# Patient Record
Sex: FEMALE | Race: WHITE | NOT HISPANIC OR LATINO | Employment: UNEMPLOYED | URBAN - NONMETROPOLITAN AREA
[De-identification: names, ages, dates, MRNs, and addresses within clinical notes are randomized per-mention and may not be internally consistent; named-entity substitution may affect disease eponyms.]

---
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
## 2022-01-05 PROCEDURE — U0004 COV-19 TEST NON-CDC HGH THRU: HCPCS | Performed by: NURSE PRACTITIONER

## 2022-01-09 ENCOUNTER — HOSPITAL ENCOUNTER (EMERGENCY)
Facility: HOSPITAL | Age: 22
Discharge: HOME | End: 2022-01-09
Attending: EMERGENCY MEDICINE | Admitting: EMERGENCY MEDICINE

## 2022-01-09 VITALS
OXYGEN SATURATION: 96 % | RESPIRATION RATE: 18 BRPM | WEIGHT: 190 LBS | HEIGHT: 63 IN | TEMPERATURE: 98.5 F | HEART RATE: 79 BPM | BODY MASS INDEX: 33.66 KG/M2 | SYSTOLIC BLOOD PRESSURE: 127 MMHG | DIASTOLIC BLOOD PRESSURE: 87 MMHG

## 2022-01-09 DIAGNOSIS — J00 ACUTE NASOPHARYNGITIS (COMMON COLD): Primary | ICD-10-CM

## 2022-01-09 LAB
B-HCG UR QL: NEGATIVE
SARS-COV-2 RNA PNL SPEC NAA+PROBE: NOT DETECTED

## 2022-01-09 PROCEDURE — 99283 EMERGENCY DEPT VISIT LOW MDM: CPT

## 2022-01-09 PROCEDURE — C9803 HOPD COVID-19 SPEC COLLECT: HCPCS

## 2022-01-09 PROCEDURE — 87635 SARS-COV-2 COVID-19 AMP PRB: CPT | Performed by: PHYSICIAN ASSISTANT

## 2022-01-09 PROCEDURE — 81025 URINE PREGNANCY TEST: CPT | Performed by: PHYSICIAN ASSISTANT

## 2022-01-09 NOTE — ED NOTES
Pt received discharge instructions and verbalized understanding; Breathing even and non labored with no signs of distress; AOx4; GCS 15; Pt ambulated off unit with steady gait with sister and friend.      Jeana Polo RN  01/09/22 3745

## 2022-01-09 NOTE — ED PROVIDER NOTES
"Subjective   History of Present Illness  Chief Complaint: Body aches, chills, nausea  History of Present Illness: 21-year-old female presents wanting to be tested for COVID-19, says she has had 2 days worth of body aches, chills, nausea.  Denies any vomiting, no abdominal pain, no cough, no shortness of breath, no chest pain.  She was tested on January 5 for COVID-19 that was negative, says she is had no symptoms since then and no exposure since then, she was discharged with steroid pack, and azithromycin which she is currently taking.  Onset: 2 days  Duration: Continuous  Exacerbating / Alleviating factors: None  Associated symptoms: None    Nurses Notes reviewed and agree, including vitals, allergies, social history and prior medical history.    REVIEW OF SYSTEMS: All systems reviewed and not pertinent unless noted.    Positive for: Body aches, chills, nausea    Negative for: All other review systems negative unless specified  Review of Systems    Past Medical History:   Diagnosis Date   • Anxiety    • Anxiety    • Depression    • Major depression    • PTSD (post-traumatic stress disorder)    • Self-injurious behavior     hx of cutting starting at age 15   • Suicide attempt ([facility])     3/25/19-overdose attempt, 2017 overdose attempt       Allergies   Allergen Reactions   • Iodine Hives   • Latex Hives       Past Surgical History:   Procedure Laterality Date   • ESOPHAGEAL DILATATION     • TONSILLECTOMY         Family History   Problem Relation Age of Onset   • Rheum arthritis Mother    • Depression Mother    • Drug abuse Mother    • Heart murmur Father    • Drug abuse Father    • Alcohol abuse Father        Social History     Socioeconomic History   • Marital status: Single   Tobacco Use   • Smoking status: Current Every Day Smoker     Types: Electronic Cigarette   • Smokeless tobacco: Never Used   Vaping Use   • Vaping Use: Every day   Substance and Sexual Activity   • Alcohol use: No     Comment: \"I barely ever " "drink alcohol.\"   • Drug use: Yes     Types: Marijuana   • Sexual activity: Yes     Partners: Male     Birth control/protection: Implant           Objective   Physical Exam  CONSTITUTIONAL: Well developed, well nourished female,  in no acute distress.  VITAL SIGNS: per nursing, reviewed and noted  SKIN: exposed skin with no rashes, ulcerations or petechiae.  EYES: perrla. EOMI.  ENT: Normal voice.  Patient maintained wearing a mask throughout patient encounter due to coronavirus pandemic  RESPIRATORY:  No increased work of breathing. No retractions.  CARDIOVASCULAR:  regular rate and rhythm, no murmurs.  Good Peripheral pulses. Good cap refill to extremities.  GI: Abdomen soft, nontender, normal bowel sounds. No hernia. No ascites.  NEUROLOGIC: Alert, oriented x 3. No gross deficits. GCS 15.  PSYCH: appropriate affect.    Procedures           ED Course  ED Course as of 01/09/22 1343   Sun Jan 09, 2022   1238 BP: 127/87 [CC]   1238 Temp: 98.5 °F (36.9 °C) [CC]   1238 Heart Rate: 79 [CC]   1238 Resp: 18 [CC]   1238 SpO2: 96 % [CC]   1313 HCG, Urine QL: Negative [CC]   1341 COVID19: Not Detected [CC]      ED Course User Index  [CC] Crescencio Osorio PA                                                 [facility]  Number of Diagnoses or Management Options  Diagnosis management comments: Patient arrived in stable condition with vital signs all within normal limits, requesting Covid test, Covid test was negative and pregnancy test also negative.  Inform likely viral upper respiratory tract infection, only symptomatic treatment symptoms may persist for 4 to 5 days then should start seeing improvement.  They were to follow-up with primary care for resolution of symptoms or return if any symptoms worsen or new symptoms arise.       Amount and/or Complexity of Data Reviewed  Clinical lab tests: reviewed    Risk of Complications, Morbidity, and/or Mortality  Presenting problems: low  Diagnostic procedures: minimal  Management options: " low        Final diagnoses:   Acute nasopharyngitis (common cold)       ED Disposition  ED Disposition     ED Disposition Condition Comment    Discharge Stable           Hafsa Woo, APRN  [address]  [facility] [address]  [phone]    Go to   As needed, If symptoms worsen    [facility] Emergency Department  [address] [facility] [address] [phone]  Go to   As needed, If symptoms worsen         Medication List      No changes were made to your prescriptions during this visit.          Crescencio Osorio, PA  01/09/22 1348

## 2022-02-10 ENCOUNTER — OFFICE VISIT (OUTPATIENT)
Dept: INTERNAL MEDICINE | Facility: CLINIC | Age: 22
End: 2022-02-10

## 2022-02-10 VITALS
SYSTOLIC BLOOD PRESSURE: 125 MMHG | TEMPERATURE: 97.2 F | OXYGEN SATURATION: 100 % | DIASTOLIC BLOOD PRESSURE: 79 MMHG | BODY MASS INDEX: 32.96 KG/M2 | WEIGHT: 186 LBS | RESPIRATION RATE: 16 BRPM | HEART RATE: 92 BPM | HEIGHT: 63 IN

## 2022-02-10 DIAGNOSIS — Z30.011 ORAL CONTRACEPTION INITIAL PRESCRIPTION: Primary | ICD-10-CM

## 2022-02-10 DIAGNOSIS — Z11.3 SCREEN FOR STD (SEXUALLY TRANSMITTED DISEASE): ICD-10-CM

## 2022-02-10 LAB
B-HCG UR QL: NEGATIVE
BILIRUB BLD-MCNC: NEGATIVE MG/DL
CLARITY, POC: ABNORMAL
COLOR UR: YELLOW
EXPIRATION DATE: ABNORMAL
EXPIRATION DATE: NORMAL
GLUCOSE UR STRIP-MCNC: NEGATIVE MG/DL
INTERNAL NEGATIVE CONTROL: NEGATIVE
INTERNAL POSITIVE CONTROL: POSITIVE
KETONES UR QL: ABNORMAL
LEUKOCYTE EST, POC: NEGATIVE
Lab: ABNORMAL
Lab: NORMAL
NITRITE UR-MCNC: NEGATIVE MG/ML
PH UR: 6 [PH] (ref 5–8)
PROT UR STRIP-MCNC: ABNORMAL MG/DL
RBC # UR STRIP: NEGATIVE /UL
SP GR UR: 1.03 (ref 1–1.03)
UROBILINOGEN UR QL: NORMAL

## 2022-02-10 PROCEDURE — 81025 URINE PREGNANCY TEST: CPT | Performed by: NURSE PRACTITIONER

## 2022-02-10 PROCEDURE — 99213 OFFICE O/P EST LOW 20 MIN: CPT | Performed by: NURSE PRACTITIONER

## 2022-02-10 NOTE — PROGRESS NOTES
"  Chief Complaint / Reason:      Chief Complaint   Patient presents with   • Exposure to STD     do I have BV? . stomachache       Subjective     HPI  Patient presents today for STD testing and states that she is concerned that she could have an STD or bacterial vaginosis as she does have a stomachache she denies fever chills.  Denies sex being uncomfortable or any bleeding with it also she would like to discuss birth control.  History taken from: patient    PMH/FH/Social History were reviewed and updated appropriately in the electronic medical record.   Past Medical History:   Diagnosis Date   • Anxiety    • Anxiety    • Depression    • Major depression    • PTSD (post-traumatic stress disorder)    • Self-injurious behavior     hx of cutting starting at age 15   • Suicide attempt ([facility])     3/25/19-overdose attempt, 2017 overdose attempt     Past Surgical History:   Procedure Laterality Date   • ESOPHAGEAL DILATATION     • TONSILLECTOMY       Social History     Socioeconomic History   • Marital status: Single   Tobacco Use   • Smoking status: Current Every Day Smoker     Types: Electronic Cigarette   • Smokeless tobacco: Never Used   Vaping Use   • Vaping Use: Every day   Substance and Sexual Activity   • Alcohol use: No     Comment: \"I barely ever drink alcohol.\"   • Drug use: Yes     Types: Marijuana   • Sexual activity: Yes     Partners: Male     Birth control/protection: Implant     Family History   Problem Relation Age of Onset   • Rheum arthritis Mother    • Depression Mother    • Drug abuse Mother    • Heart murmur Father    • Drug abuse Father    • Alcohol abuse Father        Review of Systems:   Review of Systems   Constitutional: Positive for fatigue.   HENT: Negative.    Respiratory: Negative.    Cardiovascular: Negative.    Gastrointestinal: Positive for abdominal pain and nausea.   Genitourinary: Positive for frequency.   Allergic/Immunologic: Positive for environmental allergies.   Neurological: " Negative.    Psychiatric/Behavioral: Negative.          All other systems were reviewed and are negative.  Exceptions are noted in the subjective or above.      Objective     Vital Signs  Vitals:    02/10/22 1138   BP: 125/79   Pulse: 92   Resp: 16   Temp: 97.2 °F (36.2 °C)   SpO2: 100%       Body mass index is 32.95 kg/m².    Physical Exam  Vitals and nursing note reviewed.   Constitutional:       General: She is not in acute distress.     Appearance: She is well-developed.   Cardiovascular:      Rate and Rhythm: Normal rate and regular rhythm.      Pulses: Normal pulses.      Heart sounds: Normal heart sounds.   Pulmonary:      Effort: Pulmonary effort is normal.      Breath sounds: Normal breath sounds. No wheezing.   Chest:      Chest wall: No tenderness.   Skin:     General: Skin is warm and dry.      Capillary Refill: Capillary refill takes less than 2 seconds.      Coloration: Skin is not pale.      Findings: No erythema or rash.   Neurological:      Mental Status: She is alert and oriented to person, place, and time.   Psychiatric:         Behavior: Behavior normal.         Thought Content: Thought content normal.         Judgment: Judgment normal.              Results Review:    I reviewed the patient's new clinical results.   Brief Urine Lab Results  (Last result in the past 365 days)      Color   Clarity   Blood   Leuk Est   Nitrite   Protein   CREAT   Urine HCG        02/10/22 1147               Negative               Medication Review:     Current Outpatient Medications:   •  norgestimate-ethinyl estradiol (Ortho Tri-Cyclen Lo) 0.18/0.215/0.25 MG-25 MCG per tablet, Take 1 tablet by mouth Daily., Disp: 28 tablet, Rfl: 12    Diagnoses and all orders for this visit:    Oral contraception initial prescription  -     POCT pregnancy, urine    Screen for STD (sexually transmitted disease)  -     NuSwab VG+ & HSV  -     POCT urinalysis dipstick, automated  -     Genital Mycoplasmas SON, Swab - Swab, Vagina  -      Nuab VG+ & HSV    Discussed STD and pregnancy prevention      Return if symptoms worsen or fail to improve, for Annual.    Hafsa Woo, APRN  02/10/2022

## 2022-02-15 LAB
A VAGINAE DNA VAG QL NAA+PROBE: ABNORMAL SCORE
BVAB2 DNA VAG QL NAA+PROBE: ABNORMAL SCORE
C ALBICANS DNA VAG QL NAA+PROBE: NEGATIVE
C GLABRATA DNA VAG QL NAA+PROBE: NEGATIVE
C TRACH DNA VAG QL NAA+PROBE: NEGATIVE
HSV1 DNA SPEC QL NAA+PROBE: NEGATIVE
HSV2 DNA SPEC QL NAA+PROBE: NEGATIVE
M GENITALIUM DNA SPEC QL NAA+PROBE: POSITIVE
M HOMINIS DNA SPEC QL NAA+PROBE: NEGATIVE
MEGA1 DNA VAG QL NAA+PROBE: ABNORMAL SCORE
N GONORRHOEA DNA VAG QL NAA+PROBE: NEGATIVE
T VAGINALIS DNA VAG QL NAA+PROBE: NEGATIVE
UREAPLASMA DNA SPEC QL NAA+PROBE: POSITIVE

## 2022-02-16 RX ORDER — CLINDAMYCIN PHOSPHATE 100 MG/5G
1 CREAM VAGINAL DAILY
Qty: 25 G | Refills: 0 | Status: SHIPPED | OUTPATIENT
Start: 2022-02-16 | End: 2022-02-21

## 2022-02-16 RX ORDER — DOXYCYCLINE 100 MG/1
100 TABLET ORAL 2 TIMES DAILY
Qty: 28 TABLET | Refills: 0 | Status: SHIPPED | OUTPATIENT
Start: 2022-02-16 | End: 2022-03-02

## 2022-02-17 ENCOUNTER — TELEPHONE (OUTPATIENT)
Dept: INTERNAL MEDICINE | Facility: CLINIC | Age: 22
End: 2022-02-17

## 2022-02-17 NOTE — TELEPHONE ENCOUNTER
Allyssa talked with pharmacy about this. I also noted Simitys mom (nickolas) had accessed the chart and seen messages and results.

## 2022-02-17 NOTE — PROGRESS NOTES
Please contact patient and let her know that I did send in 2 prescriptions for BV and that it will be tomorrow before they will get the medicine and but she needs to pick it up at the pharmacy.

## 2022-02-17 NOTE — TELEPHONE ENCOUNTER
I attempted to contact them to discuss medication options but they are close to [address] I will try again later if not please contact them for me so I can give them verbal orders over the phone so they can substitute it for a different gel or we can do Flagyl.

## 2022-02-17 NOTE — TELEPHONE ENCOUNTER
Pharmacy Name: [id number] - [place] - [address] AT [facility] [address] - [phone]  - [phone]      Pharmacy representative name: FELICITY    Pharmacy representative phone number: [phone]    What medication are you calling in regards to:Clindamycin Phosphate, 1 Dose, (Clindesse) 2 % vaginal cream, Apply 1 application topically to the appropriate area as directed Daily for 5 days    What question does the pharmacy have: THIS IS A LONG ACTING CREAM AND ITS ONLY GIVEN WITH ONE DOES     Who is the provider that prescribed the medication: GWEN RIGGINS

## 2022-03-02 ENCOUNTER — HOSPITAL ENCOUNTER (EMERGENCY)
Facility: HOSPITAL | Age: 22
Discharge: HOME | End: 2022-03-02
Attending: EMERGENCY MEDICINE | Admitting: FAMILY MEDICINE

## 2022-03-02 VITALS
HEIGHT: 63 IN | RESPIRATION RATE: 16 BRPM | WEIGHT: 188.2 LBS | DIASTOLIC BLOOD PRESSURE: 76 MMHG | BODY MASS INDEX: 33.35 KG/M2 | TEMPERATURE: 98.5 F | SYSTOLIC BLOOD PRESSURE: 113 MMHG | HEART RATE: 80 BPM | OXYGEN SATURATION: 98 %

## 2022-03-02 DIAGNOSIS — K52.9 GASTROENTERITIS: Primary | ICD-10-CM

## 2022-03-02 LAB
B-HCG UR QL: NEGATIVE
BACTERIA UR QL AUTO: ABNORMAL /HPF
BILIRUB UR QL STRIP: NEGATIVE
CLARITY UR: CLEAR
COLOR UR: ABNORMAL
FLUAV RNA RESP QL NAA+PROBE: NOT DETECTED
FLUBV RNA RESP QL NAA+PROBE: NOT DETECTED
GLUCOSE UR STRIP-MCNC: NEGATIVE MG/DL
HGB UR QL STRIP.AUTO: NEGATIVE
HYALINE CASTS UR QL AUTO: ABNORMAL /LPF
KETONES UR QL STRIP: ABNORMAL
LEUKOCYTE ESTERASE UR QL STRIP.AUTO: NEGATIVE
MUCOUS THREADS URNS QL MICRO: ABNORMAL /HPF
NITRITE UR QL STRIP: NEGATIVE
PH UR STRIP.AUTO: 6 [PH] (ref 5–8)
PROT UR QL STRIP: ABNORMAL
RBC # UR STRIP: ABNORMAL /HPF
REF LAB TEST METHOD: ABNORMAL
SARS-COV-2 RNA RESP QL NAA+PROBE: NOT DETECTED
SP GR UR STRIP: 1.03 (ref 1–1.03)
SQUAMOUS #/AREA URNS HPF: ABNORMAL /HPF
UROBILINOGEN UR QL STRIP: ABNORMAL
WBC # UR STRIP: ABNORMAL /HPF

## 2022-03-02 PROCEDURE — 99283 EMERGENCY DEPT VISIT LOW MDM: CPT

## 2022-03-02 PROCEDURE — 25010000002 ONDANSETRON PER 1 MG: Performed by: EMERGENCY MEDICINE

## 2022-03-02 PROCEDURE — 96374 THER/PROPH/DIAG INJ IV PUSH: CPT

## 2022-03-02 PROCEDURE — 87636 SARSCOV2 & INF A&B AMP PRB: CPT | Performed by: EMERGENCY MEDICINE

## 2022-03-02 PROCEDURE — 81001 URINALYSIS AUTO W/SCOPE: CPT | Performed by: EMERGENCY MEDICINE

## 2022-03-02 PROCEDURE — 81025 URINE PREGNANCY TEST: CPT | Performed by: EMERGENCY MEDICINE

## 2022-03-02 RX ORDER — ONDANSETRON 4 MG/1
4 TABLET, ORALLY DISINTEGRATING ORAL EVERY 6 HOURS PRN
Qty: 10 TABLET | Refills: 0 | OUTPATIENT
Start: 2022-03-02 | End: 2023-02-01

## 2022-03-02 RX ORDER — PROMETHAZINE HYDROCHLORIDE 12.5 MG/1
12.5 TABLET ORAL EVERY 6 HOURS PRN
Qty: 10 TABLET | Refills: 0 | OUTPATIENT
Start: 2022-03-02 | End: 2023-02-01

## 2022-03-02 RX ORDER — ONDANSETRON 2 MG/ML
4 INJECTION INTRAMUSCULAR; INTRAVENOUS ONCE
Status: COMPLETED | OUTPATIENT
Start: 2022-03-02 | End: 2022-03-02

## 2022-03-02 RX ORDER — SODIUM CHLORIDE 0.9 % (FLUSH) 0.9 %
10 SYRINGE (ML) INJECTION AS NEEDED
Status: DISCONTINUED | OUTPATIENT
Start: 2022-03-02 | End: 2022-03-02 | Stop reason: HOSPADM

## 2022-03-02 RX ADMIN — ONDANSETRON 4 MG: 2 INJECTION INTRAMUSCULAR; INTRAVENOUS at 19:25

## 2022-03-02 RX ADMIN — SODIUM CHLORIDE 1000 ML: 9 INJECTION, SOLUTION INTRAVENOUS at 19:25

## 2022-03-02 NOTE — ED PROVIDER NOTES
"Subjective   History of Present Illness    Chief Complaint: Nausea vomiting diarrhea body aches backache  History of Present Illness: 21-year-old female presents with above complaint x1 day.  Has a friend with similar symptoms.  Onset: None  Duration: Persistent  Exacerbating / Alleviating factors: Worse with p.o. intake  Associated symptoms: None      Nurses Notes reviewed and agree, including vitals, allergies, social history and prior medical history.     REVIEW OF SYSTEMS: All systems reviewed and not pertinent unless noted.    Positive for: Nausea vomiting diarrhea body aches backache    Negative for: Fever chills shortness of breath cough hemoptysis syncope chest pain palpitations  Review of Systems    Past Medical History:   Diagnosis Date   • Anxiety    • Anxiety    • Depression    • Major depression    • PTSD (post-traumatic stress disorder)    • Self-injurious behavior     hx of cutting starting at age 15   • Suicide attempt ([facility])     3/25/19-overdose attempt, 2017 overdose attempt       Allergies   Allergen Reactions   • Iodine Hives   • Latex Hives       Past Surgical History:   Procedure Laterality Date   • ESOPHAGEAL DILATATION     • TONSILLECTOMY         Family History   Problem Relation Age of Onset   • Rheum arthritis Mother    • Depression Mother    • Drug abuse Mother    • Heart murmur Father    • Drug abuse Father    • Alcohol abuse Father        Social History     Socioeconomic History   • Marital status: Single   Tobacco Use   • Smoking status: Current Every Day Smoker     Types: Electronic Cigarette   • Smokeless tobacco: Never Used   Vaping Use   • Vaping Use: Every day   Substance and Sexual Activity   • Alcohol use: No     Comment: \"I barely ever drink alcohol.\"   • Drug use: Yes     Types: Marijuana   • Sexual activity: Yes     Partners: Male     Birth control/protection: Implant           Objective   Physical Exam    CONSTITUTIONAL: Well developed, healthy-appearing nontoxic 21-year-old "  female,  in no acute distress.  VITAL SIGNS: per nursing, reviewed and noted  SKIN: exposed skin with no rashes, ulcerations or petechiae.  Well-healed scars to the forearms  EYES: perrla. EOMI.  ENT: Normal voice.  Patient maintained wearing a mask throughout patient encounter due to coronavirus pandemic  RESPIRATORY:  No increased work of breathing. No retractions.   CARDIOVASCULAR:  regular rate and rhythm, no murmurs.  Good Peripheral pulses. Good cap refill to extremities.   GI: Abdomen soft, nontender, normal bowel sounds. No hernia. No ascites.  MUSCULOSKELETAL:  No tenderness. Full ROM. Strength and tone grossly normal.  no spasms. no neck or back tenderness or spasm.   NEUROLOGIC: Alert, oriented x 3. No gross deficits. GCS 15.   PSYCH: appropriate affect.  : no bladder tenderness or distention, no CVA tenderness      Procedures     No attending physician procedures were performed on this patient.      ED Course      Patient had dark concentrated urine, therefore added IV fluids and antiemetics.  Added flu and COVID testing.  UA.  And patient will be signed out to oncoming physician for final disposition.                                           MDM  Number of Diagnoses or Management Options  Gastroenteritis: new and requires workup     Amount and/or Complexity of Data Reviewed  Clinical lab tests: ordered and reviewed  Tests in the radiology section of CPT®: reviewed and ordered  Tests in the medicine section of CPT®: reviewed and ordered  Independent visualization of images, tracings, or specimens: yes    Risk of Complications, Morbidity, and/or Mortality  Presenting problems: moderate  Diagnostic procedures: moderate  Management options: moderate        Final diagnoses:   Gastroenteritis       ED Disposition  ED Disposition     ED Disposition Condition Comment    Discharge Stable           Hafsa Woo, GWEN  [address] [address] [phone]    Schedule an  appointment as soon as possible for a visit            Medication List      New Prescriptions    ondansetron ODT 4 MG disintegrating tablet  Commonly known as: ZOFRAN-ODT  Place 1 tablet on the tongue Every 6 (Six) Hours As Needed for Nausea or Vomiting.     promethazine 12.5 MG tablet  Commonly known as: PHENERGAN  Take 1 tablet by mouth Every 6 (Six) Hours As Needed for Nausea or Vomiting.        ASK your doctor about these medications    doxycycline 100 MG tablet  Commonly known as: ADOXA  Take 1 tablet by mouth 2 (Two) Times a Day for 14 days.  Ask about: Should I take this medication?           Where to Get Your Medications      These medications were sent to [facility] #[id number] - [place], [place] - [address] AT [facility] [address] - [phone] PH - [phone] FX  [address] [place] [address] [place] [address]    Phone: [phone]   · ondansetron ODT 4 MG disintegrating tablet  · promethazine 12.5 MG tablet          Karen Cody DO  03/03/22 0311

## 2022-04-22 ENCOUNTER — TELEPHONE (OUTPATIENT)
Dept: INTERNAL MEDICINE | Facility: CLINIC | Age: 22
End: 2022-04-22

## 2022-04-22 DIAGNOSIS — Z30.011 ENCOUNTER FOR INITIAL PRESCRIPTION OF CONTRACEPTIVE PILLS: ICD-10-CM

## 2022-04-22 RX ORDER — NORGESTIMATE AND ETHINYL ESTRADIOL 7DAYSX3 LO
1 KIT ORAL DAILY
Qty: 28 TABLET | Refills: 12 | OUTPATIENT
Start: 2022-04-22 | End: 2023-02-01

## 2022-04-22 NOTE — TELEPHONE ENCOUNTER
Rx Refill Note  Requested Prescriptions     Pending Prescriptions Disp Refills   • norgestimate-ethinyl estradiol (Ortho Tri-Cyclen Lo) 0.18/0.215/0.25 MG-25 MCG per tablet 28 tablet 12     Sig: Take 1 tablet by mouth Daily.      Last office visit with prescribing clinician: 2/10/2022      Next office visit with prescribing clinician: Visit date not found            Liv Cano LPN  04/22/22, 13:38 EDT

## 2022-04-22 NOTE — TELEPHONE ENCOUNTER
Caller: VegaCarol    Relationship: Self    Best call back number: [phone]    Requested Prescriptions:   Requested Prescriptions     Pending Prescriptions Disp Refills   • norgestimate-ethinyl estradiol (Ortho Tri-Cyclen Lo) 0.18/0.215/0.25 MG-25 MCG per tablet 28 tablet 12     Sig: Take 1 tablet by mouth Daily.        Pharmacy where request should be sent: [facility] #[id number] [place] [address] AT [facility] [address] - [phone]  - [phone] FX     Additional details provided by patient: PATIENT IS COMPLETELY OUT OF MEDICATION    Does the patient have less than a 3 day supply:  [x] Yes  [] No    Abel Delgado Rep   04/22/22 13:28 EDT

## 2022-04-22 NOTE — TELEPHONE ENCOUNTER
Birth control called and cancelled rx at pharmacy, patient needs to come for pregnancy test at the office.

## 2022-07-04 ENCOUNTER — LAB REQUISITION (OUTPATIENT)
Dept: LAB | Facility: HOSPITAL | Age: 22
End: 2022-07-04

## 2022-07-04 DIAGNOSIS — Z32.00 ENCOUNTER FOR PREGNANCY TEST, RESULT UNKNOWN: ICD-10-CM

## 2022-07-04 LAB — HCG INTACT+B SERPL-ACNC: <0.1 MIU/ML

## 2022-07-04 PROCEDURE — 84702 CHORIONIC GONADOTROPIN TEST: CPT

## 2022-08-27 ENCOUNTER — LAB REQUISITION (OUTPATIENT)
Dept: LAB | Facility: HOSPITAL | Age: 22
End: 2022-08-27

## 2022-08-27 DIAGNOSIS — R50.9 FEVER, UNSPECIFIED: ICD-10-CM

## 2022-08-27 DIAGNOSIS — R51.9 HEADACHE, UNSPECIFIED: ICD-10-CM

## 2022-08-27 DIAGNOSIS — Z11.59 ENCOUNTER FOR SCREENING FOR OTHER VIRAL DISEASES: ICD-10-CM

## 2022-08-27 LAB
B PARAPERT DNA SPEC QL NAA+PROBE: NOT DETECTED
B PERT DNA SPEC QL NAA+PROBE: NOT DETECTED
C PNEUM DNA NPH QL NAA+NON-PROBE: NOT DETECTED
FLUAV SUBTYP SPEC NAA+PROBE: NOT DETECTED
FLUBV RNA ISLT QL NAA+PROBE: NOT DETECTED
HADV DNA SPEC NAA+PROBE: NOT DETECTED
HCOV 229E RNA SPEC QL NAA+PROBE: NOT DETECTED
HCOV HKU1 RNA SPEC QL NAA+PROBE: NOT DETECTED
HCOV NL63 RNA SPEC QL NAA+PROBE: NOT DETECTED
HCOV OC43 RNA SPEC QL NAA+PROBE: NOT DETECTED
HMPV RNA NPH QL NAA+NON-PROBE: NOT DETECTED
HPIV1 RNA ISLT QL NAA+PROBE: NOT DETECTED
HPIV2 RNA SPEC QL NAA+PROBE: NOT DETECTED
HPIV3 RNA NPH QL NAA+PROBE: NOT DETECTED
HPIV4 P GENE NPH QL NAA+PROBE: NOT DETECTED
M PNEUMO IGG SER IA-ACNC: NOT DETECTED
RHINOVIRUS RNA SPEC NAA+PROBE: NOT DETECTED
RSV RNA NPH QL NAA+NON-PROBE: NOT DETECTED
SARS-COV-2 RNA NPH QL NAA+NON-PROBE: DETECTED

## 2022-08-27 PROCEDURE — 0202U NFCT DS 22 TRGT SARS-COV-2: CPT

## 2022-09-21 ENCOUNTER — OFFICE VISIT (OUTPATIENT)
Dept: INTERNAL MEDICINE | Facility: CLINIC | Age: 22
End: 2022-09-21

## 2022-09-21 VITALS
BODY MASS INDEX: 36.5 KG/M2 | HEART RATE: 85 BPM | OXYGEN SATURATION: 100 % | RESPIRATION RATE: 16 BRPM | WEIGHT: 206 LBS | TEMPERATURE: 98 F | HEIGHT: 63 IN | SYSTOLIC BLOOD PRESSURE: 111 MMHG | DIASTOLIC BLOOD PRESSURE: 66 MMHG

## 2022-09-21 DIAGNOSIS — Z00.00 PHYSICAL EXAM, ANNUAL: Primary | ICD-10-CM

## 2022-09-21 DIAGNOSIS — Z13.21 SCREENING FOR ENDOCRINE, NUTRITIONAL, METABOLIC AND IMMUNITY DISORDER: ICD-10-CM

## 2022-09-21 DIAGNOSIS — F43.10 PTSD (POST-TRAUMATIC STRESS DISORDER): ICD-10-CM

## 2022-09-21 DIAGNOSIS — F91.3 OPPOSITIONAL DEFIANT DISORDER: ICD-10-CM

## 2022-09-21 DIAGNOSIS — E55.9 VITAMIN D INSUFFICIENCY: ICD-10-CM

## 2022-09-21 DIAGNOSIS — Z30.011 ENCOUNTER FOR INITIAL PRESCRIPTION OF CONTRACEPTIVE PILLS: ICD-10-CM

## 2022-09-21 DIAGNOSIS — Z13.228 SCREENING FOR ENDOCRINE, NUTRITIONAL, METABOLIC AND IMMUNITY DISORDER: ICD-10-CM

## 2022-09-21 DIAGNOSIS — Z11.3 SCREEN FOR STD (SEXUALLY TRANSMITTED DISEASE): ICD-10-CM

## 2022-09-21 DIAGNOSIS — Z13.0 SCREENING FOR ENDOCRINE, NUTRITIONAL, METABOLIC AND IMMUNITY DISORDER: ICD-10-CM

## 2022-09-21 DIAGNOSIS — E66.09 CLASS 2 OBESITY DUE TO EXCESS CALORIES WITHOUT SERIOUS COMORBIDITY WITH BODY MASS INDEX (BMI) OF 36.0 TO 36.9 IN ADULT: ICD-10-CM

## 2022-09-21 DIAGNOSIS — Z13.29 SCREENING FOR ENDOCRINE, NUTRITIONAL, METABOLIC AND IMMUNITY DISORDER: ICD-10-CM

## 2022-09-21 DIAGNOSIS — F60.3 BORDERLINE PERSONALITY DISORDER: ICD-10-CM

## 2022-09-21 LAB
B-HCG UR QL: NEGATIVE
BILIRUB BLD-MCNC: NEGATIVE MG/DL
CLARITY, POC: ABNORMAL
COLOR UR: YELLOW
EXPIRATION DATE: ABNORMAL
EXPIRATION DATE: NORMAL
GLUCOSE UR STRIP-MCNC: NEGATIVE MG/DL
INTERNAL NEGATIVE CONTROL: NEGATIVE
INTERNAL POSITIVE CONTROL: POSITIVE
KETONES UR QL: NEGATIVE
LEUKOCYTE EST, POC: NEGATIVE
Lab: ABNORMAL
Lab: NORMAL
NITRITE UR-MCNC: NEGATIVE MG/ML
PH UR: 6 [PH] (ref 5–8)
PROT UR STRIP-MCNC: NEGATIVE MG/DL
RBC # UR STRIP: NEGATIVE /UL
SP GR UR: 1.02 (ref 1–1.03)
UROBILINOGEN UR QL: NORMAL

## 2022-09-21 PROCEDURE — 3008F BODY MASS INDEX DOCD: CPT | Performed by: NURSE PRACTITIONER

## 2022-09-21 PROCEDURE — 81025 URINE PREGNANCY TEST: CPT | Performed by: NURSE PRACTITIONER

## 2022-09-21 PROCEDURE — 81003 URINALYSIS AUTO W/O SCOPE: CPT | Performed by: NURSE PRACTITIONER

## 2022-09-21 PROCEDURE — 99395 PREV VISIT EST AGE 18-39: CPT | Performed by: NURSE PRACTITIONER

## 2022-09-21 PROCEDURE — 2014F MENTAL STATUS ASSESS: CPT | Performed by: NURSE PRACTITIONER

## 2022-09-21 NOTE — PATIENT INSTRUCTIONS
Health Maintenance, Female  Adopting a healthy lifestyle and getting preventive care are important in promoting health and wellness. Ask your health care provider about:  The right schedule for you to have regular tests and exams.  Things you can do on your own to prevent diseases and keep yourself healthy.  What should I know about diet, weight, and exercise?  Eat a healthy diet    Eat a diet that includes plenty of vegetables, fruits, low-fat dairy products, and lean protein.  Do not eat a lot of foods that are high in solid fats, added sugars, or sodium.  Maintain a healthy weight  Body mass index (BMI) is used to identify weight problems. It estimates body fat based on height and weight. Your health care provider can help determine your BMI and help you achieve or maintain a healthy weight.  Get regular exercise  Get regular exercise. This is one of the most important things you can do for your health. Most adults should:  Exercise for at least 150 minutes each week. The exercise should increase your heart rate and make you sweat (moderate-intensity exercise).  Do strengthening exercises at least twice a week. This is in addition to the moderate-intensity exercise.  Spend less time sitting. Even light physical activity can be beneficial.  Watch cholesterol and blood lipids  Have your blood tested for lipids and cholesterol at 20 years of age, then have this test every 5 years.  Have your cholesterol levels checked more often if:  Your lipid or cholesterol levels are high.  You are older than 40 years of age.  You are at high risk for heart disease.  What should I know about cancer screening?  Depending on your health history and family history, you may need to have cancer screening at various ages. This may include screening for:  Breast cancer.  Cervical cancer.  Colorectal cancer.  Skin cancer.  Lung cancer.  What should I know about heart disease, diabetes, and high blood pressure?  Blood pressure and heart  disease  High blood pressure causes heart disease and increases the risk of stroke. This is more likely to develop in people who have high blood pressure readings or are overweight.  Have your blood pressure checked:  Every 3-5 years if you are 18-39 years of age.  Every year if you are 40 years old or older.  Diabetes  Have regular diabetes screenings. This checks your fasting blood sugar level. Have the screening done:  Once every three years after age 40 if you are at a normal weight and have a low risk for diabetes.  More often and at a younger age if you are overweight or have a high risk for diabetes.  What should I know about preventing infection?  Hepatitis B  If you have a higher risk for hepatitis B, you should be screened for this virus. Talk with your health care provider to find out if you are at risk for hepatitis B infection.  Hepatitis C  Testing is recommended for:  Everyone born from 1945 through 1965.  Anyone with known risk factors for hepatitis C.  Sexually transmitted infections (STIs)  Get screened for STIs, including gonorrhea and chlamydia, if:  You are sexually active and are younger than 24 years of age.  You are older than 24 years of age and your health care provider tells you that you are at risk for this type of infection.  Your sexual activity has changed since you were last screened, and you are at increased risk for chlamydia or gonorrhea. Ask your health care provider if you are at risk.  Ask your health care provider about whether you are at high risk for HIV. Your health care provider may recommend a prescription medicine to help prevent HIV infection. If you choose to take medicine to prevent HIV, you should first get tested for HIV. You should then be tested every 3 months for as long as you are taking the medicine.  Pregnancy  If you are about to stop having your period (premenopausal) and you may become pregnant, seek counseling before you get pregnant.  Take 400 to 800  micrograms (mcg) of folic acid every day if you become pregnant.  Ask for birth control (contraception) if you want to prevent pregnancy.  Osteoporosis and menopause  Osteoporosis is a disease in which the bones lose minerals and strength with aging. This can result in bone fractures. If you are 65 years old or older, or if you are at risk for osteoporosis and fractures, ask your health care provider if you should:  Be screened for bone loss.  Take a calcium or vitamin D supplement to lower your risk of fractures.  Be given hormone replacement therapy (HRT) to treat symptoms of menopause.  Follow these instructions at home:  Alcohol use  Do not drink alcohol if:  Your health care provider tells you not to drink.  You are pregnant, may be pregnant, or are planning to become pregnant.  If you drink alcohol:  Limit how much you have to:  0-1 drink a day.  Know how much alcohol is in your drink. In the [place] one drink equals one 12 oz bottle of beer (355 mL), one 5 oz glass of wine (148 mL), or one 1½ oz glass of hard liquor (44 mL).  Lifestyle  Do not use any products that contain nicotine or tobacco. These products include cigarettes, chewing tobacco, and vaping devices, such as e-cigarettes. If you need help quitting, ask your health care provider.  Do not use street drugs.  Do not share needles.  Ask your health care provider for help if you need support or information about quitting drugs.  General instructions  Schedule regular health, dental, and eye exams.  Stay current with your vaccines.  Tell your health care provider if:  You often feel depressed.  You have ever been abused or do not feel safe at home.  Summary  Adopting a healthy lifestyle and getting preventive care are important in promoting health and wellness.  Follow your health care provider's instructions about healthy diet, exercising, and getting tested or screened for diseases.  Follow your health care provider's instructions on monitoring your  cholesterol and blood pressure.  This information is not intended to replace advice given to you by your health care provider. Make sure you discuss any questions you have with your health care provider.  Document Revised: 05/09/2022 Document Reviewed: 05/09/2022  Elsevier Patient Education © 2022 [employer]
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
## 2022-09-27 DIAGNOSIS — F91.3 OPPOSITIONAL DEFIANT DISORDER: ICD-10-CM

## 2022-09-27 DIAGNOSIS — F60.3 BORDERLINE PERSONALITY DISORDER: ICD-10-CM

## 2022-09-27 LAB
A VAGINAE DNA VAG QL NAA+PROBE: NORMAL SCORE
BVAB2 DNA VAG QL NAA+PROBE: NORMAL SCORE
C ALBICANS DNA VAG QL NAA+PROBE: NEGATIVE
C GLABRATA DNA VAG QL NAA+PROBE: NEGATIVE
C TRACH DNA VAG QL NAA+PROBE: NEGATIVE
HSV1 DNA SPEC QL NAA+PROBE: NEGATIVE
HSV2 DNA SPEC QL NAA+PROBE: NEGATIVE
MEGA1 DNA VAG QL NAA+PROBE: NORMAL SCORE
N GONORRHOEA DNA VAG QL NAA+PROBE: NEGATIVE
T VAGINALIS DNA VAG QL NAA+PROBE: NEGATIVE

## 2022-10-19 ENCOUNTER — TRANSCRIBE ORDERS (OUTPATIENT)
Dept: LAB | Facility: HOSPITAL | Age: 22
End: 2022-10-19

## 2022-10-19 ENCOUNTER — HOSPITAL ENCOUNTER (OUTPATIENT)
Dept: GENERAL RADIOLOGY | Facility: HOSPITAL | Age: 22
Discharge: HOME | End: 2022-10-19

## 2022-10-19 DIAGNOSIS — Z02.1 PRE-EMPLOYMENT HEALTH SCREENING EXAMINATION: Primary | ICD-10-CM

## 2022-10-19 DIAGNOSIS — Z02.1 PRE-EMPLOYMENT HEALTH SCREENING EXAMINATION: ICD-10-CM

## 2022-10-19 PROCEDURE — 71046 X-RAY EXAM CHEST 2 VIEWS: CPT | Performed by: RADIOLOGY

## 2022-10-19 PROCEDURE — 71046 X-RAY EXAM CHEST 2 VIEWS: CPT

## 2022-11-09 PROCEDURE — U0004 COV-19 TEST NON-CDC HGH THRU: HCPCS | Performed by: NURSE PRACTITIONER

## 2023-01-19 ENCOUNTER — HOSPITAL ENCOUNTER (EMERGENCY)
Facility: HOSPITAL | Age: 23
Discharge: HOME | End: 2023-01-19
Attending: STUDENT IN AN ORGANIZED HEALTH CARE EDUCATION/TRAINING PROGRAM | Admitting: STUDENT IN AN ORGANIZED HEALTH CARE EDUCATION/TRAINING PROGRAM
Payer: COMMERCIAL

## 2023-01-19 ENCOUNTER — APPOINTMENT (OUTPATIENT)
Dept: CT IMAGING | Facility: HOSPITAL | Age: 23
End: 2023-01-19
Payer: COMMERCIAL

## 2023-01-19 VITALS
BODY MASS INDEX: 38.09 KG/M2 | RESPIRATION RATE: 18 BRPM | WEIGHT: 215 LBS | SYSTOLIC BLOOD PRESSURE: 116 MMHG | DIASTOLIC BLOOD PRESSURE: 74 MMHG | HEIGHT: 63 IN | TEMPERATURE: 97.7 F | HEART RATE: 97 BPM | OXYGEN SATURATION: 96 %

## 2023-01-19 DIAGNOSIS — S00.83XA CONTUSION OF FACE, INITIAL ENCOUNTER: ICD-10-CM

## 2023-01-19 DIAGNOSIS — S09.93XA FACIAL INJURY, INITIAL ENCOUNTER: Primary | ICD-10-CM

## 2023-01-19 DIAGNOSIS — S02.2XXA CLOSED FRACTURE OF NASAL BONE, INITIAL ENCOUNTER: ICD-10-CM

## 2023-01-19 LAB — B-HCG UR QL: NEGATIVE

## 2023-01-19 PROCEDURE — 70486 CT MAXILLOFACIAL W/O DYE: CPT

## 2023-01-19 PROCEDURE — 99283 EMERGENCY DEPT VISIT LOW MDM: CPT

## 2023-01-19 PROCEDURE — 81025 URINE PREGNANCY TEST: CPT | Performed by: PHYSICIAN ASSISTANT

## 2023-01-19 PROCEDURE — 70450 CT HEAD/BRAIN W/O DYE: CPT
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
## 2023-01-19 NOTE — Clinical Note
[facility] EMERGENCY DEPARTMENT  [address] [address]  Phone: [phone]    Carol Vega was seen and treated in our emergency department on 1/19/2023.  She may return to work on 01/21/2023.         Thank you for choosing [facility].    Preston Jensen MD

## 2023-01-19 NOTE — Clinical Note
[facility] EMERGENCY DEPARTMENT  [address] [address]  Phone: [phone]    Carol Vega was seen and treated in our emergency department on 1/19/2023.  She may return to work on 01/21/2023.         Thank you for choosing [facility].    Leigh Ann Schulz PA-C

## 2023-01-20 NOTE — DISCHARGE INSTRUCTIONS
Appears you have nondisplaced nasal bone fractures which is likely contributing to the swelling and bruising of your face.  I do not see any other facial bone fractures or bleeding inside the brain.  You can use ice to help with swelling.  Alternate ibuprofen and Tylenol to help with pain.  You will need to follow sinus precautions which would include sneezing with your mouth open, no straws, no forceful blowing of the nose.  You can.  You may follow-up with ear nose and throat as needed especially if you have difficulty breathing through your nose.  Return to ER for any change in worsening of symptoms, or any additional concerns.

## 2023-01-24 DIAGNOSIS — S02.2XXG CLOSED FRACTURE OF NASAL BONE WITH DELAYED HEALING, SUBSEQUENT ENCOUNTER: Primary | ICD-10-CM
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
## 2023-02-08 ENCOUNTER — INITIAL PRENATAL (OUTPATIENT)
Dept: OBSTETRICS AND GYNECOLOGY | Facility: CLINIC | Age: 23
End: 2023-02-08
Payer: COMMERCIAL

## 2023-02-08 VITALS — SYSTOLIC BLOOD PRESSURE: 112 MMHG | BODY MASS INDEX: 37.85 KG/M2 | DIASTOLIC BLOOD PRESSURE: 62 MMHG | WEIGHT: 212 LBS

## 2023-02-08 DIAGNOSIS — O21.9 NAUSEA/VOMITING IN PREGNANCY: ICD-10-CM

## 2023-02-08 DIAGNOSIS — Z77.011 EXPOSURE TO LEAD: ICD-10-CM

## 2023-02-08 DIAGNOSIS — O99.341 DEPRESSION AFFECTING PREGNANCY IN FIRST TRIMESTER, ANTEPARTUM: ICD-10-CM

## 2023-02-08 DIAGNOSIS — F32.A DEPRESSION AFFECTING PREGNANCY IN FIRST TRIMESTER, ANTEPARTUM: ICD-10-CM

## 2023-02-08 DIAGNOSIS — O09.91 SUPERVISION OF HIGH RISK PREGNANCY IN FIRST TRIMESTER: Primary | ICD-10-CM

## 2023-02-08 PROCEDURE — 99204 OFFICE O/P NEW MOD 45 MIN: CPT | Performed by: STUDENT IN AN ORGANIZED HEALTH CARE EDUCATION/TRAINING PROGRAM

## 2023-02-08 RX ORDER — DIPHENHYDRAMINE HYDROCHLORIDE 25 MG/1
25 CAPSULE ORAL DAILY
Qty: 30 TABLET | Refills: 5 | Status: SHIPPED | OUTPATIENT
Start: 2023-02-08

## 2023-02-08 RX ORDER — PRENATAL VIT NO.126/IRON/FOLIC 28MG-0.8MG
TABLET ORAL DAILY
COMMUNITY

## 2023-02-08 RX ORDER — FLUOXETINE 10 MG/1
10 CAPSULE ORAL DAILY
Qty: 30 CAPSULE | Refills: 6 | Status: SHIPPED | OUTPATIENT
Start: 2023-02-08 | End: 2023-03-06

## 2023-02-08 RX ORDER — CALCIUM GLUCONATE 45(500) MG
500 TABLET ORAL
Qty: 90 TABLET | Refills: 11 | Status: SHIPPED | OUTPATIENT
Start: 2023-02-08 | End: 2024-02-08

## 2023-02-10 LAB
AMPHETAMINES UR QL SCN: NEGATIVE NG/ML
BARBITURATES UR QL SCN: NEGATIVE NG/ML
BENZODIAZ UR QL SCN: NEGATIVE NG/ML
BZE UR QL SCN: NEGATIVE NG/ML
C TRACH RRNA SPEC QL NAA+PROBE: NEGATIVE
CANNABINOIDS UR QL SCN: NEGATIVE NG/ML
CREAT UR-MCNC: 124.6 MG/DL (ref 20–300)
LABORATORY COMMENT REPORT: NORMAL
METHADONE UR QL SCN: NEGATIVE NG/ML
N GONORRHOEA RRNA SPEC QL NAA+PROBE: NEGATIVE
OPIATES UR QL SCN: NEGATIVE NG/ML
OXYCODONE+OXYMORPHONE UR QL SCN: NEGATIVE NG/ML
PCP UR QL: NEGATIVE NG/ML
PH UR: 6 [PH] (ref 4.5–8.9)
PROPOXYPH UR QL SCN: NEGATIVE NG/ML
T VAGINALIS RRNA SPEC QL NAA+PROBE: NEGATIVE

## 2023-02-16 ENCOUNTER — HOSPITAL ENCOUNTER (EMERGENCY)
Facility: HOSPITAL | Age: 23
Discharge: HOME | End: 2023-02-17
Attending: EMERGENCY MEDICINE | Admitting: EMERGENCY MEDICINE
Payer: COMMERCIAL

## 2023-02-16 DIAGNOSIS — K80.20 CALCULUS OF GALLBLADDER WITHOUT CHOLECYSTITIS WITHOUT OBSTRUCTION: Primary | ICD-10-CM

## 2023-02-16 PROCEDURE — 83690 ASSAY OF LIPASE: CPT

## 2023-02-16 PROCEDURE — 85025 COMPLETE CBC W/AUTO DIFF WBC: CPT

## 2023-02-16 PROCEDURE — 80053 COMPREHEN METABOLIC PANEL: CPT

## 2023-02-16 PROCEDURE — 99283 EMERGENCY DEPT VISIT LOW MDM: CPT

## 2023-02-16 PROCEDURE — 81003 URINALYSIS AUTO W/O SCOPE: CPT

## 2023-02-16 RX ORDER — SODIUM CHLORIDE 0.9 % (FLUSH) 0.9 %
10 SYRINGE (ML) INJECTION AS NEEDED
Status: DISCONTINUED | OUTPATIENT
Start: 2023-02-16 | End: 2023-02-17 | Stop reason: HOSPADM

## 2023-02-17 ENCOUNTER — APPOINTMENT (OUTPATIENT)
Dept: ULTRASOUND IMAGING | Facility: HOSPITAL | Age: 23
End: 2023-02-17
Payer: COMMERCIAL

## 2023-02-17 VITALS
HEIGHT: 63 IN | OXYGEN SATURATION: 97 % | SYSTOLIC BLOOD PRESSURE: 124 MMHG | WEIGHT: 215 LBS | RESPIRATION RATE: 17 BRPM | DIASTOLIC BLOOD PRESSURE: 89 MMHG | BODY MASS INDEX: 38.09 KG/M2 | HEART RATE: 70 BPM | TEMPERATURE: 98.2 F

## 2023-02-17 LAB
ALBUMIN SERPL-MCNC: 3.9 G/DL (ref 3.5–5.2)
ALBUMIN/GLOB SERPL: 1.6 G/DL
ALP SERPL-CCNC: 80 U/L (ref 39–117)
ALT SERPL W P-5'-P-CCNC: 10 U/L (ref 1–33)
ANION GAP SERPL CALCULATED.3IONS-SCNC: 10.4 MMOL/L (ref 5–15)
AST SERPL-CCNC: 14 U/L (ref 1–32)
BASOPHILS # BLD AUTO: 0.05 10*3/MM3 (ref 0–0.2)
BASOPHILS NFR BLD AUTO: 0.3 % (ref 0–1.5)
BILIRUB SERPL-MCNC: 0.2 MG/DL (ref 0–1.2)
BILIRUB UR QL STRIP: NEGATIVE
BUN SERPL-MCNC: 12 MG/DL (ref 6–20)
BUN/CREAT SERPL: 20 (ref 7–25)
CALCIUM SPEC-SCNC: 9.2 MG/DL (ref 8.6–10.5)
CHLORIDE SERPL-SCNC: 104 MMOL/L (ref 98–107)
CLARITY UR: CLEAR
CO2 SERPL-SCNC: 19.6 MMOL/L (ref 22–29)
COLOR UR: YELLOW
CREAT SERPL-MCNC: 0.6 MG/DL (ref 0.57–1)
DEPRECATED RDW RBC AUTO: 38 FL (ref 37–54)
EGFRCR SERPLBLD CKD-EPI 2021: 132 ML/MIN/1.73
EOSINOPHIL # BLD AUTO: 0.46 10*3/MM3 (ref 0–0.4)
EOSINOPHIL NFR BLD AUTO: 2.6 % (ref 0.3–6.2)
ERYTHROCYTE [DISTWIDTH] IN BLOOD BY AUTOMATED COUNT: 12.6 % (ref 12.3–15.4)
GLOBULIN UR ELPH-MCNC: 2.4 GM/DL
GLUCOSE SERPL-MCNC: 111 MG/DL (ref 65–99)
GLUCOSE UR STRIP-MCNC: NEGATIVE MG/DL
HCT VFR BLD AUTO: 35.1 % (ref 34–46.6)
HGB BLD-MCNC: 12.5 G/DL (ref 12–15.9)
HGB UR QL STRIP.AUTO: NEGATIVE
HOLD SPECIMEN: NORMAL
HOLD SPECIMEN: NORMAL
IMM GRANULOCYTES # BLD AUTO: 0.06 10*3/MM3 (ref 0–0.05)
IMM GRANULOCYTES NFR BLD AUTO: 0.3 % (ref 0–0.5)
KETONES UR QL STRIP: ABNORMAL
LEUKOCYTE ESTERASE UR QL STRIP.AUTO: NEGATIVE
LIPASE SERPL-CCNC: 36 U/L (ref 13–60)
LYMPHOCYTES # BLD AUTO: 3.89 10*3/MM3 (ref 0.7–3.1)
LYMPHOCYTES NFR BLD AUTO: 21.9 % (ref 19.6–45.3)
MCH RBC QN AUTO: 29.3 PG (ref 26.6–33)
MCHC RBC AUTO-ENTMCNC: 35.6 G/DL (ref 31.5–35.7)
MCV RBC AUTO: 82.4 FL (ref 79–97)
MONOCYTES # BLD AUTO: 1.15 10*3/MM3 (ref 0.1–0.9)
MONOCYTES NFR BLD AUTO: 6.5 % (ref 5–12)
NEUTROPHILS NFR BLD AUTO: 12.19 10*3/MM3 (ref 1.7–7)
NEUTROPHILS NFR BLD AUTO: 68.4 % (ref 42.7–76)
NITRITE UR QL STRIP: NEGATIVE
NRBC BLD AUTO-RTO: 0 /100 WBC (ref 0–0.2)
PH UR STRIP.AUTO: 5.5 [PH] (ref 5–8)
PLATELET # BLD AUTO: 311 10*3/MM3 (ref 140–450)
PMV BLD AUTO: 10.2 FL (ref 6–12)
POTASSIUM SERPL-SCNC: 4.3 MMOL/L (ref 3.5–5.2)
PROT SERPL-MCNC: 6.3 G/DL (ref 6–8.5)
PROT UR QL STRIP: NEGATIVE
RBC # BLD AUTO: 4.26 10*6/MM3 (ref 3.77–5.28)
SODIUM SERPL-SCNC: 134 MMOL/L (ref 136–145)
SP GR UR STRIP: >=1.03 (ref 1–1.03)
UROBILINOGEN UR QL STRIP: ABNORMAL
WBC NRBC COR # BLD: 17.8 10*3/MM3 (ref 3.4–10.8)
WHOLE BLOOD HOLD COAG: NORMAL
WHOLE BLOOD HOLD SPECIMEN: NORMAL

## 2023-02-17 PROCEDURE — 96361 HYDRATE IV INFUSION ADD-ON: CPT

## 2023-02-17 PROCEDURE — 25010000002 ONDANSETRON PER 1 MG: Performed by: EMERGENCY MEDICINE

## 2023-02-17 PROCEDURE — 96374 THER/PROPH/DIAG INJ IV PUSH: CPT

## 2023-02-17 PROCEDURE — 76705 ECHO EXAM OF ABDOMEN: CPT

## 2023-02-17 RX ORDER — ONDANSETRON 2 MG/ML
4 INJECTION INTRAMUSCULAR; INTRAVENOUS ONCE
Status: COMPLETED | OUTPATIENT
Start: 2023-02-17 | End: 2023-02-17

## 2023-02-17 RX ADMIN — SODIUM CHLORIDE 1000 ML: 9 INJECTION, SOLUTION INTRAVENOUS at 00:22

## 2023-02-17 RX ADMIN — ONDANSETRON 4 MG: 2 INJECTION INTRAMUSCULAR; INTRAVENOUS at 00:22

## 2023-02-17 NOTE — ED PROVIDER NOTES
"Subjective   History of Present Illness  22-year-old female presents to ED with chief complaint of abdominal pain.  Right upper quadrant abdominal pain started approximately 6 hours ago.  Dull aching pain in her right upper quadrant worse after eating.  Associated with nausea.  No vomiting.  Patient is approximately 8 weeks pregnant.  Pain radiates to her back.  No fever or chills.  No vaginal bleeding or discharge.  No lower abdominal pain.  No pelvic pain or cramping.  No other complaints at this time.        Review of Systems   Gastrointestinal: Positive for abdominal pain and nausea. Negative for diarrhea and vomiting.   Genitourinary: Negative for dysuria.   All other systems reviewed and are negative.      Past Medical History:   Diagnosis Date   • Anxiety    • Anxiety    • Chlamydia    • Depression    • Endometriosis    • Gonorrhea    • Kidney stone    • Major depression    • Migraine    • PMS (premenstrual syndrome)    • PTSD (post-traumatic stress disorder)    • Seizures ([facility])    • Self-injurious behavior     hx of cutting starting at age 15   • Suicide attempt ([facility])     3/25/19-overdose attempt, 2017 overdose attempt   • Urinary tract infection        Allergies   Allergen Reactions   • Iodine Hives   • Latex Hives       Past Surgical History:   Procedure Laterality Date   • ESOPHAGEAL DILATATION     • TONSILLECTOMY         Family History   Problem Relation Age of Onset   • Rheum arthritis Mother    • Depression Mother    • Drug abuse Mother    • Heart murmur Father    • Drug abuse Father    • Alcohol abuse Father        Social History     Socioeconomic History   • Marital status: Single   Tobacco Use   • Smoking status: Former     Types: Electronic Cigarette   • Smokeless tobacco: Never   • Tobacco comments:     vapes   Vaping Use   • Vaping Use: Every day   Substance and Sexual Activity   • Alcohol use: Not Currently     Comment: \"I barely ever drink alcohol.\"   • Drug use: Not Currently     Types: " Marijuana     Comment: once monthly   • Sexual activity: Yes     Partners: Male     Birth control/protection: None           Objective   Physical Exam  Vitals and nursing note reviewed.   Constitutional:       Appearance: She is well-developed.   HENT:      Head: Normocephalic and atraumatic.   Cardiovascular:      Rate and Rhythm: Normal rate and regular rhythm.   Pulmonary:      Effort: Pulmonary effort is normal.      Breath sounds: Normal breath sounds.   Abdominal:      General: Abdomen is flat. There is no distension. There are no signs of injury.      Tenderness: There is abdominal tenderness in the right upper quadrant.   Neurological:      Mental Status: She is alert.         Procedures           ED Course                                           MDM  Chief complaint: Abdominal pain    Initial impression of presenting illness: Well-appearing nontoxic 22-year-old female with right upper quadrant abdominal pain    Comorbidities requiring consideration and/or management: Current pregnancy, GERD,    Differential diagnosis includes but not limited to: Cholelithiasis, biliary colic, acute cholecystitis, biliary dyskinesia, pancreatitis, other    Patient arrives hemodynamically stable, afebrile, without respiratory distress with initial vitals interpreted by myself.  Pertinent initial vitals include temp 98.2, heart rate 67, /90, respiratory rate of 16, pulse ox of 100% on room air    Pertinent features from physical exam: Right upper quadrant tenderness to palpation, no guarding, no rigidity, no peritoneal signs    Initial diagnostic plan: CBC, CMP, lipase, urinalysis, fetal heart tones, consider ultrasound if labs are abnormal    Results from initial plan were reviewed and interpreted by myself and include: CBC is reassuring but does show elevated white blood cell count of 17.8, hemoglobin of 12.5, CMP is reassuring minimally decreased CO2, lipase normal at 36, normal LFTs ultrasound performed and  interpreted by radiologist to show cholelithiasis without signs of CBD dilation or acute cholecystitis       Diagnostic information from other sources includes: Review of previous visits, prior labs, prior imaging, available notes from prior evaluations or visits with specialists, medication list, allergies, past medical history, past surgical history    Interventions in the ED included: IV Zofran, IV fluid rehydration    Reevaluation:Resting comfortably, pain and nausea have resolved     Results/clinical rationale were discussed with patient and significant other at bedside    Consultations and discussions of results with other physicians:NA - Outpatient General surgery followup     Discussion of results/management/plan: Patient presented to the ED with right upper quadrant abdominal pain in early pregnancy.  Labs are relatively reassuring with minimally elevated white blood cell count.  Normal LFTs.  Ultrasound consistent with cholelithiasis without secondary signs of acute cholecystitis.  Her pain improved here in the ED.  No episodes of vomiting.  She is resting comfortably, tolerating p.o. intake.  She is appropriate for discharge with follow-up with OB/GYN and general surgery    Disposition plan: Discharge home with continued prenatal care, OB/GYN follow-up and referral to general surgery      Final diagnoses:   Calculus of gallbladder without cholecystitis without obstruction       ED Disposition  ED Disposition     ED Disposition   Discharge    Condition   Stable    Comment   --             Hafsa Woo, APRN  [address] [facility]  [address]  [place] [address]  [phone]          Del Rogers MD  [address] [facility]  [address]  [place] [address]  [phone]    Schedule an appointment as soon as possible for a visit            Medication List      No changes were made to your prescriptions during this visit.          Gage Rodgers, DO  02/17/23 0218

## 2023-02-26 LAB
25(OH)D3 SERPL-MCNC: 22.7 NG/ML (ref 30–100)
ABO GROUP BLD: NORMAL
ALBUMIN SERPL-MCNC: 4.2 G/DL (ref 3.5–5.2)
ALBUMIN/GLOB SERPL: 1.5 G/DL
ALP SERPL-CCNC: 76 U/L (ref 39–117)
ALT SERPL W P-5'-P-CCNC: 10 U/L (ref 1–33)
ANION GAP SERPL CALCULATED.3IONS-SCNC: 10.6 MMOL/L (ref 5–15)
AST SERPL-CCNC: 16 U/L (ref 1–32)
BASOPHILS # BLD AUTO: 0.05 10*3/MM3 (ref 0–0.2)
BASOPHILS NFR BLD AUTO: 0.4 % (ref 0–1.5)
BILIRUB SERPL-MCNC: 0.5 MG/DL (ref 0–1.2)
BLD GP AB SCN SERPL QL: NEGATIVE
BUN SERPL-MCNC: 8 MG/DL (ref 6–20)
BUN/CREAT SERPL: 13.3 (ref 7–25)
CALCIUM SPEC-SCNC: 9.4 MG/DL (ref 8.6–10.5)
CHLORIDE SERPL-SCNC: 102 MMOL/L (ref 98–107)
CO2 SERPL-SCNC: 24.4 MMOL/L (ref 22–29)
CREAT SERPL-MCNC: 0.6 MG/DL (ref 0.57–1)
DEPRECATED RDW RBC AUTO: 38.4 FL (ref 37–54)
EGFRCR SERPLBLD CKD-EPI 2021: 132 ML/MIN/1.73
EOSINOPHIL # BLD AUTO: 0.47 10*3/MM3 (ref 0–0.4)
EOSINOPHIL NFR BLD AUTO: 3.6 % (ref 0.3–6.2)
ERYTHROCYTE [DISTWIDTH] IN BLOOD BY AUTOMATED COUNT: 12.7 % (ref 12.3–15.4)
GLOBULIN UR ELPH-MCNC: 2.8 GM/DL
GLUCOSE SERPL-MCNC: 88 MG/DL (ref 65–99)
HAV IGM SERPL QL IA: NORMAL
HBA1C MFR BLD: 4.9 % (ref 4.8–5.6)
HBV CORE IGM SERPL QL IA: NORMAL
HBV SURFACE AG SERPL QL IA: NORMAL
HBV SURFACE AG SERPL QL IA: NORMAL
HCT VFR BLD AUTO: 36.1 % (ref 34–46.6)
HCV AB SER DONR QL: NORMAL
HCV AB SER DONR QL: NORMAL
HGB BLD-MCNC: 12.8 G/DL (ref 12–15.9)
HIV1 P24 AG SER QL: NORMAL
HIV1+2 AB SER QL: NORMAL
IMM GRANULOCYTES # BLD AUTO: 0.07 10*3/MM3 (ref 0–0.05)
IMM GRANULOCYTES NFR BLD AUTO: 0.5 % (ref 0–0.5)
LYMPHOCYTES # BLD AUTO: 2.65 10*3/MM3 (ref 0.7–3.1)
LYMPHOCYTES NFR BLD AUTO: 20.6 % (ref 19.6–45.3)
MCH RBC QN AUTO: 29.5 PG (ref 26.6–33)
MCHC RBC AUTO-ENTMCNC: 35.5 G/DL (ref 31.5–35.7)
MCV RBC AUTO: 83.2 FL (ref 79–97)
MONOCYTES # BLD AUTO: 0.92 10*3/MM3 (ref 0.1–0.9)
MONOCYTES NFR BLD AUTO: 7.1 % (ref 5–12)
NEUTROPHILS NFR BLD AUTO: 67.8 % (ref 42.7–76)
NEUTROPHILS NFR BLD AUTO: 8.72 10*3/MM3 (ref 1.7–7)
NRBC BLD AUTO-RTO: 0 /100 WBC (ref 0–0.2)
PLATELET # BLD AUTO: 253 10*3/MM3 (ref 140–450)
PMV BLD AUTO: 10.2 FL (ref 6–12)
POTASSIUM SERPL-SCNC: 3.5 MMOL/L (ref 3.5–5.2)
PROT SERPL-MCNC: 7 G/DL (ref 6–8.5)
RBC # BLD AUTO: 4.34 10*6/MM3 (ref 3.77–5.28)
RH BLD: POSITIVE
SODIUM SERPL-SCNC: 137 MMOL/L (ref 136–145)
T4 FREE SERPL-MCNC: 1.08 NG/DL (ref 0.93–1.7)
TSH SERPL DL<=0.05 MIU/L-ACNC: 3.33 UIU/ML (ref 0.27–4.2)
VIT B12 BLD-MCNC: 319 PG/ML (ref 211–946)
WBC NRBC COR # BLD: 12.88 10*3/MM3 (ref 3.4–10.8)

## 2023-02-26 PROCEDURE — 81220 CFTR GENE COM VARIANTS: CPT | Performed by: STUDENT IN AN ORGANIZED HEALTH CARE EDUCATION/TRAINING PROGRAM

## 2023-02-26 PROCEDURE — 80050 GENERAL HEALTH PANEL: CPT | Performed by: STUDENT IN AN ORGANIZED HEALTH CARE EDUCATION/TRAINING PROGRAM

## 2023-02-26 PROCEDURE — 82306 VITAMIN D 25 HYDROXY: CPT | Performed by: NURSE PRACTITIONER

## 2023-02-26 PROCEDURE — 81329 SMN1 GENE DOS/DELETION ALYS: CPT | Performed by: STUDENT IN AN ORGANIZED HEALTH CARE EDUCATION/TRAINING PROGRAM

## 2023-02-26 PROCEDURE — 86592 SYPHILIS TEST NON-TREP QUAL: CPT | Performed by: STUDENT IN AN ORGANIZED HEALTH CARE EDUCATION/TRAINING PROGRAM

## 2023-02-26 PROCEDURE — 86762 RUBELLA ANTIBODY: CPT | Performed by: NURSE PRACTITIONER

## 2023-02-26 PROCEDURE — 36415 COLL VENOUS BLD VENIPUNCTURE: CPT | Performed by: NURSE PRACTITIONER

## 2023-02-26 PROCEDURE — 85660 RBC SICKLE CELL TEST: CPT | Performed by: STUDENT IN AN ORGANIZED HEALTH CARE EDUCATION/TRAINING PROGRAM

## 2023-02-26 PROCEDURE — 86695 HERPES SIMPLEX TYPE 1 TEST: CPT | Performed by: NURSE PRACTITIONER

## 2023-02-26 PROCEDURE — 83655 ASSAY OF LEAD: CPT | Performed by: STUDENT IN AN ORGANIZED HEALTH CARE EDUCATION/TRAINING PROGRAM

## 2023-02-26 PROCEDURE — 86900 BLOOD TYPING SEROLOGIC ABO: CPT | Performed by: STUDENT IN AN ORGANIZED HEALTH CARE EDUCATION/TRAINING PROGRAM

## 2023-02-26 PROCEDURE — 86850 RBC ANTIBODY SCREEN: CPT | Performed by: STUDENT IN AN ORGANIZED HEALTH CARE EDUCATION/TRAINING PROGRAM

## 2023-02-26 PROCEDURE — 86696 HERPES SIMPLEX TYPE 2 TEST: CPT | Performed by: NURSE PRACTITIONER

## 2023-02-26 PROCEDURE — 84202 ASSAY RBC PROTOPORPHYRIN: CPT | Performed by: STUDENT IN AN ORGANIZED HEALTH CARE EDUCATION/TRAINING PROGRAM

## 2023-02-26 PROCEDURE — G0475 HIV COMBINATION ASSAY: HCPCS | Performed by: STUDENT IN AN ORGANIZED HEALTH CARE EDUCATION/TRAINING PROGRAM

## 2023-02-26 PROCEDURE — 86901 BLOOD TYPING SEROLOGIC RH(D): CPT | Performed by: STUDENT IN AN ORGANIZED HEALTH CARE EDUCATION/TRAINING PROGRAM

## 2023-02-26 PROCEDURE — 84439 ASSAY OF FREE THYROXINE: CPT | Performed by: NURSE PRACTITIONER

## 2023-02-26 PROCEDURE — 87522 HEPATITIS C REVRS TRNSCRPJ: CPT | Performed by: NURSE PRACTITIONER

## 2023-02-26 PROCEDURE — 80074 ACUTE HEPATITIS PANEL: CPT | Performed by: NURSE PRACTITIONER

## 2023-02-26 PROCEDURE — 83036 HEMOGLOBIN GLYCOSYLATED A1C: CPT | Performed by: NURSE PRACTITIONER

## 2023-02-26 PROCEDURE — 86803 HEPATITIS C AB TEST: CPT | Performed by: NURSE PRACTITIONER

## 2023-02-26 PROCEDURE — 82607 VITAMIN B-12: CPT | Performed by: NURSE PRACTITIONER

## 2023-02-27 LAB — RPR SER QL: NORMAL

## 2023-02-28 LAB
HGB S BLD QL SOLY: NEGATIVE
HSV1 IGG SER IA-ACNC: 6.11 INDEX (ref 0–0.9)
HSV2 IGG SER IA-ACNC: <0.91 INDEX (ref 0–0.9)
RUBV IGG SERPL IA-ACNC: 1.78 INDEX

## 2023-03-01 LAB
HCV RNA SERPL NAA+PROBE-ACNC: NORMAL IU/ML
LEAD BLDV-MCNC: 5.8 UG/DL (ref 0–3.4)
TEST INFORMATION: NORMAL
ZPP RBC-MCNC: 32 UG/DL (ref 0–99)

## 2023-03-06 ENCOUNTER — ROUTINE PRENATAL (OUTPATIENT)
Dept: OBSTETRICS AND GYNECOLOGY | Facility: CLINIC | Age: 23
End: 2023-03-06
Payer: COMMERCIAL

## 2023-03-06 VITALS — SYSTOLIC BLOOD PRESSURE: 106 MMHG | WEIGHT: 217.2 LBS | BODY MASS INDEX: 38.48 KG/M2 | DIASTOLIC BLOOD PRESSURE: 64 MMHG

## 2023-03-06 DIAGNOSIS — R11.2 NAUSEA AND VOMITING, UNSPECIFIED VOMITING TYPE: ICD-10-CM

## 2023-03-06 DIAGNOSIS — Z34.91 PRENATAL CARE IN FIRST TRIMESTER: Primary | ICD-10-CM

## 2023-03-06 DIAGNOSIS — K80.20 CALCULUS OF GALLBLADDER WITHOUT CHOLECYSTITIS WITHOUT OBSTRUCTION: ICD-10-CM

## 2023-03-06 LAB
CFTR MUT ANL BLD/T: NORMAL
LABORATORY COMMENT REPORT: NORMAL

## 2023-03-06 PROCEDURE — 99213 OFFICE O/P EST LOW 20 MIN: CPT | Performed by: OBSTETRICS & GYNECOLOGY

## 2023-03-06 RX ORDER — METOCLOPRAMIDE 10 MG/1
10 TABLET ORAL
Qty: 45 TABLET | Refills: 5 | Status: SHIPPED | OUTPATIENT
Start: 2023-03-06

## 2023-03-06 NOTE — PROGRESS NOTES
Prenatal Care Visit    Subjective   Chief Complaint   Patient presents with   • Routine Prenatal Visit     Patient is experiencing morning illness now. Patient was seen in ED for gallstones.        History:   Carol is a  currently at 9w6d who presents for a prenatal care visit today.    Ongoing nausea.    Social History    Tobacco Use      Smoking status: Former        Types: Electronic Cigarette      Smokeless tobacco: Never      Tobacco comments: vapes       Objective   /64   Wt 98.5 kg (217 lb 3.2 oz)   LMP 2022   BMI 38.48 kg/m²   Physical Exam:  Normal, gestational age-appropriate exam today        Plan   Medical Decision Making:    I have reviewed the prenatal labs and ultrasound(s) today. I have reviewed the most recent prenatal progress note(s).    Diagnosis: Supervision of high risk pregnancy   Nausea and emesis  Cholelithiasis  BMI 38   Tests/Orders/Rx today: Orders Placed This Encounter   Procedures   • [id number] PLUS Core+SCA - Blood,     Order Specific Question:   [facility] Date of last menstrual period or estimated date of delivery (corresponding to calculation method):     Answer:   10/3/2023     Order Specific Question:   [facility] Gestational age calculation method:     Answer:   MACI,EDC     Order Specific Question:   Release to patient     Answer:   Routine Release       Medication Management: Add Unisom to B6, Add Reglan 10 mg with meals     Topics discussed: Prenatal care milestones  Nausea + Meds   If symptoms persist into the second trimester, general surgery can address the gallbladder if needed   Tests next visit: none   Next visit: 2 week(s)     Ludwig Hernandez MD  Obstetrics and Gynecology  [facility]

## 2023-03-10 LAB
CFDNA.FET/CFDNA.TOTAL SFR FETUS: NORMAL %
CITATION REF LAB TEST: NORMAL
CLINICAL GENETICS COUNSELING NOTE: NORMAL
CLINICAL INFO: NORMAL
ETHNIC BACKGROUND STATED: NORMAL
FET 13+18+21+X+Y ANEUP PLAS.CFDNA: NEGATIVE
FET CHR 21 TS PLAS.CFDNA QL: NEGATIVE
FET MS X RISK WBC.DNA+CFDNA QL: NOT DETECTED
FET SEX PLAS.CFDNA DOSAGE CFDNA: NORMAL
FET TS 13 RISK PLAS.CFDNA QL: NEGATIVE
FET TS 18 RISK WBC.DNA+CFDNA QL: NEGATIVE
FET X + Y ANEUP RISK PLAS.CFDNA SEQ-IMP: NOT DETECTED
GA EST FROM CONCEPTION DATE: NORMAL D
GESTATIONAL AGE > 9:: YES
LAB DIRECTOR NAME PROVIDER: NORMAL
LABORATORY COMMENT REPORT: NORMAL
LABORATORY COMMENT REPORT: NORMAL
LIMITATIONS OF THE TEST: NORMAL
NEGATIVE PREDICTIVE VALUE: NORMAL
NOTE: NORMAL
PERFORMANCE CHARACTERISTICS: NORMAL
POSITIVE PREDICTIVE VALUE: NORMAL
REASON FOR REFERRAL (NARRATIVE): NORMAL
REF LAB TEST METHOD: NORMAL
REF LAB TEST METHOD: NORMAL
SMN1 GENE MUT ANL BLD/T: NORMAL
SPECIMEN SOURCE: NORMAL
TEST PERFORMANCE INFO SPEC: NORMAL

## 2023-03-16 ENCOUNTER — HOSPITAL ENCOUNTER (EMERGENCY)
Facility: HOSPITAL | Age: 23
Discharge: HOME | End: 2023-03-16
Attending: EMERGENCY MEDICINE | Admitting: EMERGENCY MEDICINE
Payer: COMMERCIAL

## 2023-03-16 VITALS
OXYGEN SATURATION: 97 % | TEMPERATURE: 98.3 F | DIASTOLIC BLOOD PRESSURE: 72 MMHG | SYSTOLIC BLOOD PRESSURE: 123 MMHG | HEART RATE: 98 BPM | WEIGHT: 217 LBS | RESPIRATION RATE: 18 BRPM | HEIGHT: 63 IN | BODY MASS INDEX: 38.45 KG/M2

## 2023-03-16 DIAGNOSIS — R19.7 NAUSEA VOMITING AND DIARRHEA: Primary | ICD-10-CM

## 2023-03-16 DIAGNOSIS — R11.2 NAUSEA VOMITING AND DIARRHEA: Primary | ICD-10-CM

## 2023-03-16 LAB
ALBUMIN SERPL-MCNC: 3.8 G/DL (ref 3.5–5.2)
ALBUMIN/GLOB SERPL: 1.4 G/DL
ALP SERPL-CCNC: 71 U/L (ref 39–117)
ALT SERPL W P-5'-P-CCNC: 11 U/L (ref 1–33)
ANION GAP SERPL CALCULATED.3IONS-SCNC: 14.6 MMOL/L (ref 5–15)
AST SERPL-CCNC: 16 U/L (ref 1–32)
BASOPHILS # BLD AUTO: 0.04 10*3/MM3 (ref 0–0.2)
BASOPHILS NFR BLD AUTO: 0.3 % (ref 0–1.5)
BILIRUB SERPL-MCNC: 0.8 MG/DL (ref 0–1.2)
BUN SERPL-MCNC: 8 MG/DL (ref 6–20)
BUN/CREAT SERPL: 15.4 (ref 7–25)
CALCIUM SPEC-SCNC: 9.1 MG/DL (ref 8.6–10.5)
CHLORIDE SERPL-SCNC: 101 MMOL/L (ref 98–107)
CO2 SERPL-SCNC: 18.4 MMOL/L (ref 22–29)
CREAT SERPL-MCNC: 0.52 MG/DL (ref 0.57–1)
DEPRECATED RDW RBC AUTO: 37.6 FL (ref 37–54)
EGFRCR SERPLBLD CKD-EPI 2021: 136.6 ML/MIN/1.73
EOSINOPHIL # BLD AUTO: 0.15 10*3/MM3 (ref 0–0.4)
EOSINOPHIL NFR BLD AUTO: 1.2 % (ref 0.3–6.2)
ERYTHROCYTE [DISTWIDTH] IN BLOOD BY AUTOMATED COUNT: 12.5 % (ref 12.3–15.4)
GLOBULIN UR ELPH-MCNC: 2.8 GM/DL
GLUCOSE SERPL-MCNC: 119 MG/DL (ref 65–99)
HCT VFR BLD AUTO: 35.4 % (ref 34–46.6)
HGB BLD-MCNC: 12.6 G/DL (ref 12–15.9)
HOLD SPECIMEN: NORMAL
HOLD SPECIMEN: NORMAL
IMM GRANULOCYTES # BLD AUTO: 0.08 10*3/MM3 (ref 0–0.05)
IMM GRANULOCYTES NFR BLD AUTO: 0.7 % (ref 0–0.5)
LIPASE SERPL-CCNC: 17 U/L (ref 13–60)
LYMPHOCYTES # BLD AUTO: 1.2 10*3/MM3 (ref 0.7–3.1)
LYMPHOCYTES NFR BLD AUTO: 9.9 % (ref 19.6–45.3)
MCH RBC QN AUTO: 29.3 PG (ref 26.6–33)
MCHC RBC AUTO-ENTMCNC: 35.6 G/DL (ref 31.5–35.7)
MCV RBC AUTO: 82.3 FL (ref 79–97)
MONOCYTES # BLD AUTO: 0.57 10*3/MM3 (ref 0.1–0.9)
MONOCYTES NFR BLD AUTO: 4.7 % (ref 5–12)
NEUTROPHILS NFR BLD AUTO: 10.1 10*3/MM3 (ref 1.7–7)
NEUTROPHILS NFR BLD AUTO: 83.2 % (ref 42.7–76)
NRBC BLD AUTO-RTO: 0 /100 WBC (ref 0–0.2)
PLATELET # BLD AUTO: 251 10*3/MM3 (ref 140–450)
PMV BLD AUTO: 10.3 FL (ref 6–12)
POTASSIUM SERPL-SCNC: 3.6 MMOL/L (ref 3.5–5.2)
PROT SERPL-MCNC: 6.6 G/DL (ref 6–8.5)
RBC # BLD AUTO: 4.3 10*6/MM3 (ref 3.77–5.28)
SODIUM SERPL-SCNC: 134 MMOL/L (ref 136–145)
WBC NRBC COR # BLD: 12.14 10*3/MM3 (ref 3.4–10.8)
WHOLE BLOOD HOLD COAG: NORMAL
WHOLE BLOOD HOLD SPECIMEN: NORMAL

## 2023-03-16 PROCEDURE — 99283 EMERGENCY DEPT VISIT LOW MDM: CPT

## 2023-03-16 PROCEDURE — 80053 COMPREHEN METABOLIC PANEL: CPT | Performed by: EMERGENCY MEDICINE

## 2023-03-16 PROCEDURE — 25010000002 ONDANSETRON PER 1 MG: Performed by: EMERGENCY MEDICINE

## 2023-03-16 PROCEDURE — 85025 COMPLETE CBC W/AUTO DIFF WBC: CPT | Performed by: EMERGENCY MEDICINE

## 2023-03-16 PROCEDURE — 96374 THER/PROPH/DIAG INJ IV PUSH: CPT

## 2023-03-16 PROCEDURE — 83690 ASSAY OF LIPASE: CPT | Performed by: EMERGENCY MEDICINE

## 2023-03-16 RX ORDER — SODIUM CHLORIDE 0.9 % (FLUSH) 0.9 %
10 SYRINGE (ML) INJECTION AS NEEDED
Status: DISCONTINUED | OUTPATIENT
Start: 2023-03-16 | End: 2023-03-16 | Stop reason: HOSPADM

## 2023-03-16 RX ORDER — ONDANSETRON 2 MG/ML
4 INJECTION INTRAMUSCULAR; INTRAVENOUS ONCE
Status: COMPLETED | OUTPATIENT
Start: 2023-03-16 | End: 2023-03-16

## 2023-03-16 RX ADMIN — ONDANSETRON 4 MG: 2 INJECTION INTRAMUSCULAR; INTRAVENOUS at 09:40

## 2023-03-16 RX ADMIN — SODIUM CHLORIDE 1000 ML: 9 INJECTION, SOLUTION INTRAVENOUS at 09:37

## 2023-03-16 NOTE — ED PROVIDER NOTES
"Subjective   History of Present Illness  22-year-old female at 11 weeks pregnant presents to the ED with nausea and vomiting.  Patient states that her symptoms have been ongoing for 2 to 3 days.  She has also had some diarrhea and chills.  Patient believes she has a viral illness.  She has been doing well throughout the pregnancy without much nausea or vomiting.  Patient denies abdominal or pelvic pain        Review of Systems   Constitutional: Positive for chills.   Gastrointestinal: Positive for nausea and vomiting. Negative for anal bleeding.   All other systems reviewed and are negative.      Past Medical History:   Diagnosis Date   • Anxiety    • Anxiety    • Chlamydia    • Depression    • Endometriosis    • Gonorrhea    • Kidney stone    • Major depression    • Migraine    • PMS (premenstrual syndrome)    • PTSD (post-traumatic stress disorder)    • Seizures ([facility])    • Self-injurious behavior     hx of cutting starting at age 15   • Suicide attempt ([facility])     3/25/19-overdose attempt, 2017 overdose attempt   • Urinary tract infection        Allergies   Allergen Reactions   • Iodine Hives   • Latex Hives       Past Surgical History:   Procedure Laterality Date   • ESOPHAGEAL DILATATION     • TONSILLECTOMY         Family History   Problem Relation Age of Onset   • Rheum arthritis Mother    • Depression Mother    • Drug abuse Mother    • Heart murmur Father    • Drug abuse Father    • Alcohol abuse Father        Social History     Socioeconomic History   • Marital status: Single   Tobacco Use   • Smoking status: Former     Types: Electronic Cigarette   • Smokeless tobacco: Never   • Tobacco comments:     vapes   Vaping Use   • Vaping Use: Every day   Substance and Sexual Activity   • Alcohol use: Not Currently     Comment: \"I barely ever drink alcohol.\"   • Drug use: Not Currently     Types: Marijuana     Comment: once monthly   • Sexual activity: Yes     Partners: Male     Birth control/protection: None "           Objective   Physical Exam  Vitals and nursing note reviewed.   Constitutional:       Appearance: Normal appearance.   HENT:      Head: Normocephalic and atraumatic.      Nose: Nose normal.   Eyes:      Extraocular Movements: Extraocular movements intact.      Pupils: Pupils are equal, round, and reactive to light.   Cardiovascular:      Rate and Rhythm: Normal rate.   Pulmonary:      Effort: Pulmonary effort is normal.   Abdominal:      General: Abdomen is flat.      Tenderness: There is no abdominal tenderness.   Neurological:      Mental Status: She is alert.         Procedures           ED Course                                           MDM  Chief complaint: Nausea vomiting diarrhea    Initial impression of presenting illness: Well-appearing nontoxic 22-year-old female with nausea vomiting and diarrhea.  She is 11 weeks pregnant with past pelvic pain.  Denies significant nausea and vomiting throughout the pregnancy.  Patient believes she likely has a GI illness.    Comorbidities requiring consideration and/or management:    Differential diagnosis includes but not limited to: Viral gastroenteritis, nausea vomiting pregnancy, other    Patient arrives hemodynamic stable, afebrile, without respiratory distress with initial vitals interpreted by myself.  Pertinent initial vitals include within normal limit    Pertinent features from physical exam: Well-appearing nontoxic heart regular rate and rhythm.  Abdomen soft nontender nondistended    Initial diagnostic plan: CBC, CMP, lipase, urinalysis will consider imaging if needed but patient does not have any pelvic pain.  Has already had an ultrasound confirming IUP    Results from initial plan were reviewed and interpreted by myself and include: CBC is reassuring.  Minimally elevated white blood cell count with a slight left shift.  CMP is reassuring.  Lipase is normal.  Normal LFTs      Diagnostic information from other sources includes: Review of previous  visits, prior labs, prior imaging, available notes from prior evaluations or visits with specialists, medication list, allergies, past medical history, past surgical history    Interventions in the ED included: IV fluid rehydration, IV Zofran.    Reevaluation: Resting comfortably, symptoms improved.    Results/clinical rationale were discussed with patient and significant other    Consultations and discussions of results with other physicians: N/A    Discussion of results/management/plan:    Disposition plan: Appropriate for discharge follow-up outpatient as needed.  Suspect most likely viral gastroenteritis.  Next  Final diagnoses:   Nausea vomiting and diarrhea       ED Disposition  ED Disposition     ED Disposition   Discharge    Condition   Stable    Comment   --             Hafsa Woo, APRN  [address] [address] [phone]               Medication List      No changes were made to your prescriptions during this visit.          Gage Rodgers, DO  03/18/23 1927

## 2023-03-20 ENCOUNTER — ROUTINE PRENATAL (OUTPATIENT)
Dept: OBSTETRICS AND GYNECOLOGY | Facility: CLINIC | Age: 23
End: 2023-03-20
Payer: COMMERCIAL

## 2023-03-20 VITALS — WEIGHT: 219 LBS | BODY MASS INDEX: 38.79 KG/M2 | SYSTOLIC BLOOD PRESSURE: 122 MMHG | DIASTOLIC BLOOD PRESSURE: 70 MMHG

## 2023-03-20 DIAGNOSIS — Z34.01 ENCOUNTER FOR SUPERVISION OF NORMAL FIRST PREGNANCY IN FIRST TRIMESTER: Primary | ICD-10-CM

## 2023-03-20 PROCEDURE — 99213 OFFICE O/P EST LOW 20 MIN: CPT | Performed by: MIDWIFE

## 2023-03-20 NOTE — PROGRESS NOTES
Chief Complaint   Patient presents with   • Routine Prenatal Visit     No Complaints/concerns        HPI: Carol is a  currently at 11w6d here for prenatal visit who reports the following:  Nausea has improved. Denies any problems                EXAM:     Vitals:    23 1346   BP: 122/70      Abdomen:   See prenatal flowsheet as noted and reviewed, soft, nontender   Pelvic:  See prenatal flowsheet as noted and reviewed   Urine:  See prenatal flowsheet as noted and reviewed    Lab Results   Component Value Date    ABO O 2023    RH Positive 2023    ABSCRN Negative 2023       MDM:  Impression: Supervision of low risk pregnancy   Tests done today: none   Topics discussed: fundal growth  Reviewed OB labs   Tests next visit: none                RTO:                        4 weeks    This note was electronically signed.  Heather Joyner, GWEN  3/20/2023

## 2023-04-10 ENCOUNTER — HOSPITAL ENCOUNTER (EMERGENCY)
Facility: HOSPITAL | Age: 23
Discharge: HOME | End: 2023-04-11
Attending: EMERGENCY MEDICINE | Admitting: EMERGENCY MEDICINE
Payer: COMMERCIAL

## 2023-04-10 DIAGNOSIS — R82.71 ASYMPTOMATIC BACTERIURIA DURING PREGNANCY IN SECOND TRIMESTER: ICD-10-CM

## 2023-04-10 DIAGNOSIS — R51.9 NONINTRACTABLE HEADACHE, UNSPECIFIED CHRONICITY PATTERN, UNSPECIFIED HEADACHE TYPE: Primary | ICD-10-CM

## 2023-04-10 DIAGNOSIS — O99.891 ASYMPTOMATIC BACTERIURIA DURING PREGNANCY IN SECOND TRIMESTER: ICD-10-CM

## 2023-04-10 PROCEDURE — 99283 EMERGENCY DEPT VISIT LOW MDM: CPT

## 2023-04-10 PROCEDURE — 81001 URINALYSIS AUTO W/SCOPE: CPT | Performed by: EMERGENCY MEDICINE

## 2023-04-11 VITALS
HEIGHT: 63 IN | HEART RATE: 98 BPM | TEMPERATURE: 100 F | DIASTOLIC BLOOD PRESSURE: 74 MMHG | SYSTOLIC BLOOD PRESSURE: 129 MMHG | BODY MASS INDEX: 38.45 KG/M2 | RESPIRATION RATE: 18 BRPM | WEIGHT: 217 LBS | OXYGEN SATURATION: 95 %

## 2023-04-11 LAB
AMORPH URATE CRY URNS QL MICRO: ABNORMAL /HPF
BACTERIA UR QL AUTO: ABNORMAL /HPF
BILIRUB UR QL STRIP: NEGATIVE
CLARITY UR: ABNORMAL
COLOR UR: YELLOW
FLUAV RNA RESP QL NAA+PROBE: NOT DETECTED
FLUBV RNA RESP QL NAA+PROBE: NOT DETECTED
GLUCOSE UR STRIP-MCNC: NEGATIVE MG/DL
HGB UR QL STRIP.AUTO: NEGATIVE
HYALINE CASTS UR QL AUTO: ABNORMAL /LPF
KETONES UR QL STRIP: ABNORMAL
LEUKOCYTE ESTERASE UR QL STRIP.AUTO: NEGATIVE
NITRITE UR QL STRIP: POSITIVE
PH UR STRIP.AUTO: 6.5 [PH] (ref 5–8)
PROT UR QL STRIP: NEGATIVE
RBC # UR STRIP: ABNORMAL /HPF
REF LAB TEST METHOD: ABNORMAL
SARS-COV-2 RNA RESP QL NAA+PROBE: NOT DETECTED
SP GR UR STRIP: 1.01 (ref 1–1.03)
SQUAMOUS #/AREA URNS HPF: ABNORMAL /HPF
UROBILINOGEN UR QL STRIP: ABNORMAL
WBC # UR STRIP: ABNORMAL /HPF

## 2023-04-11 PROCEDURE — 25010000002 METOCLOPRAMIDE PER 10 MG: Performed by: EMERGENCY MEDICINE

## 2023-04-11 PROCEDURE — 25010000002 DIPHENHYDRAMINE PER 50 MG: Performed by: EMERGENCY MEDICINE

## 2023-04-11 PROCEDURE — 96375 TX/PRO/DX INJ NEW DRUG ADDON: CPT

## 2023-04-11 PROCEDURE — 87636 SARSCOV2 & INF A&B AMP PRB: CPT | Performed by: EMERGENCY MEDICINE

## 2023-04-11 PROCEDURE — 96361 HYDRATE IV INFUSION ADD-ON: CPT

## 2023-04-11 PROCEDURE — 96374 THER/PROPH/DIAG INJ IV PUSH: CPT

## 2023-04-11 RX ORDER — DIPHENHYDRAMINE HYDROCHLORIDE 50 MG/ML
12.5 INJECTION INTRAMUSCULAR; INTRAVENOUS ONCE
Status: COMPLETED | OUTPATIENT
Start: 2023-04-11 | End: 2023-04-11

## 2023-04-11 RX ORDER — SODIUM CHLORIDE 0.9 % (FLUSH) 0.9 %
10 SYRINGE (ML) INJECTION AS NEEDED
Status: DISCONTINUED | OUTPATIENT
Start: 2023-04-11 | End: 2023-04-11 | Stop reason: HOSPADM

## 2023-04-11 RX ORDER — CEPHALEXIN 500 MG/1
500 CAPSULE ORAL 2 TIMES DAILY
Qty: 6 CAPSULE | Refills: 0 | Status: SHIPPED | OUTPATIENT
Start: 2023-04-11

## 2023-04-11 RX ORDER — METOCLOPRAMIDE HYDROCHLORIDE 5 MG/ML
5 INJECTION INTRAMUSCULAR; INTRAVENOUS ONCE
Status: COMPLETED | OUTPATIENT
Start: 2023-04-11 | End: 2023-04-11

## 2023-04-11 RX ORDER — METOCLOPRAMIDE 5 MG/1
5 TABLET ORAL
Qty: 12 TABLET | Refills: 0 | Status: SHIPPED | OUTPATIENT
Start: 2023-04-11

## 2023-04-11 RX ADMIN — Medication 10 ML: at 00:41

## 2023-04-11 RX ADMIN — METOCLOPRAMIDE 5 MG: 5 INJECTION, SOLUTION INTRAMUSCULAR; INTRAVENOUS at 00:34

## 2023-04-11 RX ADMIN — DIPHENHYDRAMINE HYDROCHLORIDE 12.5 MG: 50 INJECTION, SOLUTION INTRAMUSCULAR; INTRAVENOUS at 00:35

## 2023-04-11 RX ADMIN — SODIUM CHLORIDE 1000 ML: 9 INJECTION, SOLUTION INTRAVENOUS at 00:34

## 2023-04-14 NOTE — ED PROVIDER NOTES
"Subjective  History of Present Illness:    Chief Complaint: Migraine, unable to find fetal heart tones at home with home Doppler 15 weeks pregnant  History of Present Illness: 22-year-old presents for evaluation above complaint, has had symptoms over the last week and a half.  No urinary symptoms, also had some left upper quadrant discomfort    Nurses Notes reviewed and agree, including vitals, allergies, social history and prior medical history.     REVIEW OF SYSTEMS: All systems reviewed and not pertinent unless noted.  Review of Systems      Positive for: Left upper quadrant discomfort, headache for the past week and a half    Negative for: Fever vomiting diarrhea GI bleeding flank pain urinary symptoms shortness of breath cough sick contacts travel    Past Medical History:   Diagnosis Date   • Anxiety    • Anxiety    • Chlamydia    • Depression    • Endometriosis    • Gonorrhea    • Kidney stone    • Major depression    • Migraine    • PMS (premenstrual syndrome)    • PTSD (post-traumatic stress disorder)    • Seizures    • Self-injurious behavior     hx of cutting starting at age 15   • Suicide attempt     3/25/19-overdose attempt, 2017 overdose attempt   • Urinary tract infection        Allergies:    Iodine and Latex      Past Surgical History:   Procedure Laterality Date   • ESOPHAGEAL DILATATION     • TONSILLECTOMY           Social History     Socioeconomic History   • Marital status: Single   Tobacco Use   • Smoking status: Former     Types: Electronic Cigarette   • Smokeless tobacco: Never   • Tobacco comments:     vapes   Vaping Use   • Vaping Use: Every day   Substance and Sexual Activity   • Alcohol use: Not Currently     Comment: \"I barely ever drink alcohol.\"   • Drug use: Not Currently     Types: Marijuana     Comment: once monthly   • Sexual activity: Yes     Partners: Male     Birth control/protection: None         Family History   Problem Relation Age of Onset   • Rheum arthritis Mother    • " "Depression Mother    • Drug abuse Mother    • Heart murmur Father    • Drug abuse Father    • Alcohol abuse Father        Objective  Physical Exam:  /74   Pulse 98   Temp 100 °F (37.8 °C) (Oral)   Resp 18   Ht 160 cm (63\")   Wt 98.4 kg (217 lb)   LMP 12/27/2022   SpO2 95%   BMI 38.44 kg/m²      Physical Exam    CONSTITUTIONAL: Well developed, nontoxic healthy-appearing 22-year-old female,  in no acute distress.  VITAL SIGNS: per nursing, reviewed and noted  SKIN: exposed skin with no rashes, ulcerations or petechiae  EYES: Grossly EOMI, no icterus no anisocoria  ENT: Normal voice.  Moist mucous membranes   RESPIRATORY:  No increased work of breathing. No retractions.   CARDIOVASCULAR:   Extremities pink and warm.  Good cap refill to extremities.   GI: Abdomen without distention.  No tenderness  MUSCULOSKELETAL: Age-appropriate bulk and tone, moves all 4 extremities  NEUROLOGIC: Alert, oriented x 3. No gross deficits. GCS 15.  No meningismus.  PSYCH: appropriate affect.  : no bladder tenderness or distention, no CVA tenderness    Procedures  Bedside transabdominal ultrasound  Indications left upper quadrant tenderness, second trimester pregnancy, 15 weeks gestation  Findings single living intrauterine pregnancy heart rate approximately 140s.  Good fetal movement.  ED Course:    Lab Results (last 24 hours)     ** No results found for the last 24 hours. **           No radiology results from the last 24 hrs     MDM         Medical Decision Making:    Initial impression of presenting illness: Patient presents with headache for a week and a half, described as migrainous, also left upper quadrant pain    DDX: includes but is not limited to: UTI, pregnancy complication, migraine, viral syndrome,         Patient arrives normotensive low-grade fever temp of 100 no tachycardia oxygen saturations 95% with vitals interpreted by myself.     Pertinent features from physical exam: Benign abdominal exam, no " meningismus.  Neuro intact.    Initial diagnostic plan: IV fluids Reglan Benadryl, UA bedside ultrasound, flu and COVID    Results from initial plan were reviewed and interpreted by me revealing flu and COVID-negative, reassuring bedside transabdominal ultrasound completed by me living single intrauterine pregnancy, UA with positive nitrites trace ketones trace bacteria    Diagnostic information from other sources: None    Interventions / Re-evaluation: Feeling better after interventions    Results/clinical rationale were discussed with patient and family member at the bedside    Consultations/Discussion of results with other physicians: None    Disposition plan: Will add antibiotics regarding the patient bacteriuria in pregnancy, prescription Reglan.  Supportive care recommendations outpatient follow return precautions discussed  -----      Final diagnoses:   Nonintractable headache, unspecified chronicity pattern, unspecified headache type   Asymptomatic bacteriuria during pregnancy in second trimester        Kishor Marquez,   04/14/23 1624       Kishor Marquez,   04/14/23 1624

## 2023-04-17 ENCOUNTER — ROUTINE PRENATAL (OUTPATIENT)
Dept: OBSTETRICS AND GYNECOLOGY | Facility: CLINIC | Age: 23
End: 2023-04-17
Payer: COMMERCIAL

## 2023-04-17 VITALS — BODY MASS INDEX: 39.68 KG/M2 | WEIGHT: 224 LBS | SYSTOLIC BLOOD PRESSURE: 110 MMHG | DIASTOLIC BLOOD PRESSURE: 74 MMHG

## 2023-04-17 DIAGNOSIS — Z34.92 SECOND TRIMESTER PREGNANCY: Primary | ICD-10-CM

## 2023-04-17 RX ORDER — FLUOXETINE 10 MG/1
1 CAPSULE ORAL DAILY
COMMUNITY
Start: 2023-04-11

## 2023-04-17 NOTE — PROGRESS NOTES
Chief Complaint   Patient presents with   • Routine Prenatal Visit     Prenatal visit with no problems or concerns. She does need to discuss her medications.        HPI:   , 15w6d gestation reports doing well    ROS:  See Prenatal Episode/Flowsheet  /74   Wt 102 kg (224 lb)   LMP 2022   BMI 39.68 kg/m²      EXAM:  EXTREMITIES:  No swelling-See Prenatal Episode/Flowsheet    ABDOMEN:  FHTs/Movement noted-See Prenatal Episode/Flowsheet    URINE GLUCOSE/PROTEIN:  See Prenatal Episode/Flowsheet    PELVIC EXAM:  See Prenatal Episode/Flowsheet  CV:  Lungs:  GYN:    MDM:    Lab Results   Component Value Date    HGB 12.6 2023    RUBELLAABIGG 1.78 2023    HEPBSAG Non-Reactive 2023    HEPBSAG Non-Reactive 2023    ABO O 2023    RH Positive 2023    ABSCRN Negative 2023    [id number] Non-Reactive 2023    HEPCVIRUSABY Non-Reactive 2023    HEPCVIRUSABY Non-Reactive 2023    URINECX Final report (A) 2021       U/S:US Ob Transvaginal (2023 08:50)      1. IUP 15w6d  2. Routine care   3. Normal anatomy  4. Anatomy U/S 3 weeks

## 2023-05-09 ENCOUNTER — ROUTINE PRENATAL (OUTPATIENT)
Dept: OBSTETRICS AND GYNECOLOGY | Facility: CLINIC | Age: 23
End: 2023-05-09
Payer: COMMERCIAL

## 2023-05-09 VITALS — DIASTOLIC BLOOD PRESSURE: 72 MMHG | BODY MASS INDEX: 38.62 KG/M2 | SYSTOLIC BLOOD PRESSURE: 122 MMHG | WEIGHT: 218 LBS

## 2023-05-09 DIAGNOSIS — R51.9 FREQUENT HEADACHES: ICD-10-CM

## 2023-05-09 DIAGNOSIS — F41.8 ANXIETY WITH DEPRESSION: ICD-10-CM

## 2023-05-09 DIAGNOSIS — R20.2 BILATERAL NUMBNESS AND TINGLING OF ARMS AND LEGS: ICD-10-CM

## 2023-05-09 DIAGNOSIS — H53.9 VISUAL CHANGES: ICD-10-CM

## 2023-05-09 DIAGNOSIS — R11.2 NAUSEA AND VOMITING, UNSPECIFIED VOMITING TYPE: ICD-10-CM

## 2023-05-09 DIAGNOSIS — F43.10 PTSD (POST-TRAUMATIC STRESS DISORDER): ICD-10-CM

## 2023-05-09 DIAGNOSIS — Z36.89 ENCOUNTER FOR FETAL ANATOMIC SURVEY: ICD-10-CM

## 2023-05-09 DIAGNOSIS — O09.92 ENCOUNTER FOR SUPERVISION OF HIGH RISK PREGNANCY IN SECOND TRIMESTER, ANTEPARTUM: Primary | ICD-10-CM

## 2023-05-09 DIAGNOSIS — R20.0 BILATERAL NUMBNESS AND TINGLING OF ARMS AND LEGS: ICD-10-CM

## 2023-05-10 NOTE — PROGRESS NOTES
Chief Complaint  Routine Prenatal Visit (Anatomy scan today, patient complains of blurred vision, seeing spots, nausea. Patient advised has been under a lot of stress lately. )    History of Present Illness:  Carol is a  currently at 19w1d who presents today with complaints of blurred vision and scotomata.  She has been having frequent headaches as well.  She also reports having episodes of tingling in her arms and legs.  She does report she has been under significant stress.  She has had 2 grandparents  in the last several months and the baby of the father's family is considering trying to take the baby when it is born.  Patient denies any other symptoms.  She continues to have the nausea and occasional emesis.  She has been taking her prenatal vitamins.  Has continued on her Prozac.    Exam:  Vitals:  See prenatal flowsheet as noted and reviewed  General: Alert, cooperative, and does not appear in any distress  Abdomen:   See prenatal flowsheet as noted and reviewed    Uterus gravid, non-tender; no palpable masses    No guarding or rebound tenderness  Pelvic:  See prenatal flowsheet as noted and reviewed  Ext:  See prenatal flowsheet as noted and reviewed    Moves extremities well, no cyanosis and no redness  Urine:  See prenatal flowsheet as noted and reviewed    Data Review:  The following data was reviewed by: Eve Ricci MD on 2023:  Prenatal Labs:  Lab Results   Component Value Date    HGB 12.6 2023    RUBELLAABIGG 1.78 2023    HEPBSAG Non-Reactive 2023    HEPBSAG Non-Reactive 2023    ABO O 2023    RH Positive 2023    ABSCRN Negative 2023    ZVQ0LIS3 Non-Reactive 2023    HEPCVIRUSABY Non-Reactive 2023    HEPCVIRUSABY Non-Reactive 2023    URINECX Final report (A) 2021       Admission on 04/10/2023, Discharged on 2023   Component Date Value   • Color, UA 04/10/2023 Yellow    • Appearance, UA 04/10/2023 Cloudy (A)    • pH,  UA 04/10/2023 6.5    • Specific Gravity, UA 04/10/2023 1.015    • Glucose, UA 04/10/2023 Negative    • Ketones, UA 04/10/2023 Trace (A)    • Bilirubin, UA 04/10/2023 Negative    • Blood, UA 04/10/2023 Negative    • Protein, UA 04/10/2023 Negative    • Leuk Esterase, UA 04/10/2023 Negative    • Nitrite, UA 04/10/2023 Positive (A)    • Urobilinogen, UA 04/10/2023 2.0 E.U./dL (A)    • RBC, UA 04/10/2023 None Seen    • WBC, UA 04/10/2023 None Seen    • Bacteria, UA 04/10/2023 Trace (A)    • Squamous Epithelial Cell* 04/10/2023 3-6 (A)    • Hyaline Casts, UA 04/10/2023 None Seen    • Amorphous Crystals, UA 04/10/2023 Small/1+    • Methodology 04/10/2023 Manual Light Microscopy    • COVID19 2023 Not Detected    • Influenza A PCR 2023 Not Detected    • Influenza B PCR 2023 Not Detected      Imaging:  US Ob 14 + Weeks Single or First Gestation  Carol Vega  : 2000  MRN: [id number]  Date: 2023    Reason for exam/History:  Anatomic Survey    Ultrasound images are reviewed.  There is noted to be a viable   intrauterine pregnancy.   The pregnancy is measuring 19 weeks 4 days   gestation.  The fetal heart rate was normal.  Normal anatomy was noted.   The placental location was noted to be posterior.  The amniotic fluid was   normal.    The exam limitations noted:  none    See the official report for actual measurements and structures seen.    Eve Ricci MD, [facility]  OB GYN [place]     Medical Records:  None    Assessment and Plan:  Problem List Items Addressed This Visit        Mental Health    PTSD (post-traumatic stress disorder)   Other Visit Diagnoses     Encounter for supervision of high risk pregnancy in second trimester, antepartum    -  Primary  Topics discussed:     ab precautions  genetic screening - Today we discussed genetic testing.  She is aware that the MSAFP-4 is a screening test.  A screening test is not a diagnostic test.  This means that a negative test  does not guarantee an unaffected fetus and a positive test does not mean the fetus has the condition for which the test is being performed.  If the test returns positive, a diagnostic test should be consider to determine if the fetus in fact has the condition.  After considering the options previously presented, she is interested in having genetic testing performed.  GERD management  kick counts and fetal movement  PIH precautions  Labs today as noted    Relevant Orders    Alpha Fetoprotein, Maternal    Encounter for fetal anatomic survey      Anatomic scan today.  Patient informed regarding those findings.    Relevant Orders    US Ob 14 + Weeks Single or First Gestation (Completed)    Nausea and vomiting, unspecified vomiting type      Patient is to continue her Reglan as previously given.  Instructions and precautions have been given.    Anxiety with depression      Patient is to continue her Prozac as noted.  She is to call if worsening and/or changes in her symptoms.    Visual changes      Obtain labs today as noted.  Patient is to call for her results.    Relevant Orders    CBC (No Diff)    Protein / Creatinine Ratio, Urine - Urine, Clean Catch    Comprehensive Metabolic Panel    Frequent headaches      Labs today as noted.  Patient is to call if worsening and/or changes in her symptoms as discussed.  Plan pending results.    Relevant Orders    CBC (No Diff)    Protein / Creatinine Ratio, Urine - Urine, Clean Catch    Comprehensive Metabolic Panel    Bilateral numbness and tingling of arms and legs      Patient with episodes of numbness and tingling as noted.  She does have known anxiety as well.  She is to call if worsening and/or changes in her symptoms.  She is to continue her Prozac at present.        Follow Up/Instructions:  Follow up as scheduled.  Patient was given instructions and counseling regarding her condition or for health maintenance advice. Please see specific information pulled into the AVS if  appropriate.     Note: Speech recognition transcription software may have been used to dictate portions of this document.  An attempt at proofreading has been made though minor errors in transcription may still be present.    This note was electronically signed.  Eve Ricci M.D.

## 2023-05-11 LAB
AFP INTERP SERPL-IMP: NORMAL
AFP INTERP SERPL-IMP: NORMAL
AFP MOM SERPL: 0.77
AFP SERPL-MCNC: 31.5 NG/ML
AGE AT DELIVERY: 21 YR
ALBUMIN SERPL-MCNC: 3.7 G/DL (ref 3.9–5)
ALBUMIN/GLOB SERPL: 1.5 {RATIO} (ref 1.2–2.2)
ALP SERPL-CCNC: 64 IU/L (ref 42–106)
ALT SERPL-CCNC: 8 IU/L (ref 0–32)
AST SERPL-CCNC: 15 IU/L (ref 0–40)
BILIRUB SERPL-MCNC: 0.6 MG/DL (ref 0–1.2)
BUN SERPL-MCNC: 6 MG/DL (ref 6–20)
BUN/CREAT SERPL: 12 (ref 9–23)
CALCIUM SERPL-MCNC: 8.9 MG/DL (ref 8.7–10.2)
CHLORIDE SERPL-SCNC: 107 MMOL/L (ref 96–106)
CO2 SERPL-SCNC: 18 MMOL/L (ref 20–29)
CREAT SERPL-MCNC: 0.49 MG/DL (ref 0.57–1)
EGFRCR SERPLBLD CKD-EPI 2021: 138 ML/MIN/1.73
ERYTHROCYTE [DISTWIDTH] IN BLOOD BY AUTOMATED COUNT: 15.1 % (ref 11.7–15.4)
GA METHOD: NORMAL
GA: 19 WEEKS
GLOBULIN SER CALC-MCNC: 2.5 G/DL (ref 1.5–4.5)
GLUCOSE SERPL-MCNC: 79 MG/DL (ref 70–99)
HCT VFR BLD AUTO: 33.7 % (ref 34–46.6)
HGB BLD-MCNC: 11.5 G/DL (ref 11.1–15.9)
IDDM PATIENT QL: NO
LABORATORY COMMENT REPORT: NORMAL
MCH RBC QN AUTO: 30.3 PG (ref 26.6–33)
MCHC RBC AUTO-ENTMCNC: 34.1 G/DL (ref 31.5–35.7)
MCV RBC AUTO: 89 FL (ref 79–97)
MULTIPLE PREGNANCY: NO
NEURAL TUBE DEFECT RISK FETUS: NORMAL %
PLATELET # BLD AUTO: 256 X10E3/UL (ref 150–450)
POTASSIUM SERPL-SCNC: 4 MMOL/L (ref 3.5–5.2)
PROT SERPL-MCNC: 6.2 G/DL (ref 6–8.5)
RBC # BLD AUTO: 3.79 X10E6/UL (ref 3.77–5.28)
RESULT: NORMAL
SODIUM SERPL-SCNC: 140 MMOL/L (ref 134–144)
WBC # BLD AUTO: 11.1 X10E3/UL (ref 3.4–10.8)

## 2023-06-05 ENCOUNTER — ROUTINE PRENATAL (OUTPATIENT)
Dept: OBSTETRICS AND GYNECOLOGY | Facility: CLINIC | Age: 23
End: 2023-06-05
Payer: COMMERCIAL

## 2023-06-05 VITALS — DIASTOLIC BLOOD PRESSURE: 54 MMHG | SYSTOLIC BLOOD PRESSURE: 102 MMHG | BODY MASS INDEX: 40.92 KG/M2 | WEIGHT: 231 LBS

## 2023-06-05 DIAGNOSIS — Z34.92 PRENATAL CARE IN SECOND TRIMESTER: Primary | ICD-10-CM

## 2023-06-05 PROCEDURE — 99214 OFFICE O/P EST MOD 30 MIN: CPT | Performed by: STUDENT IN AN ORGANIZED HEALTH CARE EDUCATION/TRAINING PROGRAM

## 2023-06-05 NOTE — PROGRESS NOTES
Prenatal Care Visit    Subjective   Chief Complaint   Patient presents with    Routine Prenatal Visit     History:   Carol is a  currently at 22w6d who presents for a prenatal care visit today.    Denies CTX, VB, LOF. Reports (+) FM.     Objective   Wt 105 kg (231 lb)   LMP 2022   BMI 40.92 kg/m²   Physical Exam:  Normal, gestational age-appropriate exam today      Assessment & Plan     IUP @ 22w6d  Routine care: I have reviewed the prenatal labs and ultrasound(s) today. I have reviewed the most recent prenatal progress note(s).   Anxiety/ depression: no longer taking prozac, reports stable mood.   H/o PTDS, nightmares: currently resolved, discussed option for prazosin if they recur     Diagnosis Plan   1. Prenatal care in second trimester           Medication Management: Continue PNV. Discussed option to restart SSRI as needed. Discussed prazosin for nightmares.    Topics discussed: Prenatal care milestones  Kick counts and fetal movement   labor signs and symptoms   Tests next visit: CBC, GTT   Next visit: 4 week(s)     Tenisha Lane MD  Obstetrics and Gynecology  [facility]

## 2023-07-12 ENCOUNTER — TELEPHONE (OUTPATIENT)
Dept: OBSTETRICS AND GYNECOLOGY | Facility: CLINIC | Age: 23
End: 2023-07-12
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
## 2023-07-20 NOTE — PROGRESS NOTES
Benjamin Vega is a 22 y.o. female.   Chief Complaint   Patient presents with    Cholelithiasis        History of Present Illness   Ms. Vega is a 23 yo female patient referred for evaluation of RUQ pain and recently diagnosed gallstones.  Notably, she is a a , currently at 30w1d gestation.  She reports that she has been having intermittent RUQ abdominal pain with associated nausea.  She denies recent vomiting or diarrhea.  She describes the pain as postprandial in nature.  She has modified her diet with some improvement in her symptoms.  She denies jaundice, acholic stools, or dark urine. She underwent a gallbladder ultrasound in February and again in , both demonstrating gallstones without evidence of cholecystitis or biliary obstruction.         The following portions of the patient's history were reviewed and updated as appropriate: allergies, current medications, past family history, past medical history, past social history, past surgical history, and problem list.    Patient Active Problem List   Diagnosis    Severe episode of recurrent major depressive disorder, with psychotic features    Oppositional defiant disorder    MDD (major depressive disorder), severe    Cannabis abuse, continuous    Lower abdominal pain    Morbidly obese    PTSD (post-traumatic stress disorder)    Exposure to lead    Calculus of gallbladder without cholecystitis without obstruction       Past Medical History:   Diagnosis Date    Anxiety     Anxiety     Chlamydia     Depression     Endometriosis     Gonorrhea     Kidney stone     Major depression     Migraine     PMS (premenstrual syndrome)     PTSD (post-traumatic stress disorder)     Seizures     Self-injurious behavior     hx of cutting starting at age 15    Suicide attempt     3/25/19-overdose attempt, 2017 overdose attempt    Urinary tract infection        Past Surgical History:   Procedure Laterality Date    ESOPHAGEAL DILATATION      TONSILLECTOMY    "      Medications:     Current Outpatient Medications:     calcium gluconate 500 MG tablet, Take 1 tablet by mouth 3 (Three) Times a Day Before Meals., Disp: 90 tablet, Rfl: 11    famotidine (PEPCID) 20 MG tablet, Take 1 tablet by mouth 2 (Two) Times a Day., Disp: 60 tablet, Rfl: 5    ferrous sulfate 324 (65 Fe) MG tablet delayed-release EC tablet, Take 1 tablet by mouth 2 (Two) Times a Day With Meals., Disp: 60 tablet, Rfl: 6    ondansetron ODT (ZOFRAN-ODT) 8 MG disintegrating tablet, Place 1 tablet on the tongue Every 8 (Eight) Hours As Needed for Nausea or Vomiting., Disp: 30 tablet, Rfl: 0    prenatal vitamin (prenatal, CLASSIC, vitamin) tablet, Take  by mouth Daily., Disp: , Rfl:     promethazine (PHENERGAN) 25 MG tablet, Take 1 tablet by mouth Every 6 (Six) Hours As Needed for Nausea or Vomiting., Disp: 30 tablet, Rfl: 0    vitamin B-6 (PYRIDOXINE) 25 MG tablet, Take 1 tablet by mouth Daily., Disp: 30 tablet, Rfl: 5    cephalexin (Keflex) 500 MG capsule, Take 1 capsule by mouth 4 (Four) Times a Day for 7 days., Disp: 28 capsule, Rfl: 0    Allergies:   Allergies   Allergen Reactions    Iodine Hives    Latex Hives         Family History   Problem Relation Age of Onset    Rheum arthritis Mother     Depression Mother     Drug abuse Mother     Heart murmur Father     Drug abuse Father     Alcohol abuse Father        Social History     Socioeconomic History    Marital status: Single   Tobacco Use    Smoking status: Former     Types: Electronic Cigarette    Smokeless tobacco: Never    Tobacco comments:     vapes   Vaping Use    Vaping Use: Every day   Substance and Sexual Activity    Alcohol use: Not Currently     Comment: \"I barely ever drink alcohol.\"    Drug use: Not Currently     Types: Marijuana     Comment: once monthly    Sexual activity: Yes     Partners: Male     Birth control/protection: None       Review of Systems   Constitutional:  Negative for diaphoresis, unexpected weight gain and unexpected weight " "loss.   HENT:  Negative for hearing loss, trouble swallowing and voice change.    Eyes:  Negative for visual disturbance.   Respiratory:  Negative for apnea, cough, chest tightness, shortness of breath and wheezing.    Cardiovascular:  Negative for chest pain, palpitations and leg swelling.   Gastrointestinal:  Positive for abdominal pain and nausea. Negative for abdominal distention, anal bleeding, blood in stool, constipation, diarrhea, rectal pain, vomiting, GERD and indigestion.   Endocrine: Negative for cold intolerance and heat intolerance.   Genitourinary:  Negative for difficulty urinating, dysuria and flank pain.   Musculoskeletal:  Negative for back pain and gait problem.   Skin:  Negative for color change, rash, skin lesions and wound.   Neurological:  Negative for dizziness, syncope, speech difficulty, weakness, light-headedness and numbness.   Hematological:  Negative for adenopathy. Does not bruise/bleed easily.   Psychiatric/Behavioral:  The patient is not nervous/anxious.    I have reviewed and confirmed the accuracy of the ROS as documented by the MA/LPN/RN Noemy Tsang MD        Objective   /86   Pulse 87   Ht 160 cm (63\")   Wt 108 kg (237 lb 9.6 oz)   LMP 12/27/2022   SpO2 99%   BMI 42.09 kg/mý     Physical Exam  Constitutional:       Appearance: She is well-developed.   HENT:      Head: Normocephalic and atraumatic.   Eyes:      General: No scleral icterus.  Cardiovascular:      Rate and Rhythm: Regular rhythm.   Pulmonary:      Effort: Pulmonary effort is normal.   Abdominal:      General: There is no distension.      Palpations: Abdomen is soft.      Tenderness: There is no abdominal tenderness.   Skin:     General: Skin is warm and dry.   Neurological:      Mental Status: She is alert and oriented to person, place, and time.   Psychiatric:         Behavior: Behavior normal.       Results Review:  I have personally reviewed the relevant patient imaging.     FINAL REPORT -US " GALLBLADDER      TECHNIQUE:  null     CLINICAL HISTORY:  RUQ pain in pregnancy     COMPARISON:  null     FINDINGS:  Exam: Limited abdominal ultrasound:     Comparison: None     Findings:     Unremarkable liver.     Gallbladder contains calculi in the neck and fundus. No gallbladder wall thickening or pericholecystic fluid.     4.2 mm CBD.     Unremarkable right kidney.     No ascites.     IMPRESSION:  Impression:     Cholelithiasis. No sonographic evidence of acute cholecystitis.     Authenticated and Electronically Signed by Perez Hernandez MD on  02/17/2023 01:28:59 AM      Narrative & Impression   PROCEDURE: US GALLBLADDER-     HISTORY: Abd pain, previous US with gallstones noted, 26 weeks pregnant     PROCEDURE: Ultrasound images of the right upper quadrant were obtained.     FINDINGS:  The liver parenchyma is normal in echogenicity. The liver  measures at the upper limits of normal for size. The gallbladder is well  visualized and the wall appears normal. There are gallstones. There is  no acute cholecystitis.  The common duct is normal measuring 5.30 mm.      IMPRESSION:  Cholelithiasis without acute cholecystitis.           Images were reviewed, interpreted, and dictated by DESTINEE Moser  Transcribed by Jaleesa Trivedi PA-C.     This report was signed and finalized on 6/28/2023 7:41 PM by DESTINEE Louise.       Assessment & Plan   Diagnoses and all orders for this visit:    1. Calculus of gallbladder without cholecystitis without obstruction (Primary)  -     Case Request; Standing  -     sodium chloride 0.9 % flush 10 mL  -     sodium chloride 0.9 % flush 10 mL  -     sodium chloride 0.9 % infusion 40 mL  -     lactated ringers infusion  -     ampicillin-sulbactam (UNASYN) 3 g in sodium chloride 0.9 % 100 mL IVPB  -     heparin (porcine) 5000 UNIT/ML injection 5,000 Units  -     Case Request    Other orders  -     Follow Anesthesia Guidelines / Protocol; Future  -     Follow Anesthesia  Guidelines / Protocol; Standing  -     Verify NPO Status; Standing  -     Clip Operative Site; Standing  -     Verify / Perform Chlorhexidine Skin Prep; Standing  -     Verify / Perform Chlorhexidine Skin Prep if Indicated (If Not Already Completed); Standing  -     Obtain Informed Consent; Future  -     Provide NPO Instructions to Patient; Future  -     Chlorhexidine Skin Prep; Future  -     Insert Peripheral IV; Standing  -     Saline Lock & Maintain IV Access; Standing      I had a detailed discussion with the patient regarding the management of recurrent biliary colic in pregnant patients.  Certainly, Ms. Vega will benefit from laparoscopic cholecystectomy in the long term for management of her gallbladder disease.  However, she is currently early in her 3rd trimester of pregnancy, and at this point, it is ideal to wait for her to complete her pregnancy before surgical intervention is undertaken.  I discussed with the patient that the current position of the uterine fundus makes laparoscopy nearly impossible in terms of working space.  We also discussed the risk of premature labor with surgical interventions during pregnancy.  Although an open cholecystectomy could certainly be done, the recovering would be significantly longer and more difficult that the standard laparoscopic procedure.  She does seem to be managing her symptoms with dietary modifications and is willing to wait until after delivery of her infant before proceeding.      I recommended to the patient that we proceed with laparoscopic cholecystectomy for definitive management of symptoms related to underlying gallbladder disease once she has completed her pregnancy.  We discussed the laparoscopic cholecystectomy procedure in detail along with the risks, benefits, and alternatives.  We specifically discussed the risks of bleeding, infection, conversion to an open procedure, postoperative bile leak, common bile duct injury, the need for ERCP (in the  setting of bile leak, choledocholithiasis, etc.), and the risks related to anesthesia.  The patient understands these, and is willing to proceed.  My office will facilitate scheduling (tentatively 10/16/23), and preadmission testing.  The patient understands the need to be n.p.o. after midnight the evening prior to scheduled surgical procedure.

## 2023-07-24 ENCOUNTER — ROUTINE PRENATAL (OUTPATIENT)
Dept: OBSTETRICS AND GYNECOLOGY | Facility: CLINIC | Age: 23
End: 2023-07-24
Payer: COMMERCIAL

## 2023-07-24 VITALS — DIASTOLIC BLOOD PRESSURE: 72 MMHG | SYSTOLIC BLOOD PRESSURE: 116 MMHG | BODY MASS INDEX: 43.35 KG/M2 | WEIGHT: 237 LBS

## 2023-07-24 DIAGNOSIS — F41.8 ANXIETY WITH DEPRESSION: ICD-10-CM

## 2023-07-24 DIAGNOSIS — K21.9 GASTROESOPHAGEAL REFLUX DISEASE WITHOUT ESOPHAGITIS: ICD-10-CM

## 2023-07-24 DIAGNOSIS — D50.9 IRON DEFICIENCY ANEMIA DURING PREGNANCY: ICD-10-CM

## 2023-07-24 DIAGNOSIS — O99.019 IRON DEFICIENCY ANEMIA DURING PREGNANCY: ICD-10-CM

## 2023-07-24 DIAGNOSIS — O09.93 ENCOUNTER FOR SUPERVISION OF HIGH RISK PREGNANCY IN THIRD TRIMESTER, ANTEPARTUM: Primary | ICD-10-CM

## 2023-07-24 DIAGNOSIS — F43.10 PTSD (POST-TRAUMATIC STRESS DISORDER): ICD-10-CM

## 2023-07-24 DIAGNOSIS — O26.613 CHOLELITHIASIS AFFECTING PREGNANCY IN THIRD TRIMESTER, ANTEPARTUM: ICD-10-CM

## 2023-07-24 DIAGNOSIS — O21.9 NAUSEA/VOMITING IN PREGNANCY: ICD-10-CM

## 2023-07-24 DIAGNOSIS — K80.20 CHOLELITHIASIS AFFECTING PREGNANCY IN THIRD TRIMESTER, ANTEPARTUM: ICD-10-CM

## 2023-07-24 PROCEDURE — 99214 OFFICE O/P EST MOD 30 MIN: CPT | Performed by: OBSTETRICS & GYNECOLOGY

## 2023-07-24 NOTE — PROGRESS NOTES
Chief Complaint  Routine Prenatal Visit    History of Present Illness:  Carol is a  currently at 29w6d who presents today with no significant complaints.  Patient has an appointment tomorrow with Dr. Tsang.  She has continued to have intermittent episodes of upper abdominal and epigastric pain.  Her pain however has not is been as severe in nature.  She has continued on her current medications.  She is taking her prenatal vitamins as well as iron supplements.  She is no longer on her Prozac.    Exam:  Vitals:  See prenatal flowsheet as noted and reviewed  General: Alert, cooperative, and does not appear in any distress  Abdomen:   See prenatal flowsheet as noted and reviewed    Uterus gravid, non-tender; no palpable masses    No guarding or rebound tenderness  Pelvic:  See prenatal flowsheet as noted and reviewed  Ext:  See prenatal flowsheet as noted and reviewed    Moves extremities well, no cyanosis and no redness  Urine:  See prenatal flowsheet as noted and reviewed    Data Review:  The following data was reviewed by: Eve Ricci MD on 2023:  Prenatal Labs:  Lab Results   Component Value Date    HGB 9.5 (L) 2023    RUBELLAABIGG 1.78 2023    HEPBSAG Non-Reactive 2023    HEPBSAG Non-Reactive 2023    ABO O 2023    RH Positive 2023    ABSCRN Negative 2023    [id number] Non-Reactive 2023    HEPCVIRUSABY Non-Reactive 2023    HEPCVIRUSABY Non-Reactive 2023    URINECX Final report (A) 2021       Admission on 07/10/2023, Discharged on 07/10/2023   Component Date Value    THC, Screen, Urine 07/10/2023 Negative     Phencyclidine (PCP), Uri* 07/10/2023 Negative     Cocaine Screen, Urine 07/10/2023 Negative     Methamphetamine, Ur 07/10/2023 Negative     Opiate Screen 07/10/2023 Negative     Amphetamine Screen, Urine 07/10/2023 Negative     Benzodiazepine Screen, U* 07/10/2023 Negative     Tricyclic Antidepressant* 07/10/2023 Negative     Methadone  Screen, Urine 07/10/2023 Negative     Barbiturates Screen, Uri* 07/10/2023 Negative     Oxycodone Screen, Urine 07/10/2023 Negative     Propoxyphene Screen 07/10/2023 Negative     Buprenorphine, Screen, U* 07/10/2023 Negative    Admission on 07/05/2023, Discharged on 07/05/2023   Component Date Value    Color 07/05/2023 Yellow     Clarity, UA 07/05/2023 Clear     Glucose, UA 07/05/2023 Negative     Bilirubin 07/05/2023 Negative     Ketones, UA 07/05/2023 Negative     Specific Gravity  07/05/2023 1.025     Blood, UA 07/05/2023 Negative     pH, Urine 07/05/2023 6.0     Protein, POC 07/05/2023 Negative     Urobilinogen, UA 07/05/2023 Normal     Leukocytes 07/05/2023 Negative     Nitrite, UA 07/05/2023 Negative     Glucose 07/05/2023 106 (H)     BUN 07/05/2023 8     Creatinine 07/05/2023 0.44 (L)     Sodium 07/05/2023 137     Potassium 07/05/2023 3.9     Chloride 07/05/2023 104     CO2 07/05/2023 19.5 (L)     Calcium 07/05/2023 8.9     Total Protein 07/05/2023 5.8 (L)     Albumin 07/05/2023 3.1 (L)     ALT (SGPT) 07/05/2023 6     AST (SGOT) 07/05/2023 7     Alkaline Phosphatase 07/05/2023 77     Total Bilirubin 07/05/2023 0.3     Globulin 07/05/2023 2.7     A/G Ratio 07/05/2023 1.1     BUN/Creatinine Ratio 07/05/2023 18.2     Anion Gap 07/05/2023 13.5     eGFR 07/05/2023 142.2     Lipase 07/05/2023 21     Amylase 07/05/2023 40     WBC 07/05/2023 12.63 (H)     RBC 07/05/2023 3.32 (L)     Hemoglobin 07/05/2023 9.5 (L)     Hematocrit 07/05/2023 28.3 (L)     MCV 07/05/2023 85.2     MCH 07/05/2023 28.6     MCHC 07/05/2023 33.6     RDW 07/05/2023 13.3     RDW-SD 07/05/2023 41.3     MPV 07/05/2023 10.0     Platelets 07/05/2023 264     Neutrophil % 07/05/2023 71.2     Lymphocyte % 07/05/2023 20.0     Monocyte % 07/05/2023 6.5     Eosinophil % 07/05/2023 1.1     Basophil % 07/05/2023 0.3     Immature Grans % 07/05/2023 0.9 (H)     Neutrophils, Absolute 07/05/2023 8.99 (H)     Lymphocytes, Absolute 07/05/2023 2.53     Monocytes,  Absolute 07/05/2023 0.82     Eosinophils, Absolute 07/05/2023 0.14     Basophils, Absolute 07/05/2023 0.04     Immature Grans, Absolute 07/05/2023 0.11 (H)     nRBC 07/05/2023 0.0    Routine Prenatal on 07/03/2023   Component Date Value    Gestational Diabetes Scr* 07/03/2023 119     WBC 07/03/2023 11.04 (H)     RBC 07/03/2023 3.55 (L)     Hemoglobin 07/03/2023 10.1 (L)     Hematocrit 07/03/2023 29.8 (L)     MCV 07/03/2023 83.9     MCH 07/03/2023 28.5     MCHC 07/03/2023 33.9     RDW 07/03/2023 13.5     Platelets 07/03/2023 291     Neutrophil Rel % 07/03/2023 62.7     Lymphocyte Rel % 07/03/2023 26.3     Monocyte Rel % 07/03/2023 7.3     Eosinophil Rel % 07/03/2023 1.8     Basophil Rel % 07/03/2023 0.2     Neutrophils Absolute 07/03/2023 6.92     Lymphocytes Absolute 07/03/2023 2.90     Monocytes Absolute 07/03/2023 0.81     Eosinophils Absolute 07/03/2023 0.20     Basophils Absolute 07/03/2023 0.02     Immature Granulocyte Rel* 07/03/2023 1.7 (H)     Immature Grans Absolute 07/03/2023 0.19 (H)     nRBC 07/03/2023 0.0    Admission on 06/27/2023, Discharged on 06/28/2023   Component Date Value    Glucose 06/27/2023 96     BUN 06/27/2023 6     Creatinine 06/27/2023 0.46 (L)     Sodium 06/27/2023 135 (L)     Potassium 06/27/2023 3.5     Chloride 06/27/2023 101     CO2 06/27/2023 21.7 (L)     Calcium 06/27/2023 9.0     Total Protein 06/27/2023 6.4     Albumin 06/27/2023 3.3 (L)     ALT (SGPT) 06/27/2023 6     AST (SGOT) 06/27/2023 8     Alkaline Phosphatase 06/27/2023 86     Total Bilirubin 06/27/2023 0.4     Globulin 06/27/2023 3.1     A/G Ratio 06/27/2023 1.1     BUN/Creatinine Ratio 06/27/2023 13.0     Anion Gap 06/27/2023 12.3     eGFR 06/27/2023 140.7     WBC 06/27/2023 11.78 (H)     RBC 06/27/2023 3.47 (L)     Hemoglobin 06/27/2023 10.0 (L)     Hematocrit 06/27/2023 29.3 (L)     MCV 06/27/2023 84.4     MCH 06/27/2023 28.8     MCHC 06/27/2023 34.1     RDW 06/27/2023 13.4     RDW-SD 06/27/2023 41.1     MPV  2023 9.5     Platelets 2023 252     Neutrophil % 2023 71.7     Lymphocyte % 2023 18.9 (L)     Monocyte % 2023 6.6     Eosinophil % 2023 1.0     Basophil % 2023 0.3     Immature Grans % 2023 1.5 (H)     Neutrophils, Absolute 2023 8.44 (H)     Lymphocytes, Absolute 2023 2.23     Monocytes, Absolute 2023 0.78     Eosinophils, Absolute 2023 0.12     Basophils, Absolute 2023 0.03     Immature Grans, Absolute 2023 0.18 (H)     nRBC 2023 0.0      Imaging:  US Gallbladder  Narrative: PROCEDURE: US GALLBLADDER-     HISTORY: Abd pain, previous US with gallstones noted, 26 weeks pregnant     PROCEDURE: Ultrasound images of the right upper quadrant were obtained.     FINDINGS:  The liver parenchyma is normal in echogenicity. The liver  measures at the upper limits of normal for size. The gallbladder is well  visualized and the wall appears normal. There are gallstones. There is  no acute cholecystitis.  The common duct is normal measuring 5.30 mm.      Impression: Cholelithiasis without acute cholecystitis.           Images were reviewed, interpreted, and dictated by DESTINEE Moser  Transcribed by Jaleesa Trivedi PA-C.     This report was signed and finalized on 2023 7:41 PM by DESTINEE Louise.     Medical Records:  None    Assessment and Plan:  Problem List Items Addressed This Visit          Other    PTSD (post-traumatic stress disorder)     Other Visit Diagnoses       Encounter for supervision of high risk pregnancy in third trimester, antepartum    -  Primary  Topics discussed:     GERD management  iron supplementation  kick counts and fetal movement  PIH precautions   labor signs and symptoms  Scan today for growth as noted.  Patient is informed regarding those findings.  Repeat scan in 4 weeks.    Nausea/vomiting in pregnancy      Patient is to continue her Phenergan and Zofran as previously given.  She  is to keep her appointment with Dr. Tsang tomorrow as discussed.    Anxiety with depression      Patient desires to stay off of her Prozac at present.  Instructions and precautions are given.  She is to call for worsening and/or changes in her symptoms.    Cholelithiasis affecting pregnancy in third trimester, antepartum          Iron deficiency anemia during pregnancy      Patient is to continue her iron supplements as previously given.    Gastroesophageal reflux disease without esophagitis      Patient is to continue her Pepcid as previously given.          Follow Up/Instructions:  Follow up as scheduled.  Patient was given instructions and counseling regarding her condition or for health maintenance advice. Please see specific information pulled into the AVS if appropriate.     Note: Speech recognition transcription software may have been used to dictate portions of this document.  An attempt at proofreading has been made though minor errors in transcription may still be present.    This note was electronically signed.  Eve Ricci M.D.

## 2023-07-25 ENCOUNTER — OFFICE VISIT (OUTPATIENT)
Dept: SURGERY | Facility: CLINIC | Age: 23
End: 2023-07-25
Payer: COMMERCIAL

## 2023-07-25 VITALS
HEIGHT: 63 IN | SYSTOLIC BLOOD PRESSURE: 134 MMHG | WEIGHT: 237.6 LBS | BODY MASS INDEX: 42.1 KG/M2 | DIASTOLIC BLOOD PRESSURE: 86 MMHG | HEART RATE: 87 BPM | OXYGEN SATURATION: 99 %

## 2023-07-25 DIAGNOSIS — K80.20 CALCULUS OF GALLBLADDER WITHOUT CHOLECYSTITIS WITHOUT OBSTRUCTION: Primary | ICD-10-CM

## 2023-07-25 PROCEDURE — 1159F MED LIST DOCD IN RCRD: CPT | Performed by: SURGERY

## 2023-07-25 PROCEDURE — 1160F RVW MEDS BY RX/DR IN RCRD: CPT | Performed by: SURGERY

## 2023-07-25 PROCEDURE — 99204 OFFICE O/P NEW MOD 45 MIN: CPT | Performed by: SURGERY

## 2023-07-25 RX ORDER — SODIUM CHLORIDE 0.9 % (FLUSH) 0.9 %
10 SYRINGE (ML) INJECTION EVERY 12 HOURS SCHEDULED
OUTPATIENT
Start: 2023-07-25

## 2023-07-25 RX ORDER — SODIUM CHLORIDE, SODIUM LACTATE, POTASSIUM CHLORIDE, CALCIUM CHLORIDE 600; 310; 30; 20 MG/100ML; MG/100ML; MG/100ML; MG/100ML
50 INJECTION, SOLUTION INTRAVENOUS CONTINUOUS
OUTPATIENT
Start: 2023-07-25

## 2023-07-25 RX ORDER — SODIUM CHLORIDE 0.9 % (FLUSH) 0.9 %
10 SYRINGE (ML) INJECTION AS NEEDED
OUTPATIENT
Start: 2023-07-25

## 2023-07-25 RX ORDER — SODIUM CHLORIDE 9 MG/ML
40 INJECTION, SOLUTION INTRAVENOUS AS NEEDED
OUTPATIENT
Start: 2023-07-25

## 2023-07-25 RX ORDER — HEPARIN SODIUM 5000 [USP'U]/ML
5000 INJECTION, SOLUTION INTRAVENOUS; SUBCUTANEOUS ONCE
OUTPATIENT
Start: 2023-07-25 | End: 2023-07-25

## 2023-07-31 ENCOUNTER — ROUTINE PRENATAL (OUTPATIENT)
Dept: OBSTETRICS AND GYNECOLOGY | Facility: CLINIC | Age: 23
End: 2023-07-31
Payer: COMMERCIAL

## 2023-07-31 VITALS — SYSTOLIC BLOOD PRESSURE: 126 MMHG | BODY MASS INDEX: 42.69 KG/M2 | DIASTOLIC BLOOD PRESSURE: 84 MMHG | WEIGHT: 241 LBS

## 2023-07-31 DIAGNOSIS — R30.0 DYSURIA: ICD-10-CM

## 2023-07-31 DIAGNOSIS — Z34.93 PRENATAL CARE IN THIRD TRIMESTER: Primary | ICD-10-CM

## 2023-08-02 DIAGNOSIS — N30.00 ACUTE CYSTITIS WITHOUT HEMATURIA: Primary | ICD-10-CM

## 2023-08-02 LAB
APPEARANCE UR: ABNORMAL
BACTERIA #/AREA URNS HPF: ABNORMAL /HPF
BACTERIA UR CULT: NORMAL
BACTERIA UR CULT: NORMAL
BILIRUB UR QL STRIP: NEGATIVE
CASTS URNS QL MICRO: ABNORMAL /LPF
COLOR UR: YELLOW
CRYSTALS URNS MICRO: ABNORMAL
EPI CELLS #/AREA URNS HPF: >10 /HPF (ref 0–10)
GLUCOSE UR QL STRIP: NEGATIVE
HGB UR QL STRIP: NEGATIVE
KETONES UR QL STRIP: NEGATIVE
LEUKOCYTE ESTERASE UR QL STRIP: NEGATIVE
MICRO URNS: ABNORMAL
NITRITE UR QL STRIP: POSITIVE
PH UR STRIP: 6.5 [PH] (ref 5–7.5)
PROT UR QL STRIP: ABNORMAL
RBC #/AREA URNS HPF: ABNORMAL /HPF (ref 0–2)
SP GR UR STRIP: 1.02 (ref 1–1.03)
UNIDENT CRYS URNS QL MICRO: PRESENT /LPF
URINALYSIS REFLEX: ABNORMAL
UROBILINOGEN UR STRIP-MCNC: 1 MG/DL (ref 0.2–1)
WBC #/AREA URNS HPF: ABNORMAL /HPF (ref 0–5)

## 2023-08-02 RX ORDER — CEPHALEXIN 500 MG/1
500 CAPSULE ORAL 4 TIMES DAILY
Qty: 28 CAPSULE | Refills: 0 | Status: SHIPPED | OUTPATIENT
Start: 2023-08-02 | End: 2023-08-09

## 2023-08-14 ENCOUNTER — ROUTINE PRENATAL (OUTPATIENT)
Dept: OBSTETRICS AND GYNECOLOGY | Facility: CLINIC | Age: 23
End: 2023-08-14
Payer: COMMERCIAL

## 2023-08-14 VITALS — SYSTOLIC BLOOD PRESSURE: 112 MMHG | BODY MASS INDEX: 43.22 KG/M2 | WEIGHT: 244 LBS | DIASTOLIC BLOOD PRESSURE: 68 MMHG

## 2023-08-14 DIAGNOSIS — F41.8 ANXIETY WITH DEPRESSION: ICD-10-CM

## 2023-08-14 DIAGNOSIS — K80.20 CHOLELITHIASIS AFFECTING PREGNANCY IN THIRD TRIMESTER, ANTEPARTUM: ICD-10-CM

## 2023-08-14 DIAGNOSIS — O26.613 CHOLELITHIASIS AFFECTING PREGNANCY IN THIRD TRIMESTER, ANTEPARTUM: ICD-10-CM

## 2023-08-14 DIAGNOSIS — Z34.93 PRENATAL CARE IN THIRD TRIMESTER: Primary | ICD-10-CM

## 2023-08-14 DIAGNOSIS — O99.019 MATERNAL ANEMIA IN PREGNANCY, ANTEPARTUM: ICD-10-CM

## 2023-08-14 PROCEDURE — 99213 OFFICE O/P EST LOW 20 MIN: CPT | Performed by: STUDENT IN AN ORGANIZED HEALTH CARE EDUCATION/TRAINING PROGRAM

## 2023-08-14 NOTE — PROGRESS NOTES
Prenatal Care Visit    Subjective   Chief Complaint   Patient presents with    Routine Prenatal Visit     History:   Carol is a  currently at 32w6d who presents for a prenatal care visit today.    Reports [place]. Denies VB, LOF. Reports (+) FM.     Objective   /68   Wt 111 kg (244 lb)   LMP 2022   BMI 43.22 kg/mý   Physical Exam:  Normal, gestational age-appropriate exam today      Assessment & Plan     IUP @ 32w6d  Routine care: I have reviewed the prenatal labs and ultrasound(s) today. I have reviewed the most recent prenatal progress note(s). Growth US today - EFW 2319g (75%), AC 84%, NICOLAS 17.7 cm, vertex.  Anxiety, depression, PTSD: stable, not on medications  Cholelithiasis: stable, s/p consultation with General Surgery. Plan is for [facility] nicolette postpartum.  Anemia: continue iron supplement BID     Diagnosis Plan   1. Prenatal care in third trimester        2. Anxiety with depression        3. Cholelithiasis affecting pregnancy in third trimester, antepartum        4. Maternal anemia in pregnancy, antepartum          Medication Management: Continue PNV, iron supplement    Topics discussed: Prenatal care milestones  Iron supplementation  Kick counts and fetal movement   labor signs and symptoms   Tests next visit: none   Next visit: 2 week(s)     Tenisha Lane MD  Obstetrics and Gynecology  [facility]

## 2023-08-28 ENCOUNTER — ROUTINE PRENATAL (OUTPATIENT)
Dept: OBSTETRICS AND GYNECOLOGY | Facility: CLINIC | Age: 23
End: 2023-08-28
Payer: COMMERCIAL

## 2023-08-28 VITALS — BODY MASS INDEX: 44.11 KG/M2 | SYSTOLIC BLOOD PRESSURE: 110 MMHG | WEIGHT: 249 LBS | DIASTOLIC BLOOD PRESSURE: 66 MMHG

## 2023-08-28 DIAGNOSIS — O26.613 CHOLELITHIASIS AFFECTING PREGNANCY IN THIRD TRIMESTER, ANTEPARTUM: ICD-10-CM

## 2023-08-28 DIAGNOSIS — Z34.93 PRENATAL CARE IN THIRD TRIMESTER: Primary | ICD-10-CM

## 2023-08-28 DIAGNOSIS — K80.20 CHOLELITHIASIS AFFECTING PREGNANCY IN THIRD TRIMESTER, ANTEPARTUM: ICD-10-CM

## 2023-08-28 DIAGNOSIS — F41.8 ANXIETY WITH DEPRESSION: ICD-10-CM

## 2023-08-28 DIAGNOSIS — O99.019 MATERNAL ANEMIA IN PREGNANCY, ANTEPARTUM: ICD-10-CM

## 2023-08-28 NOTE — PROGRESS NOTES
Prenatal Care Visit    Subjective   Chief Complaint   Patient presents with    Routine Prenatal Visit     History:   Carol is a  currently at 34w6d who presents for a prenatal care visit today.    Denies regular CTX, VB, LOF. Reports (+) FM. Has been experiencing sciatic pain on her left side.      Objective   /66   Wt 113 kg (249 lb)   LMP 2022   BMI 44.11 kg/mý   Physical Exam:  Normal, gestational age-appropriate exam today      Assessment & Plan     IUP @ 34w6d  Routine care: I have reviewed the prenatal labs and ultrasound(s) today. I have reviewed the most recent prenatal progress note(s).   Anxiety, depression, PTSD: stable, not on medications  Cholelithiasis: stable, s/p consultation w/ General Surgery. Plan is for [facility] nicolette postpartum, has f/u scheduled 10/24.  Anemia: continue iron supplement BID     Diagnosis Plan   1. Prenatal care in third trimester        2. Anxiety with depression        3. Cholelithiasis affecting pregnancy in third trimester, antepartum        4. Maternal anemia in pregnancy, antepartum           Medication Management: continue iron, PNV    Topics discussed: Prenatal care milestones  Iron supplementation  Kick counts and fetal movement   labor signs and symptoms   Tests next visit: GBS testing   Next visit: 2 week(s)     Tenisha Lane MD  Obstetrics and Gynecology  [facility]

## 2023-09-06 ENCOUNTER — ROUTINE PRENATAL (OUTPATIENT)
Dept: OBSTETRICS AND GYNECOLOGY | Facility: CLINIC | Age: 23
End: 2023-09-06
Payer: COMMERCIAL

## 2023-09-06 VITALS — BODY MASS INDEX: 44.78 KG/M2 | WEIGHT: 252.8 LBS | DIASTOLIC BLOOD PRESSURE: 78 MMHG | SYSTOLIC BLOOD PRESSURE: 120 MMHG

## 2023-09-06 DIAGNOSIS — Z36.85 ENCOUNTER FOR ANTENATAL SCREENING FOR STREPTOCOCCUS B: Primary | ICD-10-CM

## 2023-09-06 DIAGNOSIS — Z34.93 THIRD TRIMESTER PREGNANCY: ICD-10-CM

## 2023-09-06 NOTE — PROGRESS NOTES
Chief Complaint   Patient presents with    Routine Prenatal Visit     Prenatal visit with GBS done today. No problems or concerns        HPI:   , 36w1d gestation reports doing well    ROS:  See Prenatal Episode/Flowsheet  /78   Wt 115 kg (252 lb 12.8 oz)   LMP 2022   BMI 44.78 kg/m²      EXAM:  EXTREMITIES:  No swelling-See Prenatal Episode/Flowsheet    ABDOMEN:  FHTs/Movement noted-See Prenatal Episode/Flowsheet    URINE GLUCOSE/PROTEIN:  See Prenatal Episode/Flowsheet    PELVIC EXAM:  See Prenatal Episode/Flowsheet  CV:  Lungs:  GYN:    MDM:    Lab Results   Component Value Date    HGB 9.5 (L) 2023    RUBELLAABIGG 1.78 2023    HEPBSAG Non-Reactive 2023    HEPBSAG Non-Reactive 2023    ABO O 2023    RH Positive 2023    ABSCRN Negative 2023    [id number] Non-Reactive 2023    HEPCVIRUSABY Non-Reactive 2023    HEPCVIRUSABY Non-Reactive 2023    URINECX Final report 2023       U/S:    1. IUP 36w1d  2. Routine care   3. Anemia: cbc today  4. Growth one week

## 2023-09-07 LAB
BASOPHILS # BLD AUTO: 0.02 10*3/MM3 (ref 0–0.2)
BASOPHILS NFR BLD AUTO: 0.2 % (ref 0–1.5)
EOSINOPHIL # BLD AUTO: 0.14 10*3/MM3 (ref 0–0.4)
EOSINOPHIL NFR BLD AUTO: 1.2 % (ref 0.3–6.2)
ERYTHROCYTE [DISTWIDTH] IN BLOOD BY AUTOMATED COUNT: 15 % (ref 12.3–15.4)
HCT VFR BLD AUTO: 31 % (ref 34–46.6)
HGB BLD-MCNC: 10.2 G/DL (ref 12–15.9)
IMM GRANULOCYTES # BLD AUTO: 0.08 10*3/MM3 (ref 0–0.05)
IMM GRANULOCYTES NFR BLD AUTO: 0.7 % (ref 0–0.5)
LYMPHOCYTES # BLD AUTO: 2.26 10*3/MM3 (ref 0.7–3.1)
LYMPHOCYTES NFR BLD AUTO: 19.3 % (ref 19.6–45.3)
MCH RBC QN AUTO: 26.1 PG (ref 26.6–33)
MCHC RBC AUTO-ENTMCNC: 32.9 G/DL (ref 31.5–35.7)
MCV RBC AUTO: 79.3 FL (ref 79–97)
MONOCYTES # BLD AUTO: 0.79 10*3/MM3 (ref 0.1–0.9)
MONOCYTES NFR BLD AUTO: 6.8 % (ref 5–12)
NEUTROPHILS # BLD AUTO: 8.41 10*3/MM3 (ref 1.7–7)
NEUTROPHILS NFR BLD AUTO: 71.8 % (ref 42.7–76)
NRBC BLD AUTO-RTO: 0 /100 WBC (ref 0–0.2)
PLATELET # BLD AUTO: 263 10*3/MM3 (ref 140–450)
RBC # BLD AUTO: 3.91 10*6/MM3 (ref 3.77–5.28)
WBC # BLD AUTO: 11.7 10*3/MM3 (ref 3.4–10.8)

## 2023-09-08 LAB — GP B STREP DNA SPEC QL NAA+PROBE: NEGATIVE

## 2023-09-13 ENCOUNTER — HOSPITAL ENCOUNTER (OUTPATIENT)
Facility: HOSPITAL | Age: 23
Discharge: HOME | End: 2023-09-13
Attending: MIDWIFE | Admitting: MIDWIFE
Payer: COMMERCIAL

## 2023-09-13 ENCOUNTER — ROUTINE PRENATAL (OUTPATIENT)
Dept: OBSTETRICS AND GYNECOLOGY | Facility: CLINIC | Age: 23
End: 2023-09-13
Payer: COMMERCIAL

## 2023-09-13 VITALS — BODY MASS INDEX: 45.31 KG/M2 | WEIGHT: 255.8 LBS | DIASTOLIC BLOOD PRESSURE: 68 MMHG | SYSTOLIC BLOOD PRESSURE: 112 MMHG

## 2023-09-13 VITALS
OXYGEN SATURATION: 99 % | HEART RATE: 83 BPM | TEMPERATURE: 97.5 F | RESPIRATION RATE: 16 BRPM | SYSTOLIC BLOOD PRESSURE: 123 MMHG | DIASTOLIC BLOOD PRESSURE: 61 MMHG

## 2023-09-13 DIAGNOSIS — Z34.93 THIRD TRIMESTER PREGNANCY: Primary | ICD-10-CM

## 2023-09-13 LAB
A1 MICROGLOB PLACENTAL VAG QL: NEGATIVE
BILIRUB BLD-MCNC: NEGATIVE MG/DL
CLARITY, POC: CLEAR
COLOR UR: YELLOW
GLUCOSE UR STRIP-MCNC: NEGATIVE MG/DL
KETONES UR QL: NEGATIVE
LEUKOCYTE EST, POC: NEGATIVE
NITRITE UR-MCNC: NEGATIVE MG/ML
PH UR: 5 [PH] (ref 5–8)
PROT UR STRIP-MCNC: NEGATIVE MG/DL
RBC # UR STRIP: ABNORMAL /UL
SP GR UR: 1 (ref 1–1.03)
UROBILINOGEN UR QL: NORMAL

## 2023-09-13 PROCEDURE — 81002 URINALYSIS NONAUTO W/O SCOPE: CPT | Performed by: MIDWIFE

## 2023-09-13 PROCEDURE — 59025 FETAL NON-STRESS TEST: CPT | Performed by: MIDWIFE

## 2023-09-13 PROCEDURE — 84112 EVAL AMNIOTIC FLUID PROTEIN: CPT | Performed by: MIDWIFE

## 2023-09-13 PROCEDURE — 59025 FETAL NON-STRESS TEST: CPT

## 2023-09-13 PROCEDURE — G0463 HOSPITAL OUTPT CLINIC VISIT: HCPCS

## 2023-09-13 RX ORDER — SODIUM CHLORIDE 0.9 % (FLUSH) 0.9 %
10 SYRINGE (ML) INJECTION AS NEEDED
Status: DISCONTINUED | OUTPATIENT
Start: 2023-09-13 | End: 2023-09-13 | Stop reason: HOSPADM

## 2023-09-13 RX ORDER — SODIUM CHLORIDE 9 MG/ML
40 INJECTION, SOLUTION INTRAVENOUS AS NEEDED
Status: DISCONTINUED | OUTPATIENT
Start: 2023-09-13 | End: 2023-09-13 | Stop reason: HOSPADM

## 2023-09-13 RX ORDER — SODIUM CHLORIDE 0.9 % (FLUSH) 0.9 %
10 SYRINGE (ML) INJECTION EVERY 12 HOURS SCHEDULED
Status: DISCONTINUED | OUTPATIENT
Start: 2023-09-13 | End: 2023-09-13 | Stop reason: HOSPADM

## 2023-09-13 RX ORDER — LIDOCAINE HYDROCHLORIDE 10 MG/ML
0.5 INJECTION, SOLUTION INFILTRATION; PERINEURAL ONCE AS NEEDED
Status: DISCONTINUED | OUTPATIENT
Start: 2023-09-13 | End: 2023-09-13 | Stop reason: HOSPADM

## 2023-09-13 NOTE — NURSING NOTE
"Jennyfer HIDALGO notified of pt's arrival with c/o \"something came out\", pt had just had intercourse. No VB, no LOF noted with inspection, Amnisure done and was negative. VS WNL. No ctx's. FHR reactive, Cat 1. Order received for discharge home.   "

## 2023-09-13 NOTE — PROGRESS NOTES
Chief Complaint   Patient presents with    Routine Prenatal Visit     Prenatal visit with repeat Growth scan done today. No problems or concerns.        HPI:   , 37w1d gestation reports doing well    ROS:  See Prenatal Episode/Flowsheet  /68   Wt 116 kg (255 lb 12.8 oz)   LMP 2022   BMI 45.31 kg/m²      EXAM:  EXTREMITIES:  No swelling-See Prenatal Episode/Flowsheet    ABDOMEN:  FHTs/Movement noted-See Prenatal Episode/Flowsheet    URINE GLUCOSE/PROTEIN:  See Prenatal Episode/Flowsheet    PELVIC EXAM:  See Prenatal Episode/Flowsheet  CV:  Lungs:  GYN:    MDM:    Lab Results   Component Value Date    HGB 10.2 (L) 2023    RUBELLAABIGG 1.78 2023    HEPBSAG Non-Reactive 2023    HEPBSAG Non-Reactive 2023    ABO O 2023    RH Positive 2023    ABSCRN Negative 2023    [id number] Non-Reactive 2023    HEPCVIRUSABY Non-Reactive 2023    HEPCVIRUSABY Non-Reactive 2023    STREPGPB Negative 2023    URINECX Final report 2023       U/S:US Ob Follow Up Transabdominal Approach (2023 14:16)   Growth overall growth 81 percentile.  AC 96 percentile.  NICOLAS 13.2.  Vertex.  Posterior placenta  1. IUP 37w1d  2. Routine care   3.  Anemia: Patient taking her vitamins and iron.  CBC showed improvement of blood counts.  4.  Borderline LGA.  AC 96 percentile.

## 2023-09-13 NOTE — NON STRESS TEST
"Triage Note - Nursing Documentation  Labor and Delivery Admission Log    Carol Vega  : 2000  MRN: [id number]  CSN: [id number]    Date in / Time in:  2023  Time in: 105    Date out / Time out:    Time out: 155    Nurse: Nancy Mcneill RN    Patient Info: She is a 23 y.o. year old  at 37w1d with an MACI of 10/3/2023, by Last Menstrual Period who was seen on the [facility].    Chief Complaint:   Chief Complaint   Patient presents with    Leaking Fluid     \"I had intercourse and I felt a drop of clear liquid hit my ankle.  He didn't finish and then I was laying there for another hour and felt two more times some liquid coming out.  I called my mom and she thought I should come in.\"       Provider Instructions / Disposition: Amnisure negative. No LOF or VB. No ctxs. FHR reactive. Keep appt today. DC home.    Patient Active Problem List   Diagnosis    Severe episode of recurrent major depressive disorder, with psychotic features    Oppositional defiant disorder    MDD (major depressive disorder), severe    Cannabis abuse, continuous    Lower abdominal pain    Morbidly obese    PTSD (post-traumatic stress disorder)    Exposure to lead    Calculus of gallbladder without cholecystitis without obstruction       NST Documentation (Only applicable > 32 weeks): Interpretation A  Nonstress Test Interpretation A: Reactive (23 0202 : Nancy Mcneill, KORTNEY)    "

## 2023-09-18 ENCOUNTER — HOSPITAL ENCOUNTER (OUTPATIENT)
Facility: HOSPITAL | Age: 23
Discharge: HOME | End: 2023-09-18
Attending: OBSTETRICS & GYNECOLOGY | Admitting: OBSTETRICS & GYNECOLOGY
Payer: COMMERCIAL

## 2023-09-18 ENCOUNTER — ROUTINE PRENATAL (OUTPATIENT)
Dept: OBSTETRICS AND GYNECOLOGY | Facility: CLINIC | Age: 23
End: 2023-09-18
Payer: COMMERCIAL

## 2023-09-18 VITALS — SYSTOLIC BLOOD PRESSURE: 148 MMHG | DIASTOLIC BLOOD PRESSURE: 98 MMHG | BODY MASS INDEX: 45.7 KG/M2 | WEIGHT: 258 LBS

## 2023-09-18 VITALS
HEIGHT: 63 IN | SYSTOLIC BLOOD PRESSURE: 117 MMHG | RESPIRATION RATE: 16 BRPM | OXYGEN SATURATION: 100 % | BODY MASS INDEX: 45.59 KG/M2 | TEMPERATURE: 98.3 F | DIASTOLIC BLOOD PRESSURE: 74 MMHG | WEIGHT: 257.3 LBS | HEART RATE: 106 BPM

## 2023-09-18 DIAGNOSIS — Z34.93 PRENATAL CARE IN THIRD TRIMESTER: Primary | ICD-10-CM

## 2023-09-18 DIAGNOSIS — O36.8130 DECREASED FETAL MOVEMENTS IN THIRD TRIMESTER, SINGLE OR UNSPECIFIED FETUS: ICD-10-CM

## 2023-09-18 DIAGNOSIS — R03.0 ELEVATED BLOOD PRESSURE READING: ICD-10-CM

## 2023-09-18 LAB
ALBUMIN SERPL-MCNC: 3.2 G/DL (ref 3.5–5.2)
ALBUMIN/GLOB SERPL: 1.1 G/DL
ALP SERPL-CCNC: 128 U/L (ref 39–117)
ALT SERPL W P-5'-P-CCNC: 6 U/L (ref 1–33)
ANION GAP SERPL CALCULATED.3IONS-SCNC: 14 MMOL/L (ref 5–15)
AST SERPL-CCNC: 15 U/L (ref 1–32)
BASOPHILS # BLD AUTO: 0.03 10*3/MM3 (ref 0–0.2)
BASOPHILS NFR BLD AUTO: 0.2 % (ref 0–1.5)
BILIRUB BLD-MCNC: NEGATIVE MG/DL
BILIRUB SERPL-MCNC: 0.4 MG/DL (ref 0–1.2)
BUN SERPL-MCNC: 6 MG/DL (ref 6–20)
BUN/CREAT SERPL: 12 (ref 7–25)
CALCIUM SPEC-SCNC: 9.1 MG/DL (ref 8.6–10.5)
CHLORIDE SERPL-SCNC: 102 MMOL/L (ref 98–107)
CLARITY, POC: CLEAR
CO2 SERPL-SCNC: 19 MMOL/L (ref 22–29)
COLOR UR: YELLOW
CREAT SERPL-MCNC: 0.5 MG/DL (ref 0.57–1)
CREAT UR-MCNC: 127.4 MG/DL
DEPRECATED RDW RBC AUTO: 45.5 FL (ref 37–54)
EGFRCR SERPLBLD CKD-EPI 2021: 137 ML/MIN/1.73
EOSINOPHIL # BLD AUTO: 0.26 10*3/MM3 (ref 0–0.4)
EOSINOPHIL NFR BLD AUTO: 1.8 % (ref 0.3–6.2)
ERYTHROCYTE [DISTWIDTH] IN BLOOD BY AUTOMATED COUNT: 15.6 % (ref 12.3–15.4)
GLOBULIN UR ELPH-MCNC: 2.8 GM/DL
GLUCOSE SERPL-MCNC: 93 MG/DL (ref 65–99)
GLUCOSE UR STRIP-MCNC: NEGATIVE MG/DL
HCT VFR BLD AUTO: 32.1 % (ref 34–46.6)
HGB BLD-MCNC: 10.4 G/DL (ref 12–15.9)
IMM GRANULOCYTES # BLD AUTO: 0.13 10*3/MM3 (ref 0–0.05)
IMM GRANULOCYTES NFR BLD AUTO: 0.9 % (ref 0–0.5)
KETONES UR QL: NEGATIVE
LEUKOCYTE EST, POC: NEGATIVE
LYMPHOCYTES # BLD AUTO: 2.4 10*3/MM3 (ref 0.7–3.1)
LYMPHOCYTES NFR BLD AUTO: 16.4 % (ref 19.6–45.3)
MCH RBC QN AUTO: 26.3 PG (ref 26.6–33)
MCHC RBC AUTO-ENTMCNC: 32.4 G/DL (ref 31.5–35.7)
MCV RBC AUTO: 81.1 FL (ref 79–97)
MONOCYTES # BLD AUTO: 0.99 10*3/MM3 (ref 0.1–0.9)
MONOCYTES NFR BLD AUTO: 6.8 % (ref 5–12)
NEUTROPHILS NFR BLD AUTO: 10.84 10*3/MM3 (ref 1.7–7)
NEUTROPHILS NFR BLD AUTO: 73.9 % (ref 42.7–76)
NITRITE UR-MCNC: NEGATIVE MG/ML
NRBC BLD AUTO-RTO: 0 /100 WBC (ref 0–0.2)
PH UR: 7 [PH] (ref 5–8)
PLATELET # BLD AUTO: 282 10*3/MM3 (ref 140–450)
PMV BLD AUTO: 10.9 FL (ref 6–12)
POTASSIUM SERPL-SCNC: 3.8 MMOL/L (ref 3.5–5.2)
PROT ?TM UR-MCNC: 14 MG/DL
PROT SERPL-MCNC: 6 G/DL (ref 6–8.5)
PROT UR STRIP-MCNC: ABNORMAL MG/DL
PROT/CREAT UR: 109.9 MG/G CREA (ref 0–200)
RBC # BLD AUTO: 3.96 10*6/MM3 (ref 3.77–5.28)
RBC # UR STRIP: NEGATIVE /UL
SODIUM SERPL-SCNC: 135 MMOL/L (ref 136–145)
SP GR UR: 1.01 (ref 1–1.03)
UROBILINOGEN UR QL: NORMAL
WBC NRBC COR # BLD: 14.65 10*3/MM3 (ref 3.4–10.8)

## 2023-09-18 PROCEDURE — 84156 ASSAY OF PROTEIN URINE: CPT | Performed by: OBSTETRICS & GYNECOLOGY

## 2023-09-18 PROCEDURE — 81002 URINALYSIS NONAUTO W/O SCOPE: CPT | Performed by: OBSTETRICS & GYNECOLOGY

## 2023-09-18 PROCEDURE — G0463 HOSPITAL OUTPT CLINIC VISIT: HCPCS

## 2023-09-18 PROCEDURE — 82570 ASSAY OF URINE CREATININE: CPT | Performed by: OBSTETRICS & GYNECOLOGY

## 2023-09-18 PROCEDURE — 59025 FETAL NON-STRESS TEST: CPT

## 2023-09-18 PROCEDURE — 85025 COMPLETE CBC W/AUTO DIFF WBC: CPT | Performed by: OBSTETRICS & GYNECOLOGY

## 2023-09-18 PROCEDURE — 36415 COLL VENOUS BLD VENIPUNCTURE: CPT | Performed by: OBSTETRICS & GYNECOLOGY

## 2023-09-18 PROCEDURE — 80053 COMPREHEN METABOLIC PANEL: CPT | Performed by: OBSTETRICS & GYNECOLOGY

## 2023-09-18 RX ORDER — SODIUM CHLORIDE 0.9 % (FLUSH) 0.9 %
10 SYRINGE (ML) INJECTION AS NEEDED
Status: DISCONTINUED | OUTPATIENT
Start: 2023-09-18 | End: 2023-09-18 | Stop reason: HOSPADM

## 2023-09-18 RX ORDER — LIDOCAINE HYDROCHLORIDE 10 MG/ML
0.5 INJECTION, SOLUTION INFILTRATION; PERINEURAL ONCE AS NEEDED
Status: DISCONTINUED | OUTPATIENT
Start: 2023-09-18 | End: 2023-09-18 | Stop reason: HOSPADM

## 2023-09-18 RX ORDER — SODIUM CHLORIDE 9 MG/ML
40 INJECTION, SOLUTION INTRAVENOUS AS NEEDED
Status: DISCONTINUED | OUTPATIENT
Start: 2023-09-18 | End: 2023-09-18 | Stop reason: HOSPADM

## 2023-09-18 RX ORDER — SODIUM CHLORIDE 0.9 % (FLUSH) 0.9 %
10 SYRINGE (ML) INJECTION EVERY 12 HOURS SCHEDULED
Status: DISCONTINUED | OUTPATIENT
Start: 2023-09-18 | End: 2023-09-18 | Stop reason: HOSPADM

## 2023-09-18 NOTE — NON STRESS TEST
Triage Note - Nursing Documentation  Labor and Delivery Admission Log    Carol Vega  : 2000  MRN: [id number]  CSN: [id number]    Date in / Time in:  2023  Time in:     Date out / Time out:    Time out: 548    Nurse: Reena Pat RN    Patient Info: She is a 23 y.o. year old  at 37w6d with an MACI of 10/3/2023, by Last Menstrual Period who was seen on the [facility].    Chief Complaint:   Chief Complaint   Patient presents with    Non-stress Test     Elevated BP in office       Provider Instructions / Disposition: Labs obtained and MD reviewed, reactive NST, active fetus, follow up in office 2023 @ 1100, discharged home     Patient Active Problem List   Diagnosis    Severe episode of recurrent major depressive disorder, with psychotic features    Oppositional defiant disorder    MDD (major depressive disorder), severe    Cannabis abuse, continuous    Lower abdominal pain    Morbidly obese    PTSD (post-traumatic stress disorder)    Exposure to lead    Calculus of gallbladder without cholecystitis without obstruction       NST Documentation (Only applicable > 32 weeks): Interpretation A  Nonstress Test Interpretation A: Reactive (23 : Reena Pat RN)

## 2023-09-20 NOTE — PROGRESS NOTES
Prenatal Care Visit    Subjective   Chief Complaint   Patient presents with    Routine Prenatal Visit     Decreased fetal movement over the weekend, has had some vision changes.       History:   Carol is a  currently at 37w6d who presents for a prenatal care visit today.    She is experiencing elevated blood pressure, headache and decreased fetal movement.    Social History    Tobacco Use      Smoking status: Former        Types: Electronic Cigarette      Smokeless tobacco: Never      Tobacco comments: vapes       Objective   /98   Wt 117 kg (258 lb)   LMP 2022   BMI 45.70 kg/m²   Physical Exam:  Normal, gestational age-appropriate exam today        Plan   Medical Decision Making:    I have reviewed the prenatal labs and ultrasound(s) today. I have reviewed the most recent prenatal progress note(s).    Diagnosis: Supervision of high risk pregnancy   Elevated blood pressure  Decreased fetal movement  Cholelithiasis  BMI 45   Tests/Orders/Rx today: No orders of the defined types were placed in this encounter.      Medication Management: None     Topics discussed: Prenatal care milestones  kick counts and fetal movement  labor signs and symptoms  PIH precautions   To labor cash for preeclampsia work-up   Tests next visit: TBD   Next visit: TBD     Ludwig Hernandez MD  Obstetrics and Gynecology  [facility]

## 2023-09-21 ENCOUNTER — ROUTINE PRENATAL (OUTPATIENT)
Dept: OBSTETRICS AND GYNECOLOGY | Facility: CLINIC | Age: 23
End: 2023-09-21

## 2023-09-21 ENCOUNTER — PREP FOR SURGERY (OUTPATIENT)
Dept: OTHER | Facility: HOSPITAL | Age: 23
End: 2023-09-21
Payer: COMMERCIAL

## 2023-09-21 VITALS — SYSTOLIC BLOOD PRESSURE: 104 MMHG | WEIGHT: 259 LBS | DIASTOLIC BLOOD PRESSURE: 62 MMHG | BODY MASS INDEX: 45.88 KG/M2

## 2023-09-21 DIAGNOSIS — O09.93 ENCOUNTER FOR SUPERVISION OF HIGH RISK PREGNANCY IN THIRD TRIMESTER, ANTEPARTUM: Primary | ICD-10-CM

## 2023-09-21 DIAGNOSIS — K80.20 CHOLELITHIASIS AFFECTING PREGNANCY IN THIRD TRIMESTER, ANTEPARTUM: ICD-10-CM

## 2023-09-21 DIAGNOSIS — R03.0 ELEVATED BLOOD PRESSURE READING: ICD-10-CM

## 2023-09-21 DIAGNOSIS — O99.019 MATERNAL ANEMIA IN PREGNANCY, ANTEPARTUM: ICD-10-CM

## 2023-09-21 DIAGNOSIS — O26.613 CHOLELITHIASIS AFFECTING PREGNANCY IN THIRD TRIMESTER, ANTEPARTUM: ICD-10-CM

## 2023-09-21 DIAGNOSIS — K21.9 GASTROESOPHAGEAL REFLUX DISEASE WITHOUT ESOPHAGITIS: ICD-10-CM

## 2023-09-21 DIAGNOSIS — F41.8 ANXIETY WITH DEPRESSION: ICD-10-CM

## 2023-09-21 DIAGNOSIS — Z3A.39 39 WEEKS GESTATION OF PREGNANCY: Primary | ICD-10-CM

## 2023-09-21 RX ORDER — ONDANSETRON 4 MG/1
4 TABLET, FILM COATED ORAL ONCE AS NEEDED
OUTPATIENT
Start: 2023-09-21

## 2023-09-21 RX ORDER — LIDOCAINE HYDROCHLORIDE 10 MG/ML
0.5 INJECTION, SOLUTION INFILTRATION; PERINEURAL ONCE AS NEEDED
OUTPATIENT
Start: 2023-09-21

## 2023-09-21 RX ORDER — ONDANSETRON 2 MG/ML
4 INJECTION INTRAMUSCULAR; INTRAVENOUS EVERY 6 HOURS PRN
OUTPATIENT
Start: 2023-09-21

## 2023-09-21 RX ORDER — PROMETHAZINE HYDROCHLORIDE 12.5 MG/1
12.5 TABLET ORAL EVERY 6 HOURS PRN
OUTPATIENT
Start: 2023-09-21

## 2023-09-21 RX ORDER — SODIUM CHLORIDE, SODIUM LACTATE, POTASSIUM CHLORIDE, CALCIUM CHLORIDE 600; 310; 30; 20 MG/100ML; MG/100ML; MG/100ML; MG/100ML
125 INJECTION, SOLUTION INTRAVENOUS CONTINUOUS
OUTPATIENT
Start: 2023-09-21

## 2023-09-21 RX ORDER — ONDANSETRON 2 MG/ML
4 INJECTION INTRAMUSCULAR; INTRAVENOUS ONCE AS NEEDED
OUTPATIENT
Start: 2023-09-21

## 2023-09-21 RX ORDER — PROMETHAZINE HYDROCHLORIDE 12.5 MG/1
12.5 SUPPOSITORY RECTAL EVERY 6 HOURS PRN
OUTPATIENT
Start: 2023-09-21

## 2023-09-21 RX ORDER — SODIUM CHLORIDE 0.9 % (FLUSH) 0.9 %
10 SYRINGE (ML) INJECTION AS NEEDED
OUTPATIENT
Start: 2023-09-21

## 2023-09-21 RX ORDER — MISOPROSTOL 200 UG/1
800 TABLET ORAL AS NEEDED
OUTPATIENT
Start: 2023-09-21

## 2023-09-21 RX ORDER — OXYTOCIN/0.9 % SODIUM CHLORIDE 30/500 ML
250 PLASTIC BAG, INJECTION (ML) INTRAVENOUS CONTINUOUS
OUTPATIENT
Start: 2023-09-21 | End: 2023-09-21

## 2023-09-21 RX ORDER — CARBOPROST TROMETHAMINE 250 UG/ML
250 INJECTION, SOLUTION INTRAMUSCULAR AS NEEDED
OUTPATIENT
Start: 2023-09-21

## 2023-09-21 RX ORDER — HYDROXYZINE HYDROCHLORIDE 25 MG/1
50 TABLET, FILM COATED ORAL NIGHTLY PRN
OUTPATIENT
Start: 2023-09-21

## 2023-09-21 RX ORDER — ACETAMINOPHEN 325 MG/1
650 TABLET ORAL ONCE AS NEEDED
OUTPATIENT
Start: 2023-09-21

## 2023-09-21 RX ORDER — SODIUM CHLORIDE 9 MG/ML
40 INJECTION, SOLUTION INTRAVENOUS AS NEEDED
OUTPATIENT
Start: 2023-09-21

## 2023-09-21 RX ORDER — OXYTOCIN/0.9 % SODIUM CHLORIDE 30/500 ML
1-20 PLASTIC BAG, INJECTION (ML) INTRAVENOUS
OUTPATIENT
Start: 2023-09-21

## 2023-09-21 RX ORDER — ACETAMINOPHEN 325 MG/1
650 TABLET ORAL EVERY 4 HOURS PRN
OUTPATIENT
Start: 2023-09-21

## 2023-09-21 RX ORDER — METHYLERGONOVINE MALEATE 0.2 MG/ML
200 INJECTION INTRAVENOUS ONCE AS NEEDED
OUTPATIENT
Start: 2023-09-21

## 2023-09-21 RX ORDER — ONDANSETRON 4 MG/1
4 TABLET, FILM COATED ORAL EVERY 6 HOURS PRN
OUTPATIENT
Start: 2023-09-21

## 2023-09-21 RX ORDER — MORPHINE SULFATE 2 MG/ML
2 INJECTION, SOLUTION INTRAMUSCULAR; INTRAVENOUS ONCE AS NEEDED
OUTPATIENT
Start: 2023-09-21

## 2023-09-21 RX ORDER — OXYTOCIN/0.9 % SODIUM CHLORIDE 30/500 ML
999 PLASTIC BAG, INJECTION (ML) INTRAVENOUS ONCE
OUTPATIENT
Start: 2023-09-21 | End: 2023-09-21

## 2023-09-21 RX ORDER — SODIUM CHLORIDE 0.9 % (FLUSH) 0.9 %
10 SYRINGE (ML) INJECTION EVERY 12 HOURS SCHEDULED
OUTPATIENT
Start: 2023-09-21

## 2023-09-21 NOTE — PROGRESS NOTES
"Prenatal Care Visit    Subjective   No chief complaint on file.      History:   Carol is a  currently at 38w2d who presents for a prenatal care visit today.    ***       Objective   LMP 2022   Physical Exam:  Normal, gestational age-appropriate exam today ***         Assessment & Plan     IUP @ 38w2d  Routine care: I have reviewed the prenatal labs and ultrasound(s) today. I have reviewed the most recent prenatal progress note(s). ***    No diagnosis found.     Medication Management:    Topics discussed: Prenatal care milestones  {OB Discussion Topics:57477}   Tests next visit: {OB tests next visit:49889::\"none\"}   Next visit: *** week(s)     Tenisha Lane MD  Obstetrics and Gynecology  [facility]  "

## 2023-09-25 NOTE — PROGRESS NOTES
Chief Complaint  Routine Prenatal Visit    History of Present Illness:  Carol is a  currently at 38w6d who presents today with no specific complaints.  Patient does report having elevated blood pressures.  She denies any significant headaches or visual disturbances at this time.  She has been followed for cholestasis as well.  She is desiring an induction of labor.  She denies any significant cramping or contractions.  She does report good fetal movement.  Patient previously had been seen and had decreased fetal movement.  She was seen on labor and delivery.    Exam:  Vitals:  See prenatal flowsheet as noted and reviewed  General: Alert, cooperative, and does not appear in any distress  Abdomen:   See prenatal flowsheet as noted and reviewed    Uterus gravid, non-tender; no palpable masses    No guarding or rebound tenderness  Pelvic:  See prenatal flowsheet as noted and reviewed    Cervix 1 cm/50%/-3  Ext:  See prenatal flowsheet as noted and reviewed    Moves extremities well, no cyanosis and no redness  Urine:  See prenatal flowsheet as noted and reviewed    Data Review:  The following data was reviewed by: Eve Ricci MD on 2023:  Prenatal Labs:  Lab Results   Component Value Date    HGB 10.4 (L) 2023    RUBELLAABIGG 1.78 2023    HEPBSAG Non-Reactive 2023    HEPBSAG Non-Reactive 2023    ABO O 2023    RH Positive 2023    ABSCRN Negative 2023    IGT4IRP8 Non-Reactive 2023    HEPCVIRUSABY Non-Reactive 2023    HEPCVIRUSABY Non-Reactive 2023    STREPGPB Negative 2023    URINECX Final report 2023       Admission on 2023, Discharged on 2023   Component Date Value    Color 2023 Yellow     Clarity, UA 2023 Clear     Glucose, UA 2023 Negative     Bilirubin 2023 Negative     Ketones, UA 2023 Negative     Specific Gravity  2023 1.010     Blood, UA 2023 Negative     pH, Urine 2023  7.0     Protein, POC 09/18/2023 Trace (A)     Urobilinogen, UA 09/18/2023 Normal     Leukocytes 09/18/2023 Negative     Nitrite, UA 09/18/2023 Negative     Glucose 09/18/2023 93     BUN 09/18/2023 6     Creatinine 09/18/2023 0.50 (L)     Sodium 09/18/2023 135 (L)     Potassium 09/18/2023 3.8     Chloride 09/18/2023 102     CO2 09/18/2023 19.0 (L)     Calcium 09/18/2023 9.1     Total Protein 09/18/2023 6.0     Albumin 09/18/2023 3.2 (L)     ALT (SGPT) 09/18/2023 6     AST (SGOT) 09/18/2023 15     Alkaline Phosphatase 09/18/2023 128 (H)     Total Bilirubin 09/18/2023 0.4     Globulin 09/18/2023 2.8     A/G Ratio 09/18/2023 1.1     BUN/Creatinine Ratio 09/18/2023 12.0     Anion Gap 09/18/2023 14.0     eGFR 09/18/2023 137.0     Protein/Creatinine Ratio* 09/18/2023 109.9     Creatinine, Urine 09/18/2023 127.4     Total Protein, Urine 09/18/2023 14.0     WBC 09/18/2023 14.65 (H)     RBC 09/18/2023 3.96     Hemoglobin 09/18/2023 10.4 (L)     Hematocrit 09/18/2023 32.1 (L)     MCV 09/18/2023 81.1     MCH 09/18/2023 26.3 (L)     MCHC 09/18/2023 32.4     RDW 09/18/2023 15.6 (H)     RDW-SD 09/18/2023 45.5     MPV 09/18/2023 10.9     Platelets 09/18/2023 282     Neutrophil % 09/18/2023 73.9     Lymphocyte % 09/18/2023 16.4 (L)     Monocyte % 09/18/2023 6.8     Eosinophil % 09/18/2023 1.8     Basophil % 09/18/2023 0.2     Immature Grans % 09/18/2023 0.9 (H)     Neutrophils, Absolute 09/18/2023 10.84 (H)     Lymphocytes, Absolute 09/18/2023 2.40     Monocytes, Absolute 09/18/2023 0.99 (H)     Eosinophils, Absolute 09/18/2023 0.26     Basophils, Absolute 09/18/2023 0.03     Immature Grans, Absolute 09/18/2023 0.13 (H)     nRBC 09/18/2023 0.0    Admission on 09/13/2023, Discharged on 09/13/2023   Component Date Value    Color 09/13/2023 Yellow     Clarity, UA 09/13/2023 Clear     Glucose, UA 09/13/2023 Negative     Bilirubin 09/13/2023 Negative     Ketones, UA 09/13/2023 Negative     Specific Gravity  09/13/2023 1.005     Blood, UA  09/13/2023 Trace (A)     pH, Urine 09/13/2023 5.0     Protein, POC 09/13/2023 Negative     Urobilinogen, UA 09/13/2023 Normal     Leukocytes 09/13/2023 Negative     Nitrite, UA 09/13/2023 Negative     Rupture of Membranes 09/13/2023 Negative    Routine Prenatal on 09/06/2023   Component Date Value    Strep Gp B SON 09/06/2023 Negative     WBC 09/06/2023 11.70 (H)     RBC 09/06/2023 3.91     Hemoglobin 09/06/2023 10.2 (L)     Hematocrit 09/06/2023 31.0 (L)     MCV 09/06/2023 79.3     MCH 09/06/2023 26.1 (L)     MCHC 09/06/2023 32.9     RDW 09/06/2023 15.0     Platelets 09/06/2023 263     Neutrophil Rel % 09/06/2023 71.8     Lymphocyte Rel % 09/06/2023 19.3 (L)     Monocyte Rel % 09/06/2023 6.8     Eosinophil Rel % 09/06/2023 1.2     Basophil Rel % 09/06/2023 0.2     Neutrophils Absolute 09/06/2023 8.41 (H)     Lymphocytes Absolute 09/06/2023 2.26     Monocytes Absolute 09/06/2023 0.79     Eosinophils Absolute 09/06/2023 0.14     Basophils Absolute 09/06/2023 0.02     Immature Granulocyte Rel* 09/06/2023 0.7 (H)     Immature Grans Absolute 09/06/2023 0.08 (H)     nRBC 09/06/2023 0.0      Imaging:  US Ob Follow Up Transabdominal Approach  Growth overall growth 81 percentile.  AC 96 percentile.  NICOLAS 13.2.    Vertex.  Posterior placenta     Medical Records:  None    Assessment and Plan:  Problem List Items Addressed This Visit    None  Visit Diagnoses       Encounter for supervision of high risk pregnancy in third trimester, antepartum    -  Primary  Topics discussed:     GERD management  induction of labor  iron supplementation  kick counts and fetal movement  labor signs and symptoms  PIH precautions  Orders have been placed for induction of labor at 39 weeks.  Instructions and precautions have been given.    Elevated blood pressure reading      PIH precautions and instructions have been given.    Cholelithiasis affecting pregnancy in third trimester, antepartum      Patient is to follow-up with general surgeon  following her delivery.    Anxiety with depression      Patient is to continue her current medication as previously given.    Maternal anemia in pregnancy, antepartum      Patient is to continue her iron supplements.    Gastroesophageal reflux disease without esophagitis      Patient is to continue her Pepcid as previously given.          Follow Up/Instructions:  Follow up as scheduled.  Patient was given instructions and counseling regarding her condition or for health maintenance advice. Please see specific information pulled into the AVS if appropriate.     Note: Speech recognition transcription software may have been used to dictate portions of this document.  An attempt at proofreading has been made though minor errors in transcription may still be present.    This note was electronically signed.  Eve Ricci M.D.

## 2023-09-26 ENCOUNTER — ANESTHESIA (OUTPATIENT)
Dept: PERIOP | Facility: HOSPITAL | Age: 23
End: 2023-09-26
Payer: COMMERCIAL

## 2023-09-26 ENCOUNTER — HOSPITAL ENCOUNTER (INPATIENT)
Facility: HOSPITAL | Age: 23
LOS: 4 days | Discharge: HOME | End: 2023-09-30
Attending: MIDWIFE | Admitting: OBSTETRICS & GYNECOLOGY
Payer: COMMERCIAL

## 2023-09-26 ENCOUNTER — ANESTHESIA EVENT (OUTPATIENT)
Dept: PERIOP | Facility: HOSPITAL | Age: 23
End: 2023-09-26
Payer: COMMERCIAL

## 2023-09-26 ENCOUNTER — HOSPITAL ENCOUNTER (OUTPATIENT)
Dept: LABOR AND DELIVERY | Facility: HOSPITAL | Age: 23
Discharge: HOME | End: 2023-09-26
Payer: COMMERCIAL

## 2023-09-26 DIAGNOSIS — Z3A.39 39 WEEKS GESTATION OF PREGNANCY: ICD-10-CM

## 2023-09-26 DIAGNOSIS — Z98.891 S/P CESAREAN SECTION: Primary | ICD-10-CM

## 2023-09-26 PROBLEM — Z34.90 PREGNANCY: Status: ACTIVE | Noted: 2023-09-26

## 2023-09-26 LAB
ABO GROUP BLD: NORMAL
BASOPHILS # BLD AUTO: 0.02 10*3/MM3 (ref 0–0.2)
BASOPHILS NFR BLD AUTO: 0.1 % (ref 0–1.5)
BLD GP AB SCN SERPL QL: NEGATIVE
DEPRECATED RDW RBC AUTO: 47.3 FL (ref 37–54)
EOSINOPHIL # BLD AUTO: 0.19 10*3/MM3 (ref 0–0.4)
EOSINOPHIL NFR BLD AUTO: 1.4 % (ref 0.3–6.2)
ERYTHROCYTE [DISTWIDTH] IN BLOOD BY AUTOMATED COUNT: 16.3 % (ref 12.3–15.4)
HCT VFR BLD AUTO: 29.6 % (ref 34–46.6)
HGB BLD-MCNC: 9.8 G/DL (ref 12–15.9)
IMM GRANULOCYTES # BLD AUTO: 0.1 10*3/MM3 (ref 0–0.05)
IMM GRANULOCYTES NFR BLD AUTO: 0.7 % (ref 0–0.5)
LYMPHOCYTES # BLD AUTO: 2.45 10*3/MM3 (ref 0.7–3.1)
LYMPHOCYTES NFR BLD AUTO: 17.8 % (ref 19.6–45.3)
MCH RBC QN AUTO: 26.8 PG (ref 26.6–33)
MCHC RBC AUTO-ENTMCNC: 33.1 G/DL (ref 31.5–35.7)
MCV RBC AUTO: 81.1 FL (ref 79–97)
MONOCYTES # BLD AUTO: 1.09 10*3/MM3 (ref 0.1–0.9)
MONOCYTES NFR BLD AUTO: 7.9 % (ref 5–12)
NEUTROPHILS NFR BLD AUTO: 72.1 % (ref 42.7–76)
NEUTROPHILS NFR BLD AUTO: 9.93 10*3/MM3 (ref 1.7–7)
NRBC BLD AUTO-RTO: 0 /100 WBC (ref 0–0.2)
PLATELET # BLD AUTO: 264 10*3/MM3 (ref 140–450)
PMV BLD AUTO: 11.5 FL (ref 6–12)
RBC # BLD AUTO: 3.65 10*6/MM3 (ref 3.77–5.28)
RH BLD: POSITIVE
T&S EXPIRATION DATE: NORMAL
WBC NRBC COR # BLD: 13.78 10*3/MM3 (ref 3.4–10.8)

## 2023-09-26 PROCEDURE — 86920 COMPATIBILITY TEST SPIN: CPT

## 2023-09-26 PROCEDURE — 80307 DRUG TEST PRSMV CHEM ANLYZR: CPT | Performed by: MIDWIFE

## 2023-09-26 PROCEDURE — 59025 FETAL NON-STRESS TEST: CPT | Performed by: MIDWIFE

## 2023-09-26 PROCEDURE — 86901 BLOOD TYPING SEROLOGIC RH(D): CPT | Performed by: OBSTETRICS & GYNECOLOGY

## 2023-09-26 PROCEDURE — 59025 FETAL NON-STRESS TEST: CPT

## 2023-09-26 PROCEDURE — 85025 COMPLETE CBC W/AUTO DIFF WBC: CPT | Performed by: OBSTETRICS & GYNECOLOGY

## 2023-09-26 PROCEDURE — G0463 HOSPITAL OUTPT CLINIC VISIT: HCPCS

## 2023-09-26 PROCEDURE — 99222 1ST HOSP IP/OBS MODERATE 55: CPT | Performed by: MIDWIFE

## 2023-09-26 PROCEDURE — 86900 BLOOD TYPING SEROLOGIC ABO: CPT | Performed by: OBSTETRICS & GYNECOLOGY

## 2023-09-26 PROCEDURE — 86850 RBC ANTIBODY SCREEN: CPT | Performed by: OBSTETRICS & GYNECOLOGY

## 2023-09-26 RX ORDER — SODIUM CHLORIDE, SODIUM LACTATE, POTASSIUM CHLORIDE, CALCIUM CHLORIDE 600; 310; 30; 20 MG/100ML; MG/100ML; MG/100ML; MG/100ML
125 INJECTION, SOLUTION INTRAVENOUS CONTINUOUS
Status: DISCONTINUED | OUTPATIENT
Start: 2023-09-26 | End: 2023-09-28

## 2023-09-26 RX ORDER — CITRIC ACID/SODIUM CITRATE 334-500MG
30 SOLUTION, ORAL ORAL ONCE
Status: COMPLETED | OUTPATIENT
Start: 2023-09-26 | End: 2023-09-28

## 2023-09-26 RX ORDER — MORPHINE SULFATE 4 MG/ML
4 INJECTION, SOLUTION INTRAMUSCULAR; INTRAVENOUS EVERY 4 HOURS PRN
Status: DISCONTINUED | OUTPATIENT
Start: 2023-09-26 | End: 2023-09-28

## 2023-09-26 RX ORDER — SODIUM CHLORIDE 0.9 % (FLUSH) 0.9 %
10 SYRINGE (ML) INJECTION EVERY 12 HOURS SCHEDULED
Status: DISCONTINUED | OUTPATIENT
Start: 2023-09-26 | End: 2023-09-28

## 2023-09-26 RX ORDER — ACETAMINOPHEN 325 MG/1
650 TABLET ORAL EVERY 4 HOURS PRN
Status: DISCONTINUED | OUTPATIENT
Start: 2023-09-26 | End: 2023-09-28

## 2023-09-26 RX ORDER — MISOPROSTOL 100 MCG
25 TABLET ORAL EVERY 4 HOURS
Status: COMPLETED | OUTPATIENT
Start: 2023-09-26 | End: 2023-09-27

## 2023-09-26 RX ORDER — EPHEDRINE SULFATE 5 MG/ML
5 INJECTION INTRAVENOUS
Status: DISCONTINUED | OUTPATIENT
Start: 2023-09-26 | End: 2023-09-28

## 2023-09-26 RX ORDER — ONDANSETRON 2 MG/ML
4 INJECTION INTRAMUSCULAR; INTRAVENOUS EVERY 6 HOURS PRN
Status: DISCONTINUED | OUTPATIENT
Start: 2023-09-26 | End: 2023-09-28

## 2023-09-26 RX ORDER — MORPHINE SULFATE 2 MG/ML
6 INJECTION, SOLUTION INTRAMUSCULAR; INTRAVENOUS EVERY 4 HOURS PRN
Status: DISCONTINUED | OUTPATIENT
Start: 2023-09-26 | End: 2023-09-28

## 2023-09-26 RX ORDER — MAGNESIUM CARB/ALUMINUM HYDROX 105-160MG
30 TABLET,CHEWABLE ORAL ONCE
Status: DISCONTINUED | OUTPATIENT
Start: 2023-09-26 | End: 2023-09-28

## 2023-09-26 RX ORDER — SODIUM CHLORIDE 9 MG/ML
40 INJECTION, SOLUTION INTRAVENOUS AS NEEDED
Status: DISCONTINUED | OUTPATIENT
Start: 2023-09-26 | End: 2023-09-28

## 2023-09-26 RX ORDER — PROMETHAZINE HYDROCHLORIDE 12.5 MG/1
12.5 SUPPOSITORY RECTAL EVERY 6 HOURS PRN
Status: DISCONTINUED | OUTPATIENT
Start: 2023-09-26 | End: 2023-09-28

## 2023-09-26 RX ORDER — ONDANSETRON 2 MG/ML
4 INJECTION INTRAMUSCULAR; INTRAVENOUS ONCE AS NEEDED
Status: COMPLETED | OUTPATIENT
Start: 2023-09-26 | End: 2023-09-28

## 2023-09-26 RX ORDER — OXYTOCIN/0.9 % SODIUM CHLORIDE 30/500 ML
1-20 PLASTIC BAG, INJECTION (ML) INTRAVENOUS
Status: DISCONTINUED | OUTPATIENT
Start: 2023-09-26 | End: 2023-09-26

## 2023-09-26 RX ORDER — SODIUM CHLORIDE 0.9 % (FLUSH) 0.9 %
10 SYRINGE (ML) INJECTION AS NEEDED
Status: DISCONTINUED | OUTPATIENT
Start: 2023-09-26 | End: 2023-09-28

## 2023-09-26 RX ORDER — PROMETHAZINE HYDROCHLORIDE 12.5 MG/1
12.5 TABLET ORAL EVERY 6 HOURS PRN
Status: DISCONTINUED | OUTPATIENT
Start: 2023-09-26 | End: 2023-09-28

## 2023-09-26 RX ORDER — HYDROXYZINE HYDROCHLORIDE 25 MG/1
50 TABLET, FILM COATED ORAL NIGHTLY PRN
Status: DISCONTINUED | OUTPATIENT
Start: 2023-09-26 | End: 2023-09-28

## 2023-09-26 RX ORDER — LIDOCAINE HYDROCHLORIDE 10 MG/ML
0.5 INJECTION, SOLUTION INFILTRATION; PERINEURAL ONCE AS NEEDED
Status: DISCONTINUED | OUTPATIENT
Start: 2023-09-26 | End: 2023-09-28

## 2023-09-26 RX ORDER — OXYTOCIN/0.9 % SODIUM CHLORIDE 30/500 ML
1-20 PLASTIC BAG, INJECTION (ML) INTRAVENOUS
Status: DISCONTINUED | OUTPATIENT
Start: 2023-09-26 | End: 2023-09-28

## 2023-09-26 RX ORDER — ONDANSETRON 4 MG/1
4 TABLET, FILM COATED ORAL EVERY 6 HOURS PRN
Status: DISCONTINUED | OUTPATIENT
Start: 2023-09-26 | End: 2023-09-28

## 2023-09-26 RX ADMIN — HYDROXYZINE HYDROCHLORIDE 50 MG: 25 TABLET, FILM COATED ORAL at 23:16

## 2023-09-26 RX ADMIN — SODIUM CHLORIDE, POTASSIUM CHLORIDE, SODIUM LACTATE AND CALCIUM CHLORIDE 125 ML/HR: 600; 310; 30; 20 INJECTION, SOLUTION INTRAVENOUS at 23:17

## 2023-09-26 RX ADMIN — SODIUM CHLORIDE, POTASSIUM CHLORIDE, SODIUM LACTATE AND CALCIUM CHLORIDE 125 ML/HR: 600; 310; 30; 20 INJECTION, SOLUTION INTRAVENOUS at 16:45

## 2023-09-26 RX ADMIN — Medication 25 MCG: at 17:15

## 2023-09-26 RX ADMIN — Medication 25 MCG: at 21:12

## 2023-09-26 NOTE — ANESTHESIA PREPROCEDURE EVALUATION
Anesthesia Evaluation     Patient summary reviewed and Nursing notes reviewed   no history of anesthetic complications:   NPO Solid Status: > 8 hours  NPO Liquid Status: > 8 hours           Airway   Mallampati: II  TM distance: >3 FB  Neck ROM: full  Possible difficult intubation  Dental - normal exam     Pulmonary - normal exam   Cardiovascular - normal exam        Neuro/Psych  (+) seizures well controlled, headaches, psychiatric history Anxiety, Depression and PTSD  GI/Hepatic/Renal/Endo    (+) obesity, morbid obesity, renal disease stones    Musculoskeletal     Abdominal  - normal exam   Substance History      OB/GYN    (+) Pregnant        Other                      Anesthesia Plan    ASA 2 - emergent     epidural     (Epidural to C/S)    Anesthetic plan, risks, benefits, and alternatives have been provided, discussed and informed consent has been obtained with: patient.  Pre-procedure education provided    CODE STATUS:

## 2023-09-26 NOTE — H&P
[facility]  Carol Vega  : 2000  MRN: [id number]  [facility]: [id number]    History and Physical    Chief Complaint   Patient presents with    Scheduled Induction      Subjective   Carol Vega is a 23 y.o. year old  with an Estimated Date of Delivery: 10/3/23 currently 39w0d presenting for induction of labor for elective at term per patient request.     Risks of labor induction including prolongation of labor, increased risks for both  section and operative vaginal birth have been discussed at length.     Prenatal care has been with [facility].  It has been complicated by nausea, GERD and Cholelithiasis. First PNV on 23 @ 6 weeks with 17 visits. Weight gain = 47 lbs.     OB History    Para Term  AB Living   1 0 0 0 0 0   SAB IAB Ectopic Molar Multiple Live Births   0 0 0 0 0 0      # Outcome Date GA Lbr Dangelo/2nd Weight Sex Delivery Anes PTL Lv   1 Current              Past Medical History:   Diagnosis Date    Anxiety     Anxiety     Chlamydia     Depression     Endometriosis     Gonorrhea     Kidney stone     Major depression     Migraine     PMS (premenstrual syndrome)     PTSD (post-traumatic stress disorder)     Seizures     Self-injurious behavior     hx of cutting starting at age 15    Suicide attempt     3/25/19-overdose attempt, 2017 overdose attempt    Urinary tract infection      Past Surgical History:   Procedure Laterality Date    ESOPHAGEAL DILATATION      TONSILLECTOMY         Current Facility-Administered Medications:     acetaminophen (TYLENOL) tablet 650 mg, 650 mg, Oral, Q4H PRN, Judie Joynerorah A, CNM    ePHEDrine Sulfate (Pressors) 5 MG/ML injection 5 mg, 5 mg, Intravenous, Q10 Min PRN, Heather Joyner A, CNM    fentaNYL 2mcg/mL and ropivacaine 0.2% in NS epidural 100 mL, 10 mL/hr, Epidural, Continuous, Judie Joynerorah A, CNM    hydrOXYzine (ATARAX) tablet 50 mg, 50 mg, Oral, Nightly PRN, Judie Joynerorah A, CNM    lactated ringers bolus 1,000 mL, 1,000 mL,  Intravenous, Once, Jenny Joynerh A, CNM    lactated ringers bolus 1,000 mL, 1,000 mL, Intravenous, Once, Ar, Heather A, CNM    lactated ringers infusion, 125 mL/hr, Intravenous, Continuous, Heather Joyner A, CNM, Last Rate: 125 mL/hr at 09/26/23 1645, 125 mL/hr at 09/26/23 1645    lidocaine (XYLOCAINE) 1 % injection 0.5 mL, 0.5 mL, Intradermal, Once PRN, Heather Joyner A, SABRINAM    mineral oil liquid 30 mL, 30 mL, Topical, Once, Jenny Joynerh A, CNM    miSOPROStol (CYTOTEC) split tablet 25 mcg, 25 mcg, Vaginal, Q4H, Heather Joyner A, CNM, 25 mcg at 09/26/23 1715    Morphine sulfate (PF) injection 4 mg, 4 mg, Intravenous, Q4H PRN, Heather Joyner A, CNM    Morphine sulfate (PF) injection 6 mg, 6 mg, Intravenous, Q4H PRN, Jenny Joynerh A, CNM    ondansetron (ZOFRAN) injection 4 mg, 4 mg, Intravenous, Once PRN, Jenny Joynerh A, CNM    ondansetron (ZOFRAN) tablet 4 mg, 4 mg, Oral, Q6H PRN **OR** ondansetron (ZOFRAN) injection 4 mg, 4 mg, Intravenous, Q6H PRN, Jenny Joynerh A, CNM    oxytocin (PITOCIN) 30 units in 0.9% sodium chloride 500 mL (premix), 1-20 cheng-units/min, Intravenous, Titrated, Jenny Joynerh A, CNM    promethazine (PHENERGAN) suppository 12.5 mg, 12.5 mg, Rectal, Q6H PRN **OR** promethazine (PHENERGAN) tablet 12.5 mg, 12.5 mg, Oral, Q6H PRN, Jenny Joynerh A, CNM    Sod Citrate-Citric Acid (BICITRA) oral solution 30 mL, 30 mL, Oral, Once, Heather Joyner A, SABRINAM    sodium chloride 0.9 % flush 10 mL, 10 mL, Intravenous, Q12H, Jenny Joynerh A, CNM    sodium chloride 0.9 % flush 10 mL, 10 mL, Intravenous, PRN, Ar, Heather A, CNM    sodium chloride 0.9 % infusion 40 mL, 40 mL, Intravenous, PRN, Ar, Heather A, CNM    Allergies   Allergen Reactions    Iodine Hives    Latex Hives     Social History    Tobacco Use      Smoking status: Every Day        Types: Electronic Cigarette      Smokeless tobacco: Never      Tobacco comments: vapes    Review of Systems   Constitutional: Negative.   "  Respiratory: Negative.     Cardiovascular: Negative.    Gastrointestinal: Negative.    Genitourinary: Negative.    Musculoskeletal: Negative.    Neurological: Negative.    Psychiatric/Behavioral: Negative.     All other systems reviewed and are negative.      Objective   /74 (BP Location: Right arm, Patient Position: Sitting)   Pulse 96   Temp 98.9 °F (37.2 °C) (Oral)   Resp 17   Ht 160 cm (63\")   Wt 117 kg (258 lb 12.8 oz)   LMP 12/27/2022   SpO2 99%   Breastfeeding Yes   BMI 45.84 kg/m²                                           General:  well developed; well nourished  no acute distress   Neck:    Heart:   supple and no masses  normal apical impulse  regular rate and rhythm   Lungs: breathing is unlabored   Abdomen: Gravid and nontender  EFW: 7.5-8#, growth scan @ 37 weeks =81%, AC 96%, posterior placenta   FHT's: reassuring, reactive, and category 1   Cervix: was checked (by me): 1 cm / 50 % / -3 posterior   Presentation: cephalic   Contractions: none   Extremities:   normal appearance with no cyanosis or edema and no calf tenderness   Skin:  Skin color, texture, turgor normal. No rashes or lesions or Skin warm and dry   Psych:  Normal. and Alert and oriented, appropriate affect.       Prenatal Labs  Lab Results   Component Value Date    HGB 9.8 (L) 09/26/2023    HEPBSAG Non-Reactive 02/26/2023    HEPBSAG Non-Reactive 02/26/2023    ABSCRN Negative 02/26/2023    [id number] Non-Reactive 02/26/2023    HEPCVIRUSABY Non-Reactive 02/26/2023    HEPCVIRUSABY Non-Reactive 02/26/2023    STREPGPB Negative 09/06/2023    URINECX Final report 07/31/2023       Current Labs Reviewed   Lab Results (last 24 hours)       Procedure Component Value Units Date/Time    CBC & Differential [[id number]]  (Abnormal) Collected: 09/26/23 1637    Specimen: Blood Updated: 09/26/23 1717    Narrative:      The following orders were created for panel order CBC & Differential.  Procedure                               Abnormality      "    Status                     ---------                               -----------         ------                     CBC Auto Differential[[id number]]        Abnormal            Final result                 Please view results for these tests on the individual orders.    CBC Auto Differential [[id number]]  (Abnormal) Collected: 23 163    Specimen: Blood Updated: 23     WBC 13.78 10*3/mm3      RBC 3.65 10*6/mm3      Hemoglobin 9.8 g/dL      Hematocrit 29.6 %      MCV 81.1 fL      MCH 26.8 pg      MCHC 33.1 g/dL      RDW 16.3 %      RDW-SD 47.3 fl      MPV 11.5 fL      Platelets 264 10*3/mm3      Neutrophil % 72.1 %      Lymphocyte % 17.8 %      Monocyte % 7.9 %      Eosinophil % 1.4 %      Basophil % 0.1 %      Immature Grans % 0.7 %      Neutrophils, Absolute 9.93 10*3/mm3      Lymphocytes, Absolute 2.45 10*3/mm3      Monocytes, Absolute 1.09 10*3/mm3      Eosinophils, Absolute 0.19 10*3/mm3      Basophils, Absolute 0.02 10*3/mm3      Immature Grans, Absolute 0.10 10*3/mm3      nRBC 0.0 /100 WBC                ASSESSMENT  IUP at 39w0d  Induction of labor because of elective at term per patient request  Group B strep status: negative  Reactive NST    PLAN  Admit to   Penny bulb attempted with speculum and unable to advance catheter through cervix due to angle. Attempted to insert catheter without speculum but unsuccessful. Tolerated by mom and baby.  Cytotec 25 mcg vaginally q 4hrs x 3 doses  Pitocin per protocol 4 hours after last Cytotec dose  All procedures explained to patient and partner. Questions answered and verbalized understanding.  Anticipate     Heather Joyner, APRN  2023  17:20 EDT

## 2023-09-27 PROCEDURE — 51703 INSERT BLADDER CATH COMPLEX: CPT

## 2023-09-27 PROCEDURE — 3E033VJ INTRODUCTION OF OTHER HORMONE INTO PERIPHERAL VEIN, PERCUTANEOUS APPROACH: ICD-10-PCS | Performed by: OBSTETRICS & GYNECOLOGY

## 2023-09-27 PROCEDURE — 10907ZC DRAINAGE OF AMNIOTIC FLUID, THERAPEUTIC FROM PRODUCTS OF CONCEPTION, VIA NATURAL OR ARTIFICIAL OPENING: ICD-10-PCS | Performed by: OBSTETRICS & GYNECOLOGY

## 2023-09-27 PROCEDURE — C1755 CATHETER, INTRASPINAL: HCPCS | Performed by: NURSE ANESTHETIST, CERTIFIED REGISTERED

## 2023-09-27 PROCEDURE — 25010000002 MORPHINE PER 10 MG: Performed by: MIDWIFE

## 2023-09-27 RX ADMIN — SODIUM CHLORIDE 500 MG: 900 INJECTION, SOLUTION INTRAVENOUS at 15:10

## 2023-09-27 RX ADMIN — SODIUM CHLORIDE, POTASSIUM CHLORIDE, SODIUM LACTATE AND CALCIUM CHLORIDE 125 ML/HR: 600; 310; 30; 20 INJECTION, SOLUTION INTRAVENOUS at 07:03

## 2023-09-27 RX ADMIN — Medication 1 MILLI-UNITS/MIN: at 05:17

## 2023-09-27 RX ADMIN — Medication 10 ML/HR: at 23:08

## 2023-09-27 RX ADMIN — Medication 25 MCG: at 01:15

## 2023-09-27 RX ADMIN — MORPHINE SULFATE 4 MG: 4 INJECTION, SOLUTION INTRAMUSCULAR; INTRAVENOUS at 03:55

## 2023-09-27 RX ADMIN — Medication 10 ML/HR: at 15:12

## 2023-09-27 RX ADMIN — SODIUM CHLORIDE, POTASSIUM CHLORIDE, SODIUM LACTATE AND CALCIUM CHLORIDE 125 ML/HR: 600; 310; 30; 20 INJECTION, SOLUTION INTRAVENOUS at 19:10

## 2023-09-27 NOTE — PLAN OF CARE
Goal Outcome Evaluation:  Plan of Care Reviewed With: patient, significant other, mother        Progress: improving          Pt arrived for IOL yesterday and was given 3 doses of cytotec, last dose at 0112. Pitocin started at 0515. Pt given dose of morphine 4 mg for contraction pain overnight. Pt plans on getting an epidural once pain is intolerable. Last VE at 0110 was 2/60-70%/-3. FOB at bedside and mother plans on coming back today to be with pt during delivery.

## 2023-09-27 NOTE — ANESTHESIA PROCEDURE NOTES
Labor Epidural      Patient reassessed immediately prior to procedure    Patient location during procedure: OB  Start Time: 9/27/2023 3:02 PM  Performed By  CRNA/CAA: Perez oHugh CRNA  Preanesthetic Checklist  Completed: patient identified, IV checked, site marked, risks and benefits discussed, surgical consent, monitors and equipment checked, pre-op evaluation and timeout performed  Additional Notes  Risks discussed , including but not limited to:  Headache, itching, n&v, infection, failure, decreased blood pressure, permanent chronic/back pain, nerve damage, paralysis, etc. All questions answered and informed consent obtained. Skin localized with lidocaine skin wheel. Test dose 2+3 ml of 1.5% lidocaine with 1:200,000 epi.  Prep:  Pt Position:sitting  Sterile Tech:cap, gloves, mask and sterile barrier  Prep:chlorhexidine gluconate and isopropyl alcohol  Monitoring:blood pressure monitoring and continuous pulse oximetry  Epidural Block Procedure:  Approach:midline  Guidance:landmark technique and palpation technique  Location:L3-L4  Needle Type:Tuohy  Needle Gauge:18 G  Loss of Resistance Medium: air  Loss of Resistance: 8cm  Cath Depth at skin:15 cm  Paresthesia: none  Aspiration:negative  Test Dose:negative  Number of Attempts: 1  Post Assessment:  Dressing:occlusive dressing applied and secured with tape  Pt Tolerance:patient tolerated the procedure well with no apparent complications  Complications:no

## 2023-09-27 NOTE — PROGRESS NOTES
SVE 2.5/50/-3  AROM at 1230 with a small amount of clear fluid returned, mother and baby tolerated the procedure well, no complications  Progressive pitocin  Epidural prn    Ludwig Hernandez MD  Obstetrics and Gynecology  [facility]

## 2023-09-28 PROBLEM — E66.01 MORBIDLY OBESE: Status: RESOLVED | Noted: 2019-11-14 | Resolved: 2023-09-28

## 2023-09-28 PROBLEM — O42.90 PROLONGED RUPTURE OF MEMBRANES: Status: ACTIVE | Noted: 2023-09-28

## 2023-09-28 PROBLEM — R10.30 LOWER ABDOMINAL PAIN: Status: RESOLVED | Noted: 2019-10-26 | Resolved: 2023-09-28

## 2023-09-28 PROBLEM — Z98.891 S/P CESAREAN SECTION: Status: ACTIVE | Noted: 2023-09-28

## 2023-09-28 PROBLEM — O61.8 OTHER FAILED INDUCTION OF LABOR: Status: ACTIVE | Noted: 2023-09-28

## 2023-09-28 PROBLEM — Z34.90 PREGNANCY: Status: RESOLVED | Noted: 2023-09-26 | Resolved: 2023-09-28

## 2023-09-28 LAB
AMPHET+METHAMPHET UR QL: NEGATIVE
AMPHETAMINES UR QL: NEGATIVE
BARBITURATES UR QL SCN: NEGATIVE
BENZODIAZ UR QL SCN: NEGATIVE
BUPRENORPHINE SERPL-MCNC: NEGATIVE NG/ML
CANNABINOIDS SERPL QL: NEGATIVE
COCAINE UR QL: NEGATIVE
FENTANYL UR-MCNC: NEGATIVE NG/ML
METHADONE UR QL SCN: NEGATIVE
OPIATES UR QL: NEGATIVE
OXYCODONE UR QL SCN: NEGATIVE
PCP UR QL SCN: NEGATIVE
PROPOXYPH UR QL: NEGATIVE
TRICYCLICS UR QL SCN: NEGATIVE

## 2023-09-28 PROCEDURE — 25010000002 KETOROLAC TROMETHAMINE PER 15 MG: Performed by: OBSTETRICS & GYNECOLOGY

## 2023-09-28 PROCEDURE — 25010000002 FENTANYL CITRATE (PF) 100 MCG/2ML SOLUTION: Performed by: NURSE ANESTHETIST, CERTIFIED REGISTERED

## 2023-09-28 PROCEDURE — 25010000002 AZITHROMYCIN PER 500 MG: Performed by: OBSTETRICS & GYNECOLOGY

## 2023-09-28 PROCEDURE — S0260 H&P FOR SURGERY: HCPCS | Performed by: OBSTETRICS & GYNECOLOGY

## 2023-09-28 PROCEDURE — 25010000002 ONDANSETRON PER 1 MG: Performed by: MIDWIFE

## 2023-09-28 PROCEDURE — 59515 CESAREAN DELIVERY: CPT | Performed by: OBSTETRICS & GYNECOLOGY

## 2023-09-28 PROCEDURE — 25010000002 BUPIVACAINE (PF) 0.25 % SOLUTION: Performed by: NURSE ANESTHETIST, CERTIFIED REGISTERED

## 2023-09-28 PROCEDURE — 0 CEFAZOLIN SODIUM-DEXTROSE 2-3 GM-%(50ML) RECONSTITUTED SOLUTION: Performed by: NURSE ANESTHETIST, CERTIFIED REGISTERED

## 2023-09-28 PROCEDURE — 25810000003 SODIUM CHLORIDE 0.9 % SOLUTION: Performed by: OBSTETRICS & GYNECOLOGY

## 2023-09-28 PROCEDURE — 51703 INSERT BLADDER CATH COMPLEX: CPT

## 2023-09-28 PROCEDURE — 25010000002 KETOROLAC TROMETHAMINE PER 15 MG: Performed by: NURSE ANESTHETIST, CERTIFIED REGISTERED

## 2023-09-28 PROCEDURE — 25010000002 OXYTOCIN PER 10 UNITS: Performed by: NURSE ANESTHETIST, CERTIFIED REGISTERED

## 2023-09-28 DEVICE — ABSORBABLE HEMOSTAT (OXIDIZED REGENERATED CELLULOSE, U.S.P.)
Type: IMPLANTABLE DEVICE | Site: ABDOMEN | Status: FUNCTIONAL
Brand: SURGICEL

## 2023-09-28 RX ORDER — OXYTOCIN/0.9 % SODIUM CHLORIDE 30/500 ML
250 PLASTIC BAG, INJECTION (ML) INTRAVENOUS CONTINUOUS
Status: ACTIVE | OUTPATIENT
Start: 2023-09-28 | End: 2023-09-28

## 2023-09-28 RX ORDER — SIMETHICONE 80 MG
80 TABLET,CHEWABLE ORAL 4 TIMES DAILY PRN
Status: DISCONTINUED | OUTPATIENT
Start: 2023-09-28 | End: 2023-09-30 | Stop reason: HOSPADM

## 2023-09-28 RX ORDER — OXYTOCIN/0.9 % SODIUM CHLORIDE 30/500 ML
999 PLASTIC BAG, INJECTION (ML) INTRAVENOUS ONCE
Status: DISCONTINUED | OUTPATIENT
Start: 2023-09-28 | End: 2023-09-30 | Stop reason: HOSPADM

## 2023-09-28 RX ORDER — ACETAMINOPHEN 500 MG
1000 TABLET ORAL EVERY 8 HOURS
Status: DISCONTINUED | OUTPATIENT
Start: 2023-09-28 | End: 2023-09-30 | Stop reason: HOSPADM

## 2023-09-28 RX ORDER — CARBOPROST TROMETHAMINE 250 UG/ML
250 INJECTION, SOLUTION INTRAMUSCULAR
Status: DISCONTINUED | OUTPATIENT
Start: 2023-09-28 | End: 2023-09-30 | Stop reason: HOSPADM

## 2023-09-28 RX ORDER — CEFAZOLIN SODIUM 2 G/50ML
SOLUTION INTRAVENOUS AS NEEDED
Status: DISCONTINUED | OUTPATIENT
Start: 2023-09-28 | End: 2023-09-28 | Stop reason: SURG

## 2023-09-28 RX ORDER — FENTANYL CITRATE 50 UG/ML
INJECTION, SOLUTION INTRAMUSCULAR; INTRAVENOUS AS NEEDED
Status: DISCONTINUED | OUTPATIENT
Start: 2023-09-28 | End: 2023-09-28 | Stop reason: SURG

## 2023-09-28 RX ORDER — HYDROCORTISONE 25 MG/G
CREAM TOPICAL 3 TIMES DAILY PRN
Status: DISCONTINUED | OUTPATIENT
Start: 2023-09-28 | End: 2023-09-30 | Stop reason: HOSPADM

## 2023-09-28 RX ORDER — NALOXONE HCL 0.4 MG/ML
0.4 VIAL (ML) INJECTION
Status: ACTIVE | OUTPATIENT
Start: 2023-09-28 | End: 2023-09-29

## 2023-09-28 RX ORDER — BUPIVACAINE HYDROCHLORIDE 2.5 MG/ML
INJECTION, SOLUTION EPIDURAL; INFILTRATION; INTRACAUDAL AS NEEDED
Status: DISCONTINUED | OUTPATIENT
Start: 2023-09-28 | End: 2023-09-28 | Stop reason: SURG

## 2023-09-28 RX ORDER — KETOROLAC TROMETHAMINE 30 MG/ML
INJECTION, SOLUTION INTRAMUSCULAR; INTRAVENOUS AS NEEDED
Status: DISCONTINUED | OUTPATIENT
Start: 2023-09-28 | End: 2023-09-28 | Stop reason: SURG

## 2023-09-28 RX ORDER — MISOPROSTOL 200 UG/1
800 TABLET ORAL AS NEEDED
Status: DISCONTINUED | OUTPATIENT
Start: 2023-09-28 | End: 2023-09-30 | Stop reason: HOSPADM

## 2023-09-28 RX ORDER — NICOTINE 21 MG/24HR
1 PATCH, TRANSDERMAL 24 HOURS TRANSDERMAL EVERY 24 HOURS
Status: DISCONTINUED | OUTPATIENT
Start: 2023-09-28 | End: 2023-09-28 | Stop reason: SDUPTHER

## 2023-09-28 RX ORDER — ONDANSETRON 4 MG/1
4 TABLET, FILM COATED ORAL EVERY 8 HOURS PRN
Status: DISCONTINUED | OUTPATIENT
Start: 2023-09-28 | End: 2023-09-30 | Stop reason: HOSPADM

## 2023-09-28 RX ORDER — NICOTINE 21 MG/24HR
1 PATCH, TRANSDERMAL 24 HOURS TRANSDERMAL EVERY 24 HOURS
Status: DISCONTINUED | OUTPATIENT
Start: 2023-09-28 | End: 2023-09-30 | Stop reason: HOSPADM

## 2023-09-28 RX ORDER — ENOXAPARIN SODIUM 100 MG/ML
40 INJECTION SUBCUTANEOUS EVERY 12 HOURS
Status: DISCONTINUED | OUTPATIENT
Start: 2023-09-29 | End: 2023-09-30 | Stop reason: HOSPADM

## 2023-09-28 RX ORDER — CITRIC ACID/SODIUM CITRATE 334-500MG
30 SOLUTION, ORAL ORAL ONCE
Status: DISCONTINUED | OUTPATIENT
Start: 2023-09-28 | End: 2023-09-28

## 2023-09-28 RX ORDER — FAMOTIDINE 10 MG/ML
20 INJECTION, SOLUTION INTRAVENOUS ONCE AS NEEDED
Status: COMPLETED | OUTPATIENT
Start: 2023-09-28 | End: 2023-09-28

## 2023-09-28 RX ORDER — ACETAMINOPHEN 500 MG
1000 TABLET ORAL ONCE
Status: COMPLETED | OUTPATIENT
Start: 2023-09-28 | End: 2023-09-28

## 2023-09-28 RX ORDER — MISOPROSTOL 200 UG/1
800 TABLET ORAL ONCE AS NEEDED
Status: DISCONTINUED | OUTPATIENT
Start: 2023-09-28 | End: 2023-09-30 | Stop reason: HOSPADM

## 2023-09-28 RX ORDER — OXYCODONE HYDROCHLORIDE 5 MG/1
10 TABLET ORAL EVERY 4 HOURS PRN
Status: DISCONTINUED | OUTPATIENT
Start: 2023-09-28 | End: 2023-09-30 | Stop reason: HOSPADM

## 2023-09-28 RX ORDER — PROMETHAZINE HYDROCHLORIDE 12.5 MG/1
12.5 TABLET ORAL EVERY 6 HOURS PRN
Status: DISCONTINUED | OUTPATIENT
Start: 2023-09-28 | End: 2023-09-28 | Stop reason: SDUPTHER

## 2023-09-28 RX ORDER — ONDANSETRON 2 MG/ML
4 INJECTION INTRAMUSCULAR; INTRAVENOUS ONCE AS NEEDED
Status: DISCONTINUED | OUTPATIENT
Start: 2023-09-28 | End: 2023-09-30 | Stop reason: HOSPADM

## 2023-09-28 RX ORDER — ONDANSETRON 2 MG/ML
4 INJECTION INTRAMUSCULAR; INTRAVENOUS EVERY 6 HOURS PRN
Status: DISCONTINUED | OUTPATIENT
Start: 2023-09-28 | End: 2023-09-30 | Stop reason: HOSPADM

## 2023-09-28 RX ORDER — OXYCODONE HYDROCHLORIDE 5 MG/1
5 TABLET ORAL EVERY 4 HOURS PRN
Status: DISCONTINUED | OUTPATIENT
Start: 2023-09-28 | End: 2023-09-30 | Stop reason: HOSPADM

## 2023-09-28 RX ORDER — HYDROXYZINE HYDROCHLORIDE 25 MG/1
50 TABLET, FILM COATED ORAL EVERY 6 HOURS PRN
Status: DISCONTINUED | OUTPATIENT
Start: 2023-09-28 | End: 2023-09-30 | Stop reason: HOSPADM

## 2023-09-28 RX ORDER — ONDANSETRON 2 MG/ML
4 INJECTION INTRAMUSCULAR; INTRAVENOUS ONCE AS NEEDED
Status: ACTIVE | OUTPATIENT
Start: 2023-09-28 | End: 2023-09-29

## 2023-09-28 RX ORDER — NALBUPHINE HYDROCHLORIDE 10 MG/ML
2.5 INJECTION, SOLUTION INTRAMUSCULAR; INTRAVENOUS; SUBCUTANEOUS EVERY 4 HOURS PRN
Status: ACTIVE | OUTPATIENT
Start: 2023-09-28 | End: 2023-09-29

## 2023-09-28 RX ORDER — IBUPROFEN 800 MG/1
800 TABLET ORAL EVERY 8 HOURS
Status: DISCONTINUED | OUTPATIENT
Start: 2023-09-28 | End: 2023-09-30 | Stop reason: HOSPADM

## 2023-09-28 RX ORDER — SODIUM CHLORIDE, SODIUM LACTATE, POTASSIUM CHLORIDE, CALCIUM CHLORIDE 600; 310; 30; 20 MG/100ML; MG/100ML; MG/100ML; MG/100ML
125 INJECTION, SOLUTION INTRAVENOUS CONTINUOUS
Status: DISCONTINUED | OUTPATIENT
Start: 2023-09-28 | End: 2023-09-28

## 2023-09-28 RX ORDER — MORPHINE SULFATE 4 MG/ML
4 INJECTION, SOLUTION INTRAMUSCULAR; INTRAVENOUS ONCE AS NEEDED
Status: DISCONTINUED | OUTPATIENT
Start: 2023-09-28 | End: 2023-09-30 | Stop reason: HOSPADM

## 2023-09-28 RX ORDER — ACETAMINOPHEN 325 MG/1
650 TABLET ORAL ONCE AS NEEDED
Status: DISCONTINUED | OUTPATIENT
Start: 2023-09-28 | End: 2023-09-30 | Stop reason: HOSPADM

## 2023-09-28 RX ORDER — OXYTOCIN/0.9 % SODIUM CHLORIDE 30/500 ML
1-30 PLASTIC BAG, INJECTION (ML) INTRAVENOUS
Status: DISCONTINUED | OUTPATIENT
Start: 2023-09-27 | End: 2023-09-28

## 2023-09-28 RX ORDER — LIDOCAINE HYDROCHLORIDE 10 MG/ML
0.5 INJECTION, SOLUTION INFILTRATION; PERINEURAL ONCE AS NEEDED
Status: DISCONTINUED | OUTPATIENT
Start: 2023-09-28 | End: 2023-09-28 | Stop reason: HOSPADM

## 2023-09-28 RX ORDER — CARBOPROST TROMETHAMINE 250 UG/ML
250 INJECTION, SOLUTION INTRAMUSCULAR AS NEEDED
Status: DISCONTINUED | OUTPATIENT
Start: 2023-09-28 | End: 2023-09-30 | Stop reason: HOSPADM

## 2023-09-28 RX ORDER — CEFAZOLIN SODIUM IN 0.9 % NACL 3 G/100 ML
3 INTRAVENOUS SOLUTION, PIGGYBACK (ML) INTRAVENOUS ONCE
Status: DISCONTINUED | OUTPATIENT
Start: 2023-09-28 | End: 2023-09-28 | Stop reason: HOSPADM

## 2023-09-28 RX ORDER — SODIUM CHLORIDE 0.9 % (FLUSH) 0.9 %
10 SYRINGE (ML) INJECTION AS NEEDED
Status: DISCONTINUED | OUTPATIENT
Start: 2023-09-28 | End: 2023-09-28 | Stop reason: HOSPADM

## 2023-09-28 RX ORDER — PROMETHAZINE HYDROCHLORIDE 25 MG/1
25 TABLET ORAL EVERY 6 HOURS PRN
Status: DISCONTINUED | OUTPATIENT
Start: 2023-09-28 | End: 2023-09-30 | Stop reason: HOSPADM

## 2023-09-28 RX ORDER — MORPHINE SULFATE 2 MG/ML
2 INJECTION, SOLUTION INTRAMUSCULAR; INTRAVENOUS ONCE AS NEEDED
Status: DISCONTINUED | OUTPATIENT
Start: 2023-09-28 | End: 2023-09-30 | Stop reason: HOSPADM

## 2023-09-28 RX ORDER — MAGNESIUM HYDROXIDE 1200 MG/15ML
LIQUID ORAL AS NEEDED
Status: DISCONTINUED | OUTPATIENT
Start: 2023-09-28 | End: 2023-09-28 | Stop reason: HOSPADM

## 2023-09-28 RX ORDER — LIDOCAINE HYDROCHLORIDE AND EPINEPHRINE BITARTRATE 20; .01 MG/ML; MG/ML
INJECTION, SOLUTION SUBCUTANEOUS AS NEEDED
Status: DISCONTINUED | OUTPATIENT
Start: 2023-09-28 | End: 2023-09-28 | Stop reason: SURG

## 2023-09-28 RX ORDER — PRENATAL VIT/IRON FUM/FOLIC AC 27MG-0.8MG
1 TABLET ORAL DAILY
Status: DISCONTINUED | OUTPATIENT
Start: 2023-09-28 | End: 2023-09-30 | Stop reason: HOSPADM

## 2023-09-28 RX ORDER — ONDANSETRON 4 MG/1
4 TABLET, FILM COATED ORAL ONCE AS NEEDED
Status: DISCONTINUED | OUTPATIENT
Start: 2023-09-28 | End: 2023-09-30 | Stop reason: HOSPADM

## 2023-09-28 RX ORDER — METHYLERGONOVINE MALEATE 0.2 MG/ML
200 INJECTION INTRAVENOUS ONCE AS NEEDED
Status: DISCONTINUED | OUTPATIENT
Start: 2023-09-28 | End: 2023-09-30 | Stop reason: HOSPADM

## 2023-09-28 RX ORDER — KETOROLAC TROMETHAMINE 30 MG/ML
30 INJECTION, SOLUTION INTRAMUSCULAR; INTRAVENOUS ONCE
Status: COMPLETED | OUTPATIENT
Start: 2023-09-28 | End: 2023-09-28

## 2023-09-28 RX ORDER — OXYTOCIN/0.9 % SODIUM CHLORIDE 30/500 ML
125 PLASTIC BAG, INJECTION (ML) INTRAVENOUS CONTINUOUS PRN
Status: DISCONTINUED | OUTPATIENT
Start: 2023-09-28 | End: 2023-09-30 | Stop reason: HOSPADM

## 2023-09-28 RX ORDER — SODIUM CHLORIDE 0.9 % (FLUSH) 0.9 %
10 SYRINGE (ML) INJECTION EVERY 12 HOURS SCHEDULED
Status: DISCONTINUED | OUTPATIENT
Start: 2023-09-28 | End: 2023-09-28 | Stop reason: HOSPADM

## 2023-09-28 RX ORDER — SODIUM CHLORIDE 9 MG/ML
40 INJECTION, SOLUTION INTRAVENOUS AS NEEDED
Status: DISCONTINUED | OUTPATIENT
Start: 2023-09-28 | End: 2023-09-28 | Stop reason: HOSPADM

## 2023-09-28 RX ORDER — OXYTOCIN 10 [USP'U]/ML
INJECTION, SOLUTION INTRAMUSCULAR; INTRAVENOUS AS NEEDED
Status: DISCONTINUED | OUTPATIENT
Start: 2023-09-28 | End: 2023-09-28 | Stop reason: SURG

## 2023-09-28 RX ORDER — PROMETHAZINE HYDROCHLORIDE 12.5 MG/1
12.5 SUPPOSITORY RECTAL EVERY 6 HOURS PRN
Status: DISCONTINUED | OUTPATIENT
Start: 2023-09-28 | End: 2023-09-30 | Stop reason: HOSPADM

## 2023-09-28 RX ADMIN — OXYTOCIN 20 UNITS: 10 INJECTION INTRAVENOUS at 06:45

## 2023-09-28 RX ADMIN — SODIUM CHLORIDE, POTASSIUM CHLORIDE, SODIUM LACTATE AND CALCIUM CHLORIDE 125 ML/HR: 600; 310; 30; 20 INJECTION, SOLUTION INTRAVENOUS at 03:12

## 2023-09-28 RX ADMIN — SODIUM CHLORIDE, POTASSIUM CHLORIDE, SODIUM LACTATE AND CALCIUM CHLORIDE: 600; 310; 30; 20 INJECTION, SOLUTION INTRAVENOUS at 06:26

## 2023-09-28 RX ADMIN — FAMOTIDINE 20 MG: 10 INJECTION INTRAVENOUS at 05:44

## 2023-09-28 RX ADMIN — BUPIVACAINE HYDROCHLORIDE 10 ML: 2.5 INJECTION, SOLUTION EPIDURAL; INFILTRATION; INTRACAUDAL; PERINEURAL at 06:29

## 2023-09-28 RX ADMIN — SODIUM CITRATE AND CITRIC ACID MONOHYDRATE 30 ML: 500; 334 SOLUTION ORAL at 05:44

## 2023-09-28 RX ADMIN — KETOROLAC TROMETHAMINE 30 MG: 30 INJECTION, SOLUTION INTRAMUSCULAR; INTRAVENOUS at 14:01

## 2023-09-28 RX ADMIN — SODIUM CHLORIDE 500 MG: 900 INJECTION, SOLUTION INTRAVENOUS at 05:43

## 2023-09-28 RX ADMIN — CEFAZOLIN SODIUM 2 G: 2 SOLUTION INTRAVENOUS at 06:29

## 2023-09-28 RX ADMIN — KETOROLAC TROMETHAMINE 15 MG: 30 INJECTION, SOLUTION INTRAMUSCULAR at 07:27

## 2023-09-28 RX ADMIN — ONDANSETRON 4 MG: 2 INJECTION INTRAMUSCULAR; INTRAVENOUS at 05:50

## 2023-09-28 RX ADMIN — FENTANYL CITRATE 100 MCG: 50 INJECTION INTRAMUSCULAR; INTRAVENOUS at 06:29

## 2023-09-28 RX ADMIN — OXYTOCIN 20 UNITS: 10 INJECTION INTRAVENOUS at 07:18

## 2023-09-28 RX ADMIN — LIDOCAINE HYDROCHLORIDE AND EPINEPHRINE 3 ML: 20; 10 INJECTION, SOLUTION INFILTRATION; PERINEURAL at 06:29

## 2023-09-28 RX ADMIN — SODIUM BICARBONATE 1 ML: 84 INJECTION, SOLUTION INTRAVENOUS at 06:29

## 2023-09-28 RX ADMIN — ACETAMINOPHEN 1000 MG: 500 TABLET, FILM COATED ORAL at 22:47

## 2023-09-28 RX ADMIN — SODIUM CHLORIDE, POTASSIUM CHLORIDE, SODIUM LACTATE AND CALCIUM CHLORIDE: 600; 310; 30; 20 INJECTION, SOLUTION INTRAVENOUS at 06:45

## 2023-09-28 RX ADMIN — ACETAMINOPHEN 1000 MG: 500 TABLET ORAL at 05:44

## 2023-09-28 RX ADMIN — ACETAMINOPHEN 1000 MG: 500 TABLET, FILM COATED ORAL at 16:31

## 2023-09-28 NOTE — PAYOR COMM NOTE
"TO:[employer]  FROM:MEENAKSHI NAVARRO, RN PHONE [phone]  INPT CLINICALS    Carol Vega (23 y.o. Female)       Date of Birth   2000    Social Security Number       Address   Jesika MARTIN [address] [place], [place] [address]    Home Phone   [phone]    MRN   [id number]       Denominational   None    Marital Status   Single                            Admission Date   23    Admission Type   Elective    Admitting Provider   Eve Ricci MD    Attending Provider   Heather Joyner CNM    Department, Room/Bed   [facility] OB GYN, W2       Discharge Date       Discharge Disposition       Discharge Destination                                 Attending Provider: Heather Joyner CNM    Allergies: Iodine, Latex    Isolation: None   Infection: None   Code Status: CPR    Ht: 160 cm (63\")   Wt: 117 kg (258 lb 12.8 oz)    Admission Cmt: None   Principal Problem: None                  Active Insurance as of 2023       Primary Coverage       Payor Plan Insurance Group Employer/Plan Group    [employer] [place] [employer] [place]        Payor Plan Address Payor Plan Phone Number Payor Plan Fax Number Effective Dates    [address] [address] [phone]  2018 - None Entered    [facility] [id number]         Subscriber Name Subscriber Birth Date Member ID       CAROL VEGA 2000 [id number]                     Emergency Contacts        (Rel.) Home Phone Work Phone Mobile Phone    RandolphJoi (Mother) [phone] -- --    James Dickson (Father) [phone] -- --                 History & Physical        Ludwig Hernandez MD at 23 0551          GYNECOLOGY HISTORY AND PHYSICAL    CHIEF COMPLAINT: Scheduled surgery    DIAGNOSIS:  IUP at 39+2 weeks  Failed induction of labor  Prolonged rupture of membranes  BMI 46    ASSESSMENT/PLAN     23 y.o.  female who has failed an induction of labor.    - Proceed to the OR for  delivery  - Risks for the procedure reviewed  - " "SCDs for DVT ppx  - Antibiotics: Ancef 3g + Azithromycin 500 mg    HISTORY OF PRESENT ILLNESS     23 y.o.  female who has failed an induction of labor.    She has no complaints today and there are no interval changes to the history.    REVIEW OF SYSTEMS: A complete review of systems was performed and was specifically negative for headache, changes in vision, RUQ pain, shortness of breath, chest pain, lower extremity edema and dysuria.     HISTORY:  Obstetrical History:  OB History    Para Term  AB Living   1             SAB IAB Ectopic Molar Multiple Live Births                    # Outcome Date GA Lbr Dangelo/2nd Weight Sex Delivery Anes PTL Lv   1 Current                Past Medical History:  Past Medical History:   Diagnosis Date    Anxiety     Anxiety     Chlamydia     Depression     Endometriosis     Gonorrhea     Kidney stone     Major depression     Migraine     PMS (premenstrual syndrome)     PTSD (post-traumatic stress disorder)     Seizures     Self-injurious behavior     hx of cutting starting at age 15    Suicide attempt     3/25/19-overdose attempt, 2017 overdose attempt    Urinary tract infection        Past Surgical History:  Past Surgical History:   Procedure Laterality Date    ESOPHAGEAL DILATATION      TONSILLECTOMY         Social History:  Social History     Tobacco Use    Smoking status: Every Day     Types: Electronic Cigarette    Smokeless tobacco: Never    Tobacco comments:     vapes   Vaping Use    Vaping Use: Every day   Substance Use Topics    Alcohol use: Not Currently     Comment: \"I barely ever drink alcohol.\"    Drug use: Not Currently     Types: Marijuana     Comment: once monthly       Family History  Family History   Problem Relation Age of Onset    Rheum arthritis Mother     Depression Mother     Drug abuse Mother     Heart murmur Father     Drug abuse Father     Alcohol abuse Father         Allergies:   Allergies   Allergen Reactions    Iodine Hives    Latex Hives "       OBJECTIVE   VITALS:  Temp:  [96.8 °F (36 °C)-98.4 °F (36.9 °C)] 98.2 °F (36.8 °C)  Heart Rate:  [] 105  Resp:  [15-18] 16  BP: ()/(26-98) 122/72    PHYSICAL EXAM:  GENERAL: NAD, alert  CHEST: No increased work of breathing, CTAB  CV: RRR, WWP  ABDOMEN: Soft, NTTP  EXTREMITIES:  Warm and well-perfused, nontender, nonedematous  NEURO: AAO x 3, CN II-XII grossly intact    No current facility-administered medications on file prior to encounter.     Current Outpatient Medications on File Prior to Encounter   Medication Sig Dispense Refill    calcium gluconate 500 MG tablet Take 1 tablet by mouth 3 (Three) Times a Day Before Meals. 90 tablet 11    famotidine (PEPCID) 20 MG tablet Take 1 tablet by mouth 2 (Two) Times a Day. 60 tablet 5    ferrous sulfate 324 (65 Fe) MG tablet delayed-release EC tablet Take 1 tablet by mouth 2 (Two) Times a Day With Meals. 60 tablet 6    ondansetron ODT (ZOFRAN-ODT) 8 MG disintegrating tablet Place 1 tablet on the tongue Every 8 (Eight) Hours As Needed for Nausea or Vomiting. 30 tablet 0    prenatal vitamin (prenatal, CLASSIC, vitamin) tablet Take  by mouth Daily.      promethazine (PHENERGAN) 25 MG tablet Take 1 tablet by mouth Every 6 (Six) Hours As Needed for Nausea or Vomiting. 30 tablet 0    vitamin B-6 (PYRIDOXINE) 25 MG tablet Take 1 tablet by mouth Daily. 30 tablet 5       DIAGNOSTIC STUDIES:  Results from last 7 days   Lab Units 09/26/23  1637   WBC 10*3/mm3 13.78*   HEMOGLOBIN g/dL 9.8*   HEMATOCRIT % 29.6*   PLATELETS 10*3/mm3 264       No results found for this or any previous visit (from the past 24 hour(s)).    Ludwig Hernandez MD  Obstetrics and Gynecology  [facility]       Electronically signed by Ludwig Hernandez MD at 09/28/23 1395       Heather Joyner CNM at 09/26/23 6316       Attestation signed by Eve Ricci MD at 09/27/23 0893    I have reviewed this documentation and agree.                  AMRIT Medina  Gary  : 2000  MRN: [id number]  [facility]: [id number]    History and Physical    Chief Complaint   Patient presents with    Scheduled Induction      Subjective  Carol Vega is a 23 y.o. year old  with an Estimated Date of Delivery: 10/3/23 currently 39w0d presenting for induction of labor for elective at term per patient request.     Risks of labor induction including prolongation of labor, increased risks for both  section and operative vaginal birth have been discussed at length.     Prenatal care has been with [facility].  It has been complicated by nausea, GERD and Cholelithiasis. First PNV on 23 @ 6 weeks with 17 visits. Weight gain = 47 lbs.     OB History    Para Term  AB Living   1 0 0 0 0 0   SAB IAB Ectopic Molar Multiple Live Births   0 0 0 0 0 0      # Outcome Date GA Lbr Dangelo/2nd Weight Sex Delivery Anes PTL Lv   1 Current              Past Medical History:   Diagnosis Date    Anxiety     Anxiety     Chlamydia     Depression     Endometriosis     Gonorrhea     Kidney stone     Major depression     Migraine     PMS (premenstrual syndrome)     PTSD (post-traumatic stress disorder)     Seizures     Self-injurious behavior     hx of cutting starting at age 15    Suicide attempt     3/25/19-overdose attempt, 2017 overdose attempt    Urinary tract infection      Past Surgical History:   Procedure Laterality Date    ESOPHAGEAL DILATATION      TONSILLECTOMY         Current Facility-Administered Medications:     acetaminophen (TYLENOL) tablet 650 mg, 650 mg, Oral, Q4H PRN, Jenny Joynerh A, CNM    ePHEDrine Sulfate (Pressors) 5 MG/ML injection 5 mg, 5 mg, Intravenous, Q10 Min PRN, Heather Joyner, JOSÉ MIGUEL    fentaNYL 2mcg/mL and ropivacaine 0.2% in NS epidural 100 mL, 10 mL/hr, Epidural, Continuous, Heather Joyner A, JOSÉ MIGUEL    hydrOXYzine (ATARAX) tablet 50 mg, 50 mg, Oral, Nightly PRN, Jenny Joynerh A, CNM    lactated ringers bolus 1,000 mL, 1,000 mL, Intravenous, Once,  Heather Joyner A, CNM    lactated ringers bolus 1,000 mL, 1,000 mL, Intravenous, Once, Heather Joyner A, SABRINAM    lactated ringers infusion, 125 mL/hr, Intravenous, Continuous, Heather Joyner A, JOSÉ MIGUEL, Last Rate: 125 mL/hr at 09/26/23 1645, 125 mL/hr at 09/26/23 1645    lidocaine (XYLOCAINE) 1 % injection 0.5 mL, 0.5 mL, Intradermal, Once PRN, Heather Joyner, JOSÉ MIGUEL    mineral oil liquid 30 mL, 30 mL, Topical, Once, Heather Joyner A, JOSÉ MIGUEL    miSOPROStol (CYTOTEC) split tablet 25 mcg, 25 mcg, Vaginal, Q4H, Heather Joyner, SABRINAM, 25 mcg at 09/26/23 1715    Morphine sulfate (PF) injection 4 mg, 4 mg, Intravenous, Q4H PRN, Heather Joyner, SABRINAM    Morphine sulfate (PF) injection 6 mg, 6 mg, Intravenous, Q4H PRN, Heather Joyner, SABRINAM    ondansetron (ZOFRAN) injection 4 mg, 4 mg, Intravenous, Once PRN, Heather Joyner A, SABRINAM    ondansetron (ZOFRAN) tablet 4 mg, 4 mg, Oral, Q6H PRN **OR** ondansetron (ZOFRAN) injection 4 mg, 4 mg, Intravenous, Q6H PRN, Heather Joyner, ASBRINAM    oxytocin (PITOCIN) 30 units in 0.9% sodium chloride 500 mL (premix), 1-20 cheng-units/min, Intravenous, Titrated, Heather Joyner A, SABRINAM    promethazine (PHENERGAN) suppository 12.5 mg, 12.5 mg, Rectal, Q6H PRN **OR** promethazine (PHENERGAN) tablet 12.5 mg, 12.5 mg, Oral, Q6H PRN, Heather Joyner A, SABRINAM    Sod Citrate-Citric Acid (BICITRA) oral solution 30 mL, 30 mL, Oral, Once, Heather Joyner, JOSÉ MIGUEL    sodium chloride 0.9 % flush 10 mL, 10 mL, Intravenous, Q12H, Heather Joyner CNM    sodium chloride 0.9 % flush 10 mL, 10 mL, Intravenous, PRN, Heather Joyner CNM    sodium chloride 0.9 % infusion 40 mL, 40 mL, Intravenous, PRN, Heather Joyner CNM    Allergies   Allergen Reactions    Iodine Hives    Latex Hives     Social History    Tobacco Use      Smoking status: Every Day        Types: Electronic Cigarette      Smokeless tobacco: Never      Tobacco comments: vapes    Review of Systems   Constitutional: Negative.    Respiratory:  "Negative.     Cardiovascular: Negative.    Gastrointestinal: Negative.    Genitourinary: Negative.    Musculoskeletal: Negative.    Neurological: Negative.    Psychiatric/Behavioral: Negative.     All other systems reviewed and are negative.      Objective  /74 (BP Location: Right arm, Patient Position: Sitting)   Pulse 96   Temp 98.9 °F (37.2 °C) (Oral)   Resp 17   Ht 160 cm (63\")   Wt 117 kg (258 lb 12.8 oz)   LMP 12/27/2022   SpO2 99%   Breastfeeding Yes   BMI 45.84 kg/m²                                           General:  well developed; well nourished  no acute distress   Neck:    Heart:   supple and no masses  normal apical impulse  regular rate and rhythm   Lungs: breathing is unlabored   Abdomen: Gravid and nontender  EFW: 7.5-8#, growth scan @ 37 weeks =81%, AC 96%, posterior placenta   FHT's: reassuring, reactive, and category 1   Cervix: was checked (by me): 1 cm / 50 % / -3 posterior   Presentation: cephalic   Contractions: none   Extremities:   normal appearance with no cyanosis or edema and no calf tenderness   Skin:  Skin color, texture, turgor normal. No rashes or lesions or Skin warm and dry   Psych:  Normal. and Alert and oriented, appropriate affect.       Prenatal Labs  Lab Results   Component Value Date    HGB 9.8 (L) 09/26/2023    HEPBSAG Non-Reactive 02/26/2023    HEPBSAG Non-Reactive 02/26/2023    ABSCRN Negative 02/26/2023    SFD4DEL4 Non-Reactive 02/26/2023    HEPCVIRUSABY Non-Reactive 02/26/2023    HEPCVIRUSABY Non-Reactive 02/26/2023    STREPGPB Negative 09/06/2023    URINECX Final report 07/31/2023       Current Labs Reviewed   Lab Results (last 24 hours)       Procedure Component Value Units Date/Time    CBC & Differential [[id number]]  (Abnormal) Collected: 09/26/23 1637    Specimen: Blood Updated: 09/26/23 1717    Narrative:      The following orders were created for panel order CBC & Differential.  Procedure                               Abnormality         Status       "               ---------                               -----------         ------                     CBC Auto Differential[[id number]]        Abnormal            Final result                 Please view results for these tests on the individual orders.    CBC Auto Differential [[id number]]  (Abnormal) Collected: 23 163    Specimen: Blood Updated: 23     WBC 13.78 10*3/mm3      RBC 3.65 10*6/mm3      Hemoglobin 9.8 g/dL      Hematocrit 29.6 %      MCV 81.1 fL      MCH 26.8 pg      MCHC 33.1 g/dL      RDW 16.3 %      RDW-SD 47.3 fl      MPV 11.5 fL      Platelets 264 10*3/mm3      Neutrophil % 72.1 %      Lymphocyte % 17.8 %      Monocyte % 7.9 %      Eosinophil % 1.4 %      Basophil % 0.1 %      Immature Grans % 0.7 %      Neutrophils, Absolute 9.93 10*3/mm3      Lymphocytes, Absolute 2.45 10*3/mm3      Monocytes, Absolute 1.09 10*3/mm3      Eosinophils, Absolute 0.19 10*3/mm3      Basophils, Absolute 0.02 10*3/mm3      Immature Grans, Absolute 0.10 10*3/mm3      nRBC 0.0 /100 WBC                ASSESSMENT  IUP at 39w0d  Induction of labor because of elective at term per patient request  Group B strep status: negative  Reactive NST    PLAN  Admit to   Penny bulb attempted with speculum and unable to advance catheter through cervix due to angle. Attempted to insert catheter without speculum but unsuccessful. Tolerated by mom and baby.  Cytotec 25 mcg vaginally q 4hrs x 3 doses  Pitocin per protocol 4 hours after last Cytotec dose  All procedures explained to patient and partner. Questions answered and verbalized understanding.  Anticipate     Heather Joyner, APRN  2023  17:20 EDT         Electronically signed by Eve Ricci MD at 23 0815       Vital Signs (last day)       Date/Time Temp Temp src Pulse Resp BP Patient Position SpO2    23 1057 97.7 (36.5) Oral 87 17 106/68 Lying 98    23 1025 -- -- 92 16 119/73 Lying --    23 0957 97.7 (36.5) Oral 90 -- 116/72 Lying  95    09/28/23 0927 98.5 (36.9) Oral 94 17 108/77 Lying 95    09/28/23 0910 -- -- 95 -- -- -- 98 09/28/23 0905 -- -- 91 -- -- -- 98 09/28/23 0901 -- -- 87 -- 97/68 -- --    09/28/23 0900 -- -- 83 -- -- -- 98 09/28/23 0855 -- -- 83 -- -- -- 98 09/28/23 0850 -- -- 96 -- -- -- 99    09/28/23 0849 -- -- 96 -- -- -- 99 09/28/23 0846 -- -- 95 -- 113/65 -- --    09/28/23 0845 -- -- 82 -- -- -- 98 09/28/23 0840 -- -- 85 -- -- -- 99 09/28/23 0839 -- -- 85 -- -- -- 99 09/28/23 0835 -- -- 87 -- -- -- 98 09/28/23 0831 -- -- 86 -- 101/29 -- --    09/28/23 0830 -- -- 81 -- -- -- 99 09/28/23 0825 -- -- 87 -- -- -- 99 09/28/23 0820 -- -- 85 -- -- -- 99 09/28/23 0818 -- -- 85 -- 107/48 -- --    09/28/23 0815 -- -- 94 -- -- -- 99 09/28/23 0810 -- -- 85 -- -- -- 99 09/28/23 0805 -- -- 84 -- -- -- 99 09/28/23 0804 -- -- 84 -- -- -- 99 09/28/23 0802 -- -- 83 -- 102/48 -- --    09/28/23 0800 -- -- 83 -- 102/50 -- 99    09/28/23 0757 -- -- 81 -- 102/50 -- --    09/28/23 0755 -- -- 85 -- -- -- 99    09/28/23 0753 -- -- 85 -- 101/55 -- --    09/28/23 0750 -- -- 89 -- -- -- 99    09/28/23 0746 97.6 (36.4) Oral 89 16 102/53 Lying --    09/28/23 0601 -- -- 102 -- 113/46 -- --    09/28/23 0531 -- -- 105 -- 129/87 -- --    09/28/23 0516 -- -- 97 -- 116/70 -- --    09/28/23 0501 98.2 (36.8) Temporal 103 16 122/77 Sitting --    09/28/23 0446 -- -- 100 -- 117/70 -- --    09/28/23 0431 -- -- 95 -- 112/51 -- --    09/28/23 0416 -- -- 96 -- 101/46 -- --    09/28/23 0415 -- -- 96 -- 101/46 -- --    09/28/23 0402 -- -- 105 -- 103/57 -- --    09/28/23 0355 -- -- 110 -- -- -- 99    09/28/23 0350 -- -- 93 -- -- -- 98    09/28/23 0346 -- -- 93 -- 123/67 -- --    09/28/23 0345 -- -- 96 -- -- -- 98    09/28/23 0340 -- -- 97 -- -- -- 97    09/28/23 0339 -- -- -- -- -- -- 97    09/28/23 0335 -- -- 96 -- -- -- 99    09/28/23 0331 -- -- 98 -- 114/50 -- --    09/28/23 0330 -- -- 96 -- -- -- 99    09/28/23 0325 -- --  96 -- -- -- 99 09/28/23 0320 -- -- 94 -- -- -- 99 09/28/23 0316 -- -- 92 -- 114/50 -- --    09/28/23 0315 -- -- 93 -- 114/50 -- 100 09/28/23 0312 98.2 (36.8) Temporal -- 18 -- -- --    09/28/23 0310 -- -- 94 -- -- -- 100 09/28/23 0305 -- -- 91 -- -- -- 100 09/28/23 0301 -- -- 92 -- 111/54 -- --    09/28/23 0300 -- -- 89 -- -- -- 100 09/28/23 0255 -- -- 92 -- -- -- 100 09/28/23 0254 -- -- 92 -- -- -- 100 09/28/23 0250 -- -- 93 -- -- -- 100 09/28/23 0246 -- -- 91 -- 122/58 -- --    09/28/23 0245 -- -- 87 -- -- -- 100 09/28/23 0240 -- -- 95 -- -- -- 100 09/28/23 0235 -- -- 90 -- -- -- 100 09/28/23 0231 -- -- 92 -- 123/64 -- --    09/28/23 0230 -- -- 92 -- 123/64 -- 100 09/28/23 0220 -- -- 101 -- -- -- 100 09/28/23 0216 -- -- 94 -- 116/81 -- --    09/28/23 0215 -- -- 91 -- -- -- 100 09/28/23 0210 -- -- 94 -- -- -- 100 09/28/23 0205 -- -- 90 -- -- -- 100 09/28/23 0201 -- -- 94 -- 98/63 -- --    09/28/23 0200 -- -- 92 -- -- -- 100 09/28/23 0155 -- -- 95 -- -- -- 100 09/28/23 0150 -- -- 92 -- -- -- 100 09/28/23 0146 -- -- 92 -- 136/82 -- --    09/28/23 0145 -- -- 97 -- -- -- 100 09/28/23 0135 97.6 (36.4) Temporal 108 -- -- -- 100 09/28/23 0131 -- -- 109 -- 122/72 -- --    09/28/23 0130 -- -- 90 -- -- -- 99 09/28/23 0125 -- -- 86 -- -- -- 99 09/28/23 0120 -- -- 88 -- -- -- 99 09/28/23 0116 -- -- 89 -- 114/68 -- --    09/28/23 0115 -- -- 90 -- -- -- 100 09/28/23 0114 -- -- 90 -- -- -- 100 09/28/23 0110 -- -- 89 -- -- -- 100 09/28/23 0105 -- -- 88 -- -- -- 100 09/28/23 0101 -- -- 86 -- 114/60 -- --    09/28/23 0100 -- -- 88 -- -- -- 100 09/28/23 0055 -- -- 87 -- -- -- 100 09/28/23 0050 -- -- 86 -- -- -- 100 09/28/23 0046 -- -- 87 -- 115/75 -- --    09/28/23 0045 -- -- 82 -- -- -- 89    09/28/23 0040 -- -- 98 -- -- -- 100 09/28/23 0037 -- -- 86 -- -- -- --    09/28/23 0035 -- -- -- -- -- -- 99 09/28/23 0034 -- -- -- -- -- --  99    09/28/23 0031 -- -- 87 -- 107/57 -- --    09/28/23 0030 -- -- 84 -- -- -- 99 09/28/23 0025 -- -- 88 -- -- -- 100 09/28/23 0020 -- -- 87 -- -- -- 100 09/28/23 0016 -- -- 91 -- 124/59 -- --    09/28/23 0015 -- -- 88 -- -- -- 100 09/28/23 0014 -- -- 88 -- -- -- 100 09/28/23 0010 -- -- 90 -- -- -- 100 09/28/23 0005 -- -- 86 -- -- -- 100 09/28/23 0001 -- -- 210 -- 107/77 -- --    09/28/23 0000 -- -- 104 -- -- -- 100 09/27/23 2355 -- -- 97 -- -- -- 100 09/27/23 2350 -- -- 101 -- -- -- 100 09/27/23 2349 -- -- 101 -- -- -- 100 09/27/23 2347 -- -- 93 -- 111/52 -- --    09/27/23 2345 -- -- 98 -- -- -- 100 09/27/23 2340 -- -- 90 -- -- -- 100 09/27/23 2335 -- -- 95 -- -- -- 100 09/27/23 2331 -- -- 99 -- 123/61 -- --    09/27/23 2330 96.8 (18) Temporal 95 16 -- -- 100 09/27/23 2329 -- -- 95 -- -- -- 100 09/27/23 2325 -- -- 98 -- -- -- 100 09/27/23 2320 -- -- 106 -- -- -- 100 09/27/23 2316 -- -- 96 -- 125/79 -- --    09/27/23 2315 -- -- 95 -- -- -- 100 09/27/23 2310 -- -- 91 -- -- -- 100 09/27/23 2305 -- -- 87 -- -- -- 100 09/27/23 2304 -- -- 87 -- -- -- 100 09/27/23 2301 -- -- 102 -- -- -- --    09/27/23 2300 -- -- 95 -- 116/74 -- 100 09/27/23 2255 -- -- 126 -- -- -- 98 09/27/23 2250 -- -- 100 -- -- -- 100 09/27/23 2247 -- -- 92 -- 102/81 -- --    09/27/23 2245 -- -- 101 -- -- -- 100 09/27/23 2244 -- -- 101 -- -- -- 100 09/27/23 2240 -- -- 87 -- -- -- 100 09/27/23 2235 -- -- 86 -- -- -- 100 09/27/23 2231 -- -- 87 -- 104/64 -- --    09/27/23 2230 -- -- 89 -- -- -- 100 09/27/23 2225 -- -- 85 -- -- -- 100 09/27/23 2220 -- -- 80 -- -- -- 100 09/27/23 2219 -- -- 80 -- -- -- 100 09/27/23 2216 -- -- 83 -- 116/59 -- --    09/27/23 2215 -- -- 88 -- -- -- 100 09/27/23 2210 -- -- 86 -- -- -- 100 09/27/23 2205 -- -- 78 -- -- -- 100 09/27/23 2201 -- -- 84 -- 116/70 -- --    09/27/23 2200 -- -- 80 -- -- -- 100 09/27/23 2159 --  -- 80 -- -- -- 100 09/27/23 2155 -- -- 81 -- -- -- 100 09/27/23 2150 -- -- 75 -- -- -- 100 09/27/23 2146 -- -- 75 -- 117/58 -- --    09/27/23 2145 -- -- 76 -- -- -- 100 09/27/23 2140 -- -- 83 -- -- -- 100 09/27/23 2135 -- -- 76 -- -- -- 100 09/27/23 2134 -- -- 76 -- -- -- 100 09/27/23 2131 -- -- 76 -- 116/56 -- --    09/27/23 2130 -- -- 72 -- -- -- 99 09/27/23 2125 -- -- 72 -- -- -- 100 09/27/23 2120 -- -- 74 -- -- -- 100 09/27/23 2116 -- -- 69 -- 104/52 -- --    09/27/23 2115 -- -- 80 -- -- -- 100 09/27/23 2114 -- -- 80 -- -- -- 100 09/27/23 2109 -- -- 72 -- -- -- 98 09/27/23 2105 -- -- 82 -- -- -- 100 09/27/23 2101 -- -- 69 -- 101/51 -- --    09/27/23 2100 98 (36.7) Temporal 76 15 -- Lying 100 09/27/23 2055 -- -- 70 -- -- -- 100 09/27/23 2050 -- -- 71 -- -- -- 100 09/27/23 2046 -- -- 70 -- 99/48 -- --    09/27/23 2045 -- -- 73 -- -- -- 100 09/27/23 2040 -- -- 77 -- -- -- 99 09/27/23 2035 -- -- 72 -- -- -- 99 09/27/23 2030 -- -- 80 -- -- -- 100 09/27/23 2025 -- -- 79 -- -- -- 100 09/27/23 2020 -- -- 72 -- -- -- 100 09/27/23 2016 -- -- 83 -- 138/66 -- --    09/27/23 2015 -- -- 71 -- -- -- 100 09/27/23 2010 -- -- 75 -- -- -- 99    09/27/23 2005 -- -- 74 -- -- -- 100 09/27/23 2001 -- -- 77 -- 124/68 -- --    09/27/23 2000 -- -- 73 -- -- -- 100 09/27/23 1955 -- -- 73 -- -- -- 100 09/27/23 1950 -- -- 80 -- -- -- 100 09/27/23 1949 -- -- 80 -- -- -- 100 09/27/23 1946 -- -- 78 -- 128/75 -- --    09/27/23 1945 -- -- 81 -- -- -- 100 09/27/23 1940 -- -- 86 -- -- -- 100 09/27/23 1935 -- -- 77 -- -- -- 100 09/27/23 1931 -- -- 82 -- 125/68 -- --    09/27/23 1930 -- -- 76 -- -- -- 100 09/27/23 1929 -- -- 76 -- -- -- 100 09/27/23 1925 -- -- 81 -- -- -- 100 09/27/23 1920 -- -- 79 -- -- -- 100 09/27/23 1916 -- -- 80 -- -- -- --    09/27/23 1915 98.4 (36.9) Temporal 77 16 121/66 Lying 100 09/27/23 1910 -- -- 81 -- -- -- 100     09/27/23 1901 -- -- 85 -- -- -- --    09/27/23 1900 -- -- 81 -- 117/72 -- 100 09/27/23 1850 -- -- 86 -- -- -- --    09/27/23 1846 -- -- 82 -- 122/70 -- --    09/27/23 1845 -- -- 88 -- -- -- --    09/27/23 1840 -- -- 85 -- -- -- --    09/27/23 1835 -- -- 82 -- -- -- --    09/27/23 1831 -- -- 82 -- 108/70 -- --    09/27/23 1830 -- -- 84 -- -- -- --    09/27/23 1829 -- -- 84 -- -- -- --    09/27/23 1825 -- -- 83 -- -- -- --    09/27/23 1820 -- -- 81 -- -- -- --    09/27/23 1816 -- -- 87 -- 129/86 -- --    09/27/23 1815 -- -- 80 -- -- -- 100 09/27/23 1810 -- -- 78 -- -- -- 100 09/27/23 1805 -- -- 77 -- -- -- 100 09/27/23 1803 97.7 (36.5) -- 82 -- 111/48 -- --    09/27/23 1800 -- -- 80 -- -- -- 100 09/27/23 1755 -- -- 69 -- -- -- 100 09/27/23 1750 -- -- 70 -- -- -- 100 09/27/23 1746 -- -- 69 -- 117/65 -- --    09/27/23 1745 -- -- 68 -- -- -- 100 09/27/23 1740 -- -- 83 -- -- -- 99 09/27/23 1735 -- -- 73 -- -- -- 100 09/27/23 1731 -- -- 77 -- 110/67 -- --    09/27/23 1730 -- -- 67 -- -- -- 100 09/27/23 1729 -- -- -- -- -- -- 100 09/27/23 1725 -- -- 67 -- -- -- 100 09/27/23 1720 -- -- 64 -- -- -- 100 09/27/23 1716 -- -- 72 -- 121/46 -- --    09/27/23 1715 -- -- 64 -- -- -- 100 09/27/23 1710 -- -- 82 -- -- -- 100 09/27/23 1705 -- -- 73 -- -- -- 100 09/27/23 1701 -- -- 82 -- 116/54 -- --    09/27/23 1700 -- -- 87 -- -- -- 100 09/27/23 1655 -- -- 75 -- -- -- 100 09/27/23 1650 -- -- 75 -- -- -- 100 09/27/23 1646 -- -- 164 -- 116/95 -- --    09/27/23 1645 -- -- 79 -- -- -- 100 09/27/23 1640 -- -- 77 -- -- -- 99 09/27/23 1635 -- -- 78 -- -- -- 100 09/27/23 1631 -- -- 77 -- 112/63 -- --    09/27/23 1630 -- -- 76 -- -- -- 99 09/27/23 1625 -- -- 76 -- -- -- 99    09/27/23 1620 -- -- 75 -- -- -- 100    09/27/23 1616 -- -- 73 -- 114/67 -- --    09/27/23 1615 -- -- 75 -- 114/67 -- 98    09/27/23 1610 -- -- 79 -- -- -- 98 09/27/23 1605 -- -- 87 -- -- -- 99     09/27/23 1601 -- -- 83 -- 109/59 -- --    09/27/23 1600 -- -- 83 -- -- -- 100 09/27/23 1555 97.9 (36.6) -- 79 -- -- -- 99 09/27/23 1550 -- -- 83 -- -- -- 99    09/27/23 1546 -- -- 84 -- 94/51 -- --    09/27/23 1545 -- -- 95 -- -- -- 100 09/27/23 1540 -- -- 84 -- -- -- 100    09/27/23 1533 -- -- 103 -- 101/34 -- --    09/27/23 1525 -- -- 103 -- -- -- 99 09/27/23 1520 -- -- 86 -- -- -- 99 09/27/23 1516 -- -- 87 -- 115/58 -- --    09/27/23 1515 -- -- 82 -- -- -- 100 09/27/23 1511 -- -- 74 -- 117/56 -- --    09/27/23 1510 -- -- 87 -- -- -- 100 09/27/23 1506 -- -- 89 -- 117/74 -- --    09/27/23 1501 -- -- 90 -- 153/98 -- --    09/27/23 1455 -- -- 70 -- -- -- 98    09/27/23 1446 -- -- 71 -- 138/90 -- --    09/27/23 1445 -- -- 83 -- -- -- 100 09/27/23 1440 -- -- 76 -- -- -- 100 09/27/23 1435 -- -- 72 -- -- -- 100 09/27/23 1434 -- -- 72 -- -- -- 100 09/27/23 1431 -- -- 100 -- 112/91 -- --    09/27/23 1430 -- -- 83 -- -- -- 100 09/27/23 1425 -- -- 72 -- -- -- 100 09/27/23 1410 -- -- 86 -- -- -- 100    09/27/23 1405 -- -- 81 -- -- -- 100    09/27/23 1401 -- -- 84 -- 125/84 -- --    09/27/23 1400 -- -- 79 -- -- -- 100    09/27/23 1359 -- -- 79 -- -- -- 100    09/27/23 1346 98 (36.7) -- 82 -- 131/85 -- --    09/27/23 1331 -- -- 78 -- 136/92 -- --    09/27/23 1316 -- -- 71 -- 121/88 -- --    09/27/23 1301 -- -- 88 -- 119/74 -- --    09/27/23 1255 -- -- 121 -- 114/88 -- --    09/27/23 1231 -- -- 76 -- 129/59 -- --    09/27/23 1216 -- -- 79 -- 111/49 -- --    09/27/23 1215 98 (36.7) -- 79 -- 111/49 -- --    09/27/23 1201 -- -- 86 -- 125/65 -- --    09/27/23 1146 -- -- 72 -- 107/58 -- --    09/27/23 1131 -- -- 74 -- 110/47 -- --    09/27/23 1116 -- -- 86 -- 126/76 -- --    09/27/23 1046 -- -- 78 -- 118/72 -- --    09/27/23 1031 -- -- 90 -- 120/72 -- --    09/27/23 1016 -- -- 76 -- 121/67 -- --    09/27/23 1001 -- -- 79 -- 118/82 -- --    09/27/23 0946 -- -- 87 -- 100/53 -- --    09/27/23 0931  -- -- 107 -- 92/76 -- --    09/27/23 0921 -- -- 87 -- 106/85 -- --    09/27/23 0831 -- -- 80 -- 106/63 -- --    09/27/23 0816 97.8 (36.6) -- 79 -- 95/55 -- --    09/27/23 0802 -- -- 76 -- 98/26 -- --    09/27/23 0716 -- -- 77 -- 128/76 -- --    09/27/23 0700 -- -- 83 -- 119/89 -- --    09/27/23 0645 -- -- 89 -- 102/77 -- --    09/27/23 0630 -- -- 87 -- 114/87 -- --    09/27/23 0615 -- -- 87 -- 132/74 -- --    09/27/23 0610 -- -- 95 -- 127/75 -- --    09/27/23 0545 -- -- 77 -- 101/66 -- --    09/27/23 0530 -- -- 95 -- 75/45 -- --    09/27/23 0515 -- -- 87 -- 116/55 -- --    09/27/23 0500 -- -- 79 -- 107/48 -- --    09/27/23 0445 -- -- 81 -- 104/40 -- --    09/27/23 0430 -- -- 83 -- 107/69 -- --    09/27/23 0415 97.9 (36.6) Oral 85 -- 112/47 -- --    09/27/23 0345 -- -- 86 -- 122/74 -- --    09/27/23 0330 -- -- 89 -- 127/70 -- --    09/27/23 0315 -- -- 77 -- 122/71 -- --    09/27/23 0300 -- -- 93 -- 118/63 -- --    09/27/23 0245 -- -- 94 -- 115/59 -- --    09/27/23 0230 -- -- 85 -- 109/53 -- --    09/27/23 0215 -- -- 83 -- 100/51 -- --    09/27/23 0200 -- -- 88 -- 100/53 -- --    09/27/23 0145 -- -- 88 -- 114/60 -- --    09/27/23 0130 -- -- 79 -- 110/53 -- --    09/27/23 0115 -- -- 89 -- 130/59 -- --    09/27/23 0100 -- -- 90 -- 124/68 -- --    09/27/23 0045 -- -- 91 -- 121/56 -- --    09/27/23 0030 -- -- 81 -- 120/65 -- --    09/27/23 0015 -- -- 84 -- 120/61 -- --    09/27/23 0000 -- -- 85 -- 107/51 -- --          Current Facility-Administered Medications   Medication Dose Route Frequency Provider Last Rate Last Admin    acetaminophen (TYLENOL) tablet 1,000 mg  1,000 mg Oral Q8H Ludwig Hernandez MD        acetaminophen (TYLENOL) tablet 650 mg  650 mg Oral Once PRN Eve Ricci MD        carboprost (HEMABATE) injection 250 mcg  250 mcg Intramuscular Q15 Min PRN Ludwig Hernandez MD        carboprost (HEMABATE) injection 250 mcg  250 mcg Intramuscular PRN Eve Ricci MD        [START ON 9/29/2023] Enoxaparin  Sodium (LOVENOX) syringe 40 mg  40 mg Subcutaneous Q12H Ludwig Hernandez MD        Hydrocortisone (Perianal) (ANUSOL-HC) 2.5 % rectal cream   Rectal TID PRN Ludwig Hernandez MD        hydrOXYzine (ATARAX) tablet 50 mg  50 mg Oral Q6H PRN Ludwig Hernandez MD        ibuprofen (ADVIL,MOTRIN) tablet 800 mg  800 mg Oral Q8H Ludwig Hernandez MD        influenza vac split quad (FLUZONE,FLUARIX,AFLURIA,FLULAVAL) injection 0.5 mL  0.5 mL Intramuscular Once PRN Ludwig Hernandez MD        ketorolac (TORADOL) injection 30 mg  30 mg Intravenous Once Ludwig Hernandez MD        Measles, Mumps & Rubella Vac (MMR) injection 0.5 mL  0.5 mL Subcutaneous Once PRN Ludwig Hernandez MD        methylergonovine (METHERGINE) injection 200 mcg  200 mcg Intramuscular Once PRN Ludwig Hernandez MD        methylergonovine (METHERGINE) injection 200 mcg  200 mcg Intramuscular Once PRN Eve Ricci MD        miSOPROStol (CYTOTEC) tablet 800 mcg  800 mcg Rectal Once PRN Ludwig Hernandez MD        miSOPROStol (CYTOTEC) tablet 800 mcg  800 mcg Rectal PRN Eve Ricci MD        Morphine sulfate (PF) injection 2 mg  2 mg Intravenous Once PRN Eve Ricci MD        Morphine sulfate (PF) injection 4 mg  4 mg Intravenous Once PRN Eve Ricci MD        nalbuphine (NUBAIN) injection 2.5 mg  2.5 mg Intravenous Q4H PRN Felix Joyner CRNA        naloxone (NARCAN) injection 0.4 mg  0.4 mg Intravenous Q1H PRN Felix Joyner CRNA        nicotine (NICODERM CQ) 21 MG/24HR patch 1 patch  1 patch Transdermal Q24H Ludwig Hernandez MD        nicotine (NICODERM CQ) 21 MG/24HR patch 1 patch  1 patch Transdermal Q24H Ludwig Hernandez MD        nicotine polacrilex (NICORETTE) gum 2 mg  2 mg Mouth/Throat Q1H PRN Ludwig Hernandez MD        nicotine polacrilex (NICORETTE) gum 2 mg  2 mg Mouth/Throat Q1H PRLudwig aPulino MD        nicotine polacrilex (NICORETTE) gum 2 mg  2 mg Mouth/Throat Q1H PRBIANCA Hernandez  Ludwig Lira MD        ondansetron (ZOFRAN) injection 4 mg  4 mg Intravenous Once PRN eFlix Joyner CRNA        ondansetron (ZOFRAN) injection 4 mg  4 mg Intravenous Q6H PRN Ludwig Hernandez MD        ondansetron (ZOFRAN) tablet 4 mg  4 mg Oral Once PRN Eve Ricci MD        Or    ondansetron (ZOFRAN) injection 4 mg  4 mg Intravenous Once PRN Eve Ricci MD        ondansetron (ZOFRAN) tablet 4 mg  4 mg Oral Q8H PRN Ludwig Hernandez MD        oxyCODONE (ROXICODONE) immediate release tablet 5 mg  5 mg Oral Q4H PRN Ludwig Hernandez MD        Or    oxyCODONE (ROXICODONE) immediate release tablet 10 mg  10 mg Oral Q4H PRN Ludwig Hernandez MD        oxytocin (PITOCIN) 30 units in 0.9% sodium chloride 500 mL (premix)  999 mL/hr Intravenous Once Ludwig Hernandez MD        Followed by    oxytocin (PITOCIN) 30 units in 0.9% sodium chloride 500 mL (premix)  250 mL/hr Intravenous Continuous Ludwig Hernandez MD        oxytocin (PITOCIN) 30 units in 0.9% sodium chloride 500 mL (premix)  125 mL/hr Intravenous Continuous PRN Ludwig Hernandez MD        oxytocin (PITOCIN) 30 units in 0.9% sodium chloride 500 mL (premix)  999 mL/hr Intravenous Once Eve Ricci MD        Followed by    oxytocin (PITOCIN) 30 units in 0.9% sodium chloride 500 mL (premix)  250 mL/hr Intravenous Continuous Eve Ricci MD        prenatal vitamin tablet 1 tablet  1 tablet Oral Daily Ludwig Hernandez MD        promethazine (PHENERGAN) tablet 25 mg  25 mg Oral Q6H PRN Ludwig Hernandez MD        Or    promethazine (PHENERGAN) suppository 12.5 mg  12.5 mg Rectal Q6H PRN Ludwig Hernandez MD        simethicone (MYLICON) chewable tablet 80 mg  80 mg Oral 4x Daily PRN Ludwig Hernandez MD         Operative/Procedure Notes (last 24 hours)  Notes from 09/27/23 1254 through 09/28/23 1254   No notes of this type exist for this encounter.          Physician Progress Notes (last 48 hours)        Ludwig Hernandez,  MD at 09/27/23 2028          IUPC placed without difficulty, mother and baby tolerated the procedure well, no complications  SVE 3.5/90/-2, anterior  Progressive Pitocin with likely pitocin break tonight  Epidural in place    Ludwig Hernandez MD  Obstetrics and Gynecology  [facility]      Electronically signed by Ludwig Hernandez MD at 09/27/23 2029       Ludwig Hernandez MD at 09/27/23 1238          SVE 2.5/50/-3  AROM at 1230 with a small amount of clear fluid returned, mother and baby tolerated the procedure well, no complications  Progressive pitocin  Epidural prn    Ludwig Hernandez MD  Obstetrics and Gynecology  [facility]      Electronically signed by Ludwig Hernandez MD at 09/27/23 1240       Ludwig Hernandez MD at 09/27/23 0837          SVE 2/50/-3, posterior  Irregular contractions  I attempted another FB but it would not stay in place  Too early for AROM  Continue with pitocin for now  Epidural prn    Ludwig Hernandez MD  Obstetrics and Gynecology  [facility]      Electronically signed by Ludwig Hernandez MD at 09/27/23 0904       Consult Notes (last 48 hours)  Notes from 09/26/23 1255 through 09/28/23 1255   No notes of this type exist for this encounter.

## 2023-09-28 NOTE — ANESTHESIA POSTPROCEDURE EVALUATION
Patient: Carol Vega    Procedure Summary       Date: 23 Room / Location: [facility] OR 1 /  [facility]    Anesthesia Start: 1500 Anesthesia Stop: 23    Procedure:  SECTION PRIMARY (Abdomen) Diagnosis:     Surgeons: Ludwig Hernandez MD Provider: Perez Hough CRNA    Anesthesia Type: epidural ASA Status: 2 - Emergent            Anesthesia Type: epidural    Vitals  Vitals Value Taken Time   /77 23   Temp 98.5 °F (36.9 °C) 23   Pulse 94 23   Resp 17 23   SpO2 95 % 23           Post Anesthesia Care and Evaluation    Patient location during evaluation: bedside  Patient participation: complete - patient participated  Level of consciousness: awake and alert  Pain score: 0  Pain management: satisfactory to patient    Airway patency: patent  Anesthetic complications: No anesthetic complications  PONV Status: none  Cardiovascular status: acceptable and stable  Respiratory status: acceptable  Hydration status: acceptable  Post Neuraxial Block status: Motor and sensory function returned to baseline and No signs or symptoms of PDPH  Comments: Vitals signs as noted in nursing documentation as per protocol.

## 2023-09-28 NOTE — PROGRESS NOTES
IUPC placed without difficulty, mother and baby tolerated the procedure well, no complications  SVE 3.5/90/-2, anterior  Progressive Pitocin with likely pitocin break tonight  Epidural in place    Ludwig Hernandez MD  Obstetrics and Gynecology  [facility]

## 2023-09-28 NOTE — SIGNIFICANT NOTE
09/27/23 2147   Labor Interventions   Labor Interventions oxytocin infusion discontinued     Increasing uterine resting tone, decrease FHR variability noted.  Pitocin stopped.
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
## 2023-09-28 NOTE — OP NOTE
Marty Vega  : 2000  MRN: [id number]  CSN: [id number]    Operative Report    Pre-Operative Dx:   IUP at 39w2d weeks   Failed induction of labor  Prolonged rupture of membranes  BMI 46      Postoperative dx:    Same     Procedure: Primary  (LTCS)       Surgeon:    Surgical assistant: SHANELLE Luna was responsible for performing the following activities: Retraction, Suction, Placing Dressing, and Delivery of Fetus and their skilled assistance was necessary for the success of this case.       OR Staff:   Circulator: Wilbert Underwood RN; Jocelyne Sahu RN; Mirna Garcia RN  Scrub Person: Radha Pat PCT; Patricia Wu; Jocelyn Lewis; Gretta Edmondson  Baby Nurse: Jose Khalil RN       Anesthesia: Epidural        EBL: 850 mls.       Antibiotics: cefazolin 3 gms and azithromycin 500 mgs     Drains:    Specimens: Penny    None     Findings: Normal appearing uterus, fallopian tubes and ovaries       Infant details        Infant observations: Weight: 3400 g (7 lb 7.9 oz)   Length: 19  in   Apgars: 8  @ 1 minute /    9  @ 5 minutes     Procedure Details:   The patient was taken to the operating room where regional anesthesia was found to be adequate. She was prepared and draped in the normal sterile fashion in the dorsal supine position with a leftward tilt. A Pfannenstiel skin incision was made with the scalpel and carried through to the underlying layer of fascia. The fascia was incised in the midline and the incision extended laterally with the Segal scissors. The superior aspect of the fascial incision was grasped with the Kocher clamps, elevated and the underlying rectus muscles dissected off sharply. Attention was then turned to the inferior aspect of the fascial incision, which was grasped and tented up with the Kocher clamps. The underlying muscles were dissected off in a similar fashion. The rectus muscles were then  in the midline,  and the peritoneum identified, and entered bluntly. The peritoneal incision was extended superiorly and inferiorly with good visualization of the bladder. The Arnie retractor was inserted atraumatically. The vesicouterine peritoneum was identified, grasped with the pickups, entered sharply, and the bladder flap was created digitally.     A low transverse uterine incision was made with the scalpel well above the bladder reflection and extended in a cephalad/caudad fashion. The infant’s head was delivered atraumatically followed by the shoulders and body. The infant was vigorous and cord clamping was delayed x 30 seconds with stimulation and suctioning of the nose and mouth. The cord was then clamped and cut and the infant was handed off to waiting staff.     The placenta was then removed with gentle traction on the cord and uterine massage.  The uterus was cleared of all clots and debris with a wet lap sponge.  The uterine incision was repaired with a 0 monocryl in a running locked fashion. A second imbricating layer was applied with the same suture. The Arnie retractor was removed and the gutters were cleared of all clots.  The uterine incision was reinspected and made sufficiently hemostatic with Surgicel.    The fascia was elevated and the rectus muscles and subfascial tissues were made hemostatic with the bovie. The peritoneum was closed with 3-0 chromic in a running fashion. The fascia was closed with 0 PDS in a running fashion.  The subcutaneous tissues were then irrigated and the bovie was used to achieve satisfactory hemostasis. The subcutaneous space was closed with 3-0 chromic. The skin was closed with Insorb staples. Sponge, lap, needle, and instrument counts were correct per the OR staff.  The patient tolerated the procedure well and was taken to the recovery room in stable condition. She will be admitted to the postpartum unit for her post-operative care.      Complications:   None      Disposition:    Mother to Mother Baby/Postpartum in stable condition currently.   Baby to remain with the mother in stable condition currently.     Ludwig Hernandez MD  Obstetrics and Gynecology  [facility]  9/28/2023  07:46 EDT

## 2023-09-29 LAB
BASOPHILS # BLD AUTO: 0.03 10*3/MM3 (ref 0–0.2)
BASOPHILS NFR BLD AUTO: 0.2 % (ref 0–1.5)
DEPRECATED RDW RBC AUTO: 47.9 FL (ref 37–54)
EOSINOPHIL # BLD AUTO: 0.26 10*3/MM3 (ref 0–0.4)
EOSINOPHIL NFR BLD AUTO: 2.1 % (ref 0.3–6.2)
ERYTHROCYTE [DISTWIDTH] IN BLOOD BY AUTOMATED COUNT: 16.3 % (ref 12.3–15.4)
HCT VFR BLD AUTO: 25 % (ref 34–46.6)
HGB BLD-MCNC: 8.1 G/DL (ref 12–15.9)
IMM GRANULOCYTES # BLD AUTO: 0.06 10*3/MM3 (ref 0–0.05)
IMM GRANULOCYTES NFR BLD AUTO: 0.5 % (ref 0–0.5)
LYMPHOCYTES # BLD AUTO: 2.59 10*3/MM3 (ref 0.7–3.1)
LYMPHOCYTES NFR BLD AUTO: 21 % (ref 19.6–45.3)
MCH RBC QN AUTO: 26.6 PG (ref 26.6–33)
MCHC RBC AUTO-ENTMCNC: 32.4 G/DL (ref 31.5–35.7)
MCV RBC AUTO: 82.2 FL (ref 79–97)
MONOCYTES # BLD AUTO: 0.97 10*3/MM3 (ref 0.1–0.9)
MONOCYTES NFR BLD AUTO: 7.9 % (ref 5–12)
NEUTROPHILS NFR BLD AUTO: 68.3 % (ref 42.7–76)
NEUTROPHILS NFR BLD AUTO: 8.44 10*3/MM3 (ref 1.7–7)
NRBC BLD AUTO-RTO: 0 /100 WBC (ref 0–0.2)
PLATELET # BLD AUTO: 199 10*3/MM3 (ref 140–450)
PMV BLD AUTO: 11 FL (ref 6–12)
RBC # BLD AUTO: 3.04 10*6/MM3 (ref 3.77–5.28)
WBC NRBC COR # BLD: 12.35 10*3/MM3 (ref 3.4–10.8)

## 2023-09-29 PROCEDURE — 25010000002 ENOXAPARIN PER 10 MG: Performed by: OBSTETRICS & GYNECOLOGY

## 2023-09-29 PROCEDURE — 85025 COMPLETE CBC W/AUTO DIFF WBC: CPT | Performed by: OBSTETRICS & GYNECOLOGY

## 2023-09-29 RX ORDER — FERROUS SULFATE TAB EC 324 MG (65 MG FE EQUIVALENT) 324 (65 FE) MG
324 TABLET DELAYED RESPONSE ORAL 2 TIMES DAILY WITH MEALS
Status: DISCONTINUED | OUTPATIENT
Start: 2023-09-29 | End: 2023-09-30 | Stop reason: HOSPADM

## 2023-09-29 RX ADMIN — FERROUS SULFATE TAB EC 324 MG (65 MG FE EQUIVALENT) 324 MG: 324 (65 FE) TABLET DELAYED RESPONSE at 08:49

## 2023-09-29 RX ADMIN — ENOXAPARIN SODIUM 40 MG: 100 INJECTION SUBCUTANEOUS at 19:52

## 2023-09-29 RX ADMIN — PRENATAL VITAMINS-IRON FUMARATE 27 MG IRON-FOLIC ACID 0.8 MG TABLET 1 TABLET: at 08:49

## 2023-09-29 RX ADMIN — ACETAMINOPHEN 1000 MG: 500 TABLET, FILM COATED ORAL at 05:59

## 2023-09-29 RX ADMIN — ENOXAPARIN SODIUM 40 MG: 100 INJECTION SUBCUTANEOUS at 07:46

## 2023-09-29 RX ADMIN — ACETAMINOPHEN 1000 MG: 500 TABLET, FILM COATED ORAL at 14:30

## 2023-09-29 RX ADMIN — ACETAMINOPHEN 1000 MG: 500 TABLET, FILM COATED ORAL at 22:44

## 2023-09-29 RX ADMIN — IBUPROFEN 800 MG: 800 TABLET ORAL at 02:02

## 2023-09-29 RX ADMIN — IBUPROFEN 800 MG: 800 TABLET ORAL at 18:02

## 2023-09-29 RX ADMIN — IBUPROFEN 800 MG: 800 TABLET ORAL at 10:02

## 2023-09-29 RX ADMIN — OXYCODONE HYDROCHLORIDE 5 MG: 5 TABLET ORAL at 08:49

## 2023-09-29 RX ADMIN — FERROUS SULFATE TAB EC 324 MG (65 MG FE EQUIVALENT) 324 MG: 324 (65 FE) TABLET DELAYED RESPONSE at 18:02

## 2023-09-29 NOTE — PLAN OF CARE
Goal Outcome Evaluation:  Plan of Care Reviewed With: patient        Progress: improving  Outcome Evaluation: Patient VSS; patient able to make needs known for assistance. Patient has adequate pain relief. Patient ambulating in room. Patinet responds to infant needs appropriatley. Patient bottlefeeding and pumping. POC continues at this time.

## 2023-09-29 NOTE — PROGRESS NOTES
BH Marty Vega  : 2000  MRN: [id number]  CSN: [id number]    Post-operative Day #1  Subjective   Her pain is well controlled.  Vaginal bleeding is appropriate amount.  She is voiding without difficulty. She is passing gas and has not had a bowel movement.  She c/o some dizziness when she first gets up. Upon review of her respiratory system, she has no respiratory symptoms and and denies SOB, cough, wheezing, chest pain. She is both breast and bottle feeding      Objective     Min/max vitals past 24 hours:   Temp  Min: 97.6 °F (36.4 °C)  Max: 98.5 °F (36.9 °C)  BP  Min: 95/61  Max: 124/83  Pulse  Min: 69  Max: 96  Resp  Min: 16  Max: 18        General: well developed; well nourished  no acute distress   Resp: breathing is unlabored   CV: Not performed.   Abdomen: incision is covered by bandage  fundus firm and non-tender   Pelvic: Not performed   Ext: normal appearance with no cyanosis or edema and no calf tenderness     Last 3 values   Results from last 7 days   Lab Units 23  0548 23  1637   WBC 10*3/mm3 12.35* 13.78*   HEMOGLOBIN g/dL 8.1* 9.8*   HEMATOCRIT % 25.0* 29.6*   PLATELETS 10*3/mm3 199 264     Lab Results   Component Value Date    ABO O 2023    RH Positive 2023    RUBELLAABIGG 1.78 2023    HEPBSAG Non-Reactive 2023    HEPBSAG Non-Reactive 2023          Assessment   POD #1 S/P Primary  (LTCS)  Anemia     Plan   Continue routine post-operative care  Ferrous Sulfate BID    Heather Joyner, APRN  2023  08:16 EDT

## 2023-09-29 NOTE — PROGRESS NOTES
Patient: Carol Vega  Procedure(s):   SECTION PRIMARY  Anesthesia type: epidural    Patient location: Labor and Delivery  Last vitals:   Vitals:    23 0558   BP: 95/61   Pulse: 78   Resp: 18   Temp: 98 °F (36.7 °C)   SpO2: 97%     Level of consciousness: awake, alert, and oriented    Post-anesthesia pain: adequate analgesia  Airway patency: patent  Respiratory: unassisted  Cardiovascular: stable and blood pressure at baseline  Hydration: euvolemic    Anesthetic complications: no
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
## 2023-09-29 NOTE — PLAN OF CARE
Goal Outcome Evaluation:              Outcome Evaluation: VSS, adequate pain relief, ambulating well, no n/v,+ bonding, bottlefeeding and pumping,continue with routine care

## 2023-09-30 VITALS
SYSTOLIC BLOOD PRESSURE: 138 MMHG | HEIGHT: 63 IN | OXYGEN SATURATION: 98 % | HEART RATE: 94 BPM | TEMPERATURE: 98.6 F | BODY MASS INDEX: 45.86 KG/M2 | WEIGHT: 258.8 LBS | DIASTOLIC BLOOD PRESSURE: 81 MMHG | RESPIRATION RATE: 16 BRPM

## 2023-09-30 LAB
BH BB BLOOD EXPIRATION DATE: NORMAL
BH BB BLOOD EXPIRATION DATE: NORMAL
BH BB BLOOD TYPE BARCODE: 5100
BH BB BLOOD TYPE BARCODE: 5100
BH BB DISPENSE STATUS: NORMAL
BH BB DISPENSE STATUS: NORMAL
BH BB PRODUCT CODE: NORMAL
BH BB PRODUCT CODE: NORMAL
BH BB UNIT NUMBER: NORMAL
BH BB UNIT NUMBER: NORMAL
CROSSMATCH INTERPRETATION: NORMAL
CROSSMATCH INTERPRETATION: NORMAL
UNIT  ABO: NORMAL
UNIT  ABO: NORMAL
UNIT  RH: NORMAL
UNIT  RH: NORMAL

## 2023-09-30 PROCEDURE — 25010000002 ENOXAPARIN PER 10 MG: Performed by: OBSTETRICS & GYNECOLOGY

## 2023-09-30 PROCEDURE — 0503F POSTPARTUM CARE VISIT: CPT | Performed by: OBSTETRICS & GYNECOLOGY

## 2023-09-30 RX ORDER — OXYCODONE HYDROCHLORIDE 5 MG/1
5 TABLET ORAL EVERY 6 HOURS PRN
Qty: 10 TABLET | Refills: 0 | Status: SHIPPED | OUTPATIENT
Start: 2023-09-30 | End: 2023-10-05

## 2023-09-30 RX ORDER — IBUPROFEN 800 MG/1
800 TABLET ORAL EVERY 8 HOURS
Qty: 60 TABLET | Refills: 1 | Status: SHIPPED | OUTPATIENT
Start: 2023-09-30

## 2023-09-30 RX ORDER — ACETAMINOPHEN 500 MG
1000 TABLET ORAL EVERY 6 HOURS PRN
Qty: 60 TABLET | Refills: 1 | Status: SHIPPED | OUTPATIENT
Start: 2023-09-30

## 2023-09-30 RX ADMIN — ACETAMINOPHEN 1000 MG: 500 TABLET, FILM COATED ORAL at 05:20

## 2023-09-30 RX ADMIN — IBUPROFEN 800 MG: 800 TABLET ORAL at 02:49

## 2023-09-30 RX ADMIN — IBUPROFEN 800 MG: 800 TABLET ORAL at 09:59

## 2023-09-30 RX ADMIN — PRENATAL VITAMINS-IRON FUMARATE 27 MG IRON-FOLIC ACID 0.8 MG TABLET 1 TABLET: at 09:59

## 2023-09-30 RX ADMIN — FERROUS SULFATE TAB EC 324 MG (65 MG FE EQUIVALENT) 324 MG: 324 (65 FE) TABLET DELAYED RESPONSE at 07:50

## 2023-09-30 RX ADMIN — OXYCODONE HYDROCHLORIDE 5 MG: 5 TABLET ORAL at 09:59

## 2023-09-30 RX ADMIN — ENOXAPARIN SODIUM 40 MG: 100 INJECTION SUBCUTANEOUS at 07:50

## 2023-09-30 NOTE — PLAN OF CARE
Goal Outcome Evaluation:              Outcome Evaluation: VSS, adequate pain relief,bottlefeeding, + bonding, ambulating well, anticipate discharge

## 2023-09-30 NOTE — DISCHARGE SUMMARY
.. Marty Vega  : 2000  MRN: [id number]  CSN: [id number]    Discharge Summary      Date of Admission: 2023   Date of Discharge: 2023   Discharge Diagnoses: IUP @39w2d  IOL  Failed induction     Procedures Performed: Primary C/S   Consults: none   Brief History: Patient is a 23 y.o.who presented induction of labor at 39+ weeks.  Prolonged labor prolonged rupture resulted in  section.  Postpartum course benign..   Hospital Course: Benign   Pending Studies: None   Condition at discharge: rapidly improving   Discharge Medications:    Your medication list        START taking these medications        Instructions Last Dose Given Next Dose Due   acetaminophen 500 MG tablet  Commonly known as: TYLENOL      Take 2 tablets by mouth Every 6 (Six) Hours As Needed for Mild Pain.       ibuprofen 800 MG tablet  Commonly known as: ADVIL,MOTRIN      Take 1 tablet by mouth Every 8 (Eight) Hours.       oxyCODONE 5 MG immediate release tablet  Commonly known as: ROXICODONE      Take 1 tablet by mouth Every 6 (Six) Hours As Needed for Moderate Pain for up to 5 days.              CONTINUE taking these medications        Instructions Last Dose Given Next Dose Due   calcium gluconate 500 MG tablet      Take 1 tablet by mouth 3 (Three) Times a Day Before Meals.       famotidine 20 MG tablet  Commonly known as: PEPCID      Take 1 tablet by mouth 2 (Two) Times a Day.       ferrous sulfate 324 (65 Fe) MG tablet delayed-release EC tablet      Take 1 tablet by mouth 2 (Two) Times a Day With Meals.       ondansetron ODT 8 MG disintegrating tablet  Commonly known as: ZOFRAN-ODT      Place 1 tablet on the tongue Every 8 (Eight) Hours As Needed for Nausea or Vomiting.              STOP taking these medications      prenatal (CLASSIC) vitamin  tablet  Generic drug: prenatal vitamin        promethazine 25 MG tablet  Commonly known as: PHENERGAN        vitamin B-6 25 MG tablet  Commonly known as: PYRIDOXINE                   Where to Get Your Medications        These medications were sent to [id number] - [place], [place] - [address] AT [facility] [address] - [phone] PH - [phone] FX  [address], [place] [address]      Phone: [phone]   acetaminophen 500 MG tablet  ibuprofen 800 MG tablet  oxyCODONE 5 MG immediate release tablet        Discharge Disposition: home   Follow-up: Future Appointments   Date Time Provider Department Center   10/24/2023 12:50 PM Noemy Tsang MD [facility]  [facility] ([facility]            This note has been electronically signed.    Arcenio Pearson MD  September 30, 2023

## 2023-09-30 NOTE — PROGRESS NOTES
.. Marty Vega  : 2000  MRN: [id number]  CSN: [id number]    Postpartum Day #1  Subjective   Her pain is well controlled. Vaginal bleeding is normal in amount. She is ambulating without difficulty. She is passing gas. She is voiding without difficulty. Her baby is doing well..     Objective     Min/max vitals past 24 hours:   Temp  Min: 98 °F (36.7 °C)  Max: 98.4 °F (36.9 °C)  BP  Min: 113/62  Max: 152/83  Pulse  Min: 90  Max: 104  Resp  Min: 16  Max: 19        General: well developed; well nourished  no acute distress   Abdomen: soft, non-tender; no masses  no umbilical or inguinal hernias are present  no hepato-splenomegaly  incision is clean, dry, intact, and without drainage   Pelvic: Not performed   Ext: Calves NT     Lab Results   Component Value Date    WBC 12.35 (H) 2023    HGB 8.1 (L) 2023    HCT 25.0 (L) 2023    MCV 82.2 2023     2023    RUBELLAABIGG 1.78 2023    ABO O 2023    RH Positive 2023    HEPBSAG Non-Reactive 2023    HEPBSAG Non-Reactive 2023        Assessment   Postpartum Day #1 S/P Primary  (LTCS)     Plan   Continue routine postpartum care  Continue routine post-operative care    Arcenio Pearson MD  2023  08:33 EDT

## 2023-09-30 NOTE — PLAN OF CARE
Goal Outcome Evaluation:               VSS, pain controlled with medications, ambulating without  assistance, bleeding WNL, DC home with infant.

## 2023-10-01 NOTE — PAYOR COMM NOTE
"To:  [employer]  From: Filomena Hernandez RN  Phone: [phone]  Fax: [phone]  NPI: [id number]  TIN: [id number]  Member ID: [id number]   MRN: [id number]    Carol Vega (23 y.o. Female)       Date of Birth   2000    Social Security Number       Address   [address] [place] [address]    Home Phone   [phone]    MRN   [id number]       Evangel[employer]   None    Marital Status   Single                            Admission Date   23    Admission Type   Elective    Admitting Provider   Eve Ricci MD    Attending Provider       Department, Room/Bed   [facility], W2       Discharge Date   2023    Discharge Disposition   Home or Self Care    Discharge Destination                                 Attending Provider: (none)   Allergies: Iodine, Latex    Isolation: None   Infection: None   Code Status: Prior    Ht: 160 cm (63\")   Wt: 117 kg (258 lb 12.8 oz)    Admission Cmt: None   Principal Problem: None                  Active Insurance as of 2023       Primary Coverage       Payor Plan Insurance Group Employer/Plan Group    [employer] [place] [employer]        Payor Plan Address Payor Plan Phone Number Payor Plan Fax Number Effective Dates    [address] [address] [phone]  2018 - None Entered    [facility] [id number]         Subscriber Name Subscriber Birth Date Member ID       CAROL VEGA 2000 [id number]                     Emergency Contacts        (Rel.) Home Phone Work Phone Mobile Phone    Joi Dickson (Mother) [phone] -- --    James Dickson (Father) [phone] -- --                 Discharge Summary        Arcenio Pearson MD at 23 0902          .. Marty Vega  : 2000  MRN: [id number]  CSN: [id number]    Discharge Summary      Date of Admission: 2023   Date of Discharge: 2023   Discharge Diagnoses: IUP @39w2d  IOL  Failed induction     Procedures Performed: Primary C/S   Consults: none "   Brief History: Patient is a 23 y.o.who presented induction of labor at 39+ weeks.  Prolonged labor prolonged rupture resulted in  section.  Postpartum course benign..   Hospital Course: Benign   Pending Studies: None   Condition at discharge: rapidly improving   Discharge Medications:    Your medication list        START taking these medications        Instructions Last Dose Given Next Dose Due   acetaminophen 500 MG tablet  Commonly known as: TYLENOL      Take 2 tablets by mouth Every 6 (Six) Hours As Needed for Mild Pain.       ibuprofen 800 MG tablet  Commonly known as: ADVIL,MOTRIN      Take 1 tablet by mouth Every 8 (Eight) Hours.       oxyCODONE 5 MG immediate release tablet  Commonly known as: ROXICODONE      Take 1 tablet by mouth Every 6 (Six) Hours As Needed for Moderate Pain for up to 5 days.              CONTINUE taking these medications        Instructions Last Dose Given Next Dose Due   calcium gluconate 500 MG tablet      Take 1 tablet by mouth 3 (Three) Times a Day Before Meals.       famotidine 20 MG tablet  Commonly known as: PEPCID      Take 1 tablet by mouth 2 (Two) Times a Day.       ferrous sulfate 324 (65 Fe) MG tablet delayed-release EC tablet      Take 1 tablet by mouth 2 (Two) Times a Day With Meals.       ondansetron ODT 8 MG disintegrating tablet  Commonly known as: ZOFRAN-ODT      Place 1 tablet on the tongue Every 8 (Eight) Hours As Needed for Nausea or Vomiting.              STOP taking these medications      prenatal (CLASSIC) vitamin  tablet  Generic drug: prenatal vitamin        promethazine 25 MG tablet  Commonly known as: PHENERGAN        vitamin B-6 25 MG tablet  Commonly known as: PYRIDOXINE                  Where to Get Your Medications        These medications were sent to [id number] - [place], [place] - [address] AT [facility] [address] - [phone]  - [phone] FX  [address] [place] [address] [place] [address]      Phone:  [phone]   acetaminophen 500 MG tablet  ibuprofen 800 MG tablet  oxyCODONE 5 MG immediate release tablet        Discharge Disposition: home   Follow-up: Future Appointments   Date Time Provider Department Center   10/24/2023 12:50 PM Noemy Tsang MD [facility] ([facility]            This note has been electronically signed.    Arcenio Pearson MD  September 30, 2023        Electronically signed by Arcenio Pearson MD at 09/30/23 0905

## 2023-10-05 ENCOUNTER — OFFICE VISIT (OUTPATIENT)
Dept: OBSTETRICS AND GYNECOLOGY | Facility: CLINIC | Age: 23
End: 2023-10-05
Payer: COMMERCIAL

## 2023-10-05 VITALS
HEIGHT: 63 IN | SYSTOLIC BLOOD PRESSURE: 118 MMHG | WEIGHT: 244 LBS | BODY MASS INDEX: 43.23 KG/M2 | DIASTOLIC BLOOD PRESSURE: 80 MMHG

## 2023-10-05 DIAGNOSIS — Z98.891 STATUS POST CESAREAN SECTION ROUTINE POSTPARTUM FOLLOW-UP: Primary | ICD-10-CM

## 2023-10-05 NOTE — PROGRESS NOTES
Subjective   Chief Complaint   Patient presents with    Post-op     One week post-op C/S, doing well       Carol Vega is a 23 y.o. year old  presenting to be seen for post op visit.  She has no complaints or concerns  Normal bowel and bladder function  Lochia light  She is breast feeding and supplementing  No issues with postpartum blues  Baby doing well     Past Medical History:   Diagnosis Date    Anxiety     Anxiety     Chlamydia     Depression     Endometriosis     Gonorrhea     Kidney stone     Major depression     Migraine     PMS (premenstrual syndrome)     PTSD (post-traumatic stress disorder)     Seizures     Self-injurious behavior     hx of cutting starting at age 15    Suicide attempt     3/25/19-overdose attempt, 2017 overdose attempt    Urinary tract infection         Current Outpatient Medications:     acetaminophen (TYLENOL) 500 MG tablet, Take 2 tablets by mouth Every 6 (Six) Hours As Needed for Mild Pain., Disp: 60 tablet, Rfl: 1    calcium gluconate 500 MG tablet, Take 1 tablet by mouth 3 (Three) Times a Day Before Meals., Disp: 90 tablet, Rfl: 11    famotidine (PEPCID) 20 MG tablet, Take 1 tablet by mouth 2 (Two) Times a Day., Disp: 60 tablet, Rfl: 5    ferrous sulfate 324 (65 Fe) MG tablet delayed-release EC tablet, Take 1 tablet by mouth 2 (Two) Times a Day With Meals., Disp: 60 tablet, Rfl: 6    ibuprofen (ADVIL,MOTRIN) 800 MG tablet, Take 1 tablet by mouth Every 8 (Eight) Hours., Disp: 60 tablet, Rfl: 1    ondansetron ODT (ZOFRAN-ODT) 8 MG disintegrating tablet, Place 1 tablet on the tongue Every 8 (Eight) Hours As Needed for Nausea or Vomiting., Disp: 30 tablet, Rfl: 0    oxyCODONE (ROXICODONE) 5 MG immediate release tablet, Take 1 tablet by mouth Every 6 (Six) Hours As Needed for Moderate Pain for up to 5 days., Disp: 10 tablet, Rfl: 0   Allergies   Allergen Reactions    Iodine Hives    Latex Hives      Past Surgical History:   Procedure Laterality Date     SECTION N/A  "2023    Procedure:  SECTION PRIMARY;  Surgeon: Ludwig Hernandez MD;  Location: [facility];  Service: Obstetrics/Gynecology;  Laterality: N/A;    ESOPHAGEAL DILATATION      TONSILLECTOMY        Social History     Socioeconomic History    Marital status: Single   Tobacco Use    Smoking status: Every Day     Types: Electronic Cigarette    Smokeless tobacco: Never    Tobacco comments:     vapes   Vaping Use    Vaping Use: Every day   Substance and Sexual Activity    Alcohol use: Not Currently     Comment: \"I barely ever drink alcohol.\"    Drug use: Not Currently     Types: Marijuana     Comment: once monthly    Sexual activity: Yes     Partners: Male     Birth control/protection: None      Family History   Problem Relation Age of Onset    Rheum arthritis Mother     Depression Mother     Drug abuse Mother     Heart murmur Father     Drug abuse Father     Alcohol abuse Father        Review of Systems   Constitutional:  Negative for chills, diaphoresis and fever.   Gastrointestinal:  Negative for constipation, diarrhea, nausea and vomiting.   Genitourinary:  Negative for difficulty urinating and dysuria.         Objective   /80   Ht 160 cm (63\")   Wt 111 kg (244 lb)   LMP 2022   Breastfeeding Yes   BMI 43.22 kg/m²     Physical Exam  Constitutional:       Appearance: Normal appearance. She is well-developed and well-groomed.   Eyes:      General: Lids are normal.      Extraocular Movements: Extraocular movements intact.      Conjunctiva/sclera: Conjunctivae normal.   Abdominal:      Palpations: Abdomen is soft.      Tenderness: There is no abdominal tenderness.      Comments: Incision healing well   Neurological:      General: No focal deficit present.      Mental Status: She is alert and oriented to person, place, and time.   Psychiatric:         Attention and Perception: Attention normal.         Mood and Affect: Mood normal.         Speech: Speech normal.         Behavior: Behavior is " cooperative.          Result Review :                   Assessment and Plan  Diagnoses and all orders for this visit:    1. Status post  section routine postpartum follow-up (Primary)      Patient Instructions   Follow up 5 weeks or prn  Unsure regarding contraception at this time           This note was electronically signed.    Naz Mcintyre PA-C   2023

## 2023-10-11 ENCOUNTER — TELEPHONE (OUTPATIENT)
Dept: SURGERY | Facility: CLINIC | Age: 23
End: 2023-10-11
Payer: COMMERCIAL

## 2023-10-11 ENCOUNTER — TELEPHONE (OUTPATIENT)
Dept: PREADMISSION TESTING | Facility: HOSPITAL | Age: 23
End: 2023-10-11

## 2023-10-12 ENCOUNTER — PRE-ADMISSION TESTING (OUTPATIENT)
Dept: PREADMISSION TESTING | Facility: HOSPITAL | Age: 23
End: 2023-10-12
Payer: COMMERCIAL

## 2023-10-12 VITALS — HEIGHT: 63 IN | WEIGHT: 238.8 LBS | BODY MASS INDEX: 42.31 KG/M2

## 2023-10-12 LAB
B-HCG UR QL: NEGATIVE
DEPRECATED RDW RBC AUTO: 45.1 FL (ref 37–54)
ERYTHROCYTE [DISTWIDTH] IN BLOOD BY AUTOMATED COUNT: 15.2 % (ref 12.3–15.4)
HCT VFR BLD AUTO: 33 % (ref 34–46.6)
HGB BLD-MCNC: 10.7 G/DL (ref 12–15.9)
MCH RBC QN AUTO: 26.4 PG (ref 26.6–33)
MCHC RBC AUTO-ENTMCNC: 32.4 G/DL (ref 31.5–35.7)
MCV RBC AUTO: 81.3 FL (ref 79–97)
PLATELET # BLD AUTO: 337 10*3/MM3 (ref 140–450)
PMV BLD AUTO: 10.2 FL (ref 6–12)
RBC # BLD AUTO: 4.06 10*6/MM3 (ref 3.77–5.28)
WBC NRBC COR # BLD: 7.85 10*3/MM3 (ref 3.4–10.8)

## 2023-10-12 PROCEDURE — 85027 COMPLETE CBC AUTOMATED: CPT

## 2023-10-12 PROCEDURE — 36415 COLL VENOUS BLD VENIPUNCTURE: CPT

## 2023-10-12 PROCEDURE — 81025 URINE PREGNANCY TEST: CPT

## 2023-10-12 RX ORDER — OXYCODONE HYDROCHLORIDE 5 MG/1
5 CAPSULE ORAL EVERY 6 HOURS PRN
COMMUNITY
End: 2023-10-16 | Stop reason: HOSPADM

## 2023-10-12 NOTE — DISCHARGE INSTRUCTIONS
PAT PASS GIVEN/REVIEWED WITH PT.  VERBALIZED UNDERSTANDING OF THE FOLLOWING:  DO NOT EAT, DRINK, SMOKE, USE SMOKELESS TOBACCO OR CHEW GUM AFTER MIDNIGHT THE NIGHT BEFORE SURGERY.  THIS ALSO INCLUDES HARD CANDIES AND MINTS.    DO NOT SHAVE THE AREA TO BE OPERATED ON AT LEAST 48 HOURS PRIOR TO THE PROCEDURE.  DO NOT WEAR MAKE UP OR NAIL POLISH.  DO NOT LEAVE IN ANY PIERCING OR WEAR JEWELRY THE DAY OF SURGERY.      DO NOT USE ADHESIVES IF YOU WEAR DENTURES.    DO NOT WEAR EYE CONTACTS; BRING IN YOUR GLASSES.    ONLY TAKE MEDICATION THE MORNING OF YOUR PROCEDURE IF INSTRUCTED BY YOUR SURGEON WITH ENOUGH WATER TO SWALLOW THE MEDICATION.  IF YOUR SURGEON DID NOT SPECIFY WHICH MEDICATIONS TO TAKE, YOU WILL NEED TO CALL THEIR OFFICE FOR FURTHER INSTRUCTIONS AND DO AS THEY INSTRUCT.    LEAVE ANYTHING YOU CONSIDER VALUABLE AT HOME.    YOU WILL NEED TO ARRANGE FOR SOMEONE TO DRIVE YOU HOME AFTER SURGERY.  IT IS RECOMMENDED THAT YOU DO NOT DRIVE, WORK, DRINK ALCOHOL OR MAKE MAJOR DECISIONS FOR AT LEAST 24 HOURS AFTER YOUR PROCEDURE IS COMPLETE.      THE DAY OF YOUR PROCEDURE, BRING IN THE FOLLOWING IF APPLICABLE:   PICTURE ID AND INSURANCE/MEDICARE OR MEDICAID CARDS/ANY CO-PAY THAT MAY BE DUE   COPY OF ADVANCED DIRECTIVE/LIVING WILL/POWER OR    CPAP/BIPAP/INHALERS   SKIN PREP SHEET   YOUR PREADMISSION TESTING PASS (IF NOT A PHONE HISTORY)    Medication instructions given to pt by RN per anesthesia protocol.  Pt referred back to surgeon for further instructions if he/she is on any blood thinners.          Chlorhexadine wipes along with instruction/verification sheet given to pt.  Instructed pt to date, time, and initial the verification sheet once skin prep has been  completed, and [facility].  Pt. Verbalizes understanding.     Introduction to anesthesia video viewed by pt in PAT.

## 2023-10-16 ENCOUNTER — ANESTHESIA (OUTPATIENT)
Dept: PERIOP | Facility: HOSPITAL | Age: 23
End: 2023-10-16
Payer: COMMERCIAL

## 2023-10-16 ENCOUNTER — HOSPITAL ENCOUNTER (OUTPATIENT)
Facility: HOSPITAL | Age: 23
Setting detail: HOSPITAL OUTPATIENT SURGERY
Discharge: HOME | End: 2023-10-16
Attending: SURGERY | Admitting: SURGERY
Payer: COMMERCIAL

## 2023-10-16 ENCOUNTER — ANESTHESIA EVENT (OUTPATIENT)
Dept: PERIOP | Facility: HOSPITAL | Age: 23
End: 2023-10-16
Payer: COMMERCIAL

## 2023-10-16 VITALS
TEMPERATURE: 97.6 F | OXYGEN SATURATION: 98 % | HEART RATE: 74 BPM | DIASTOLIC BLOOD PRESSURE: 82 MMHG | RESPIRATION RATE: 16 BRPM | SYSTOLIC BLOOD PRESSURE: 138 MMHG

## 2023-10-16 DIAGNOSIS — Z90.49 S/P LAPAROSCOPIC CHOLECYSTECTOMY: Primary | ICD-10-CM

## 2023-10-16 DIAGNOSIS — K80.20 CALCULUS OF GALLBLADDER WITHOUT CHOLECYSTITIS WITHOUT OBSTRUCTION: ICD-10-CM

## 2023-10-16 PROCEDURE — 25010000002 HYDROMORPHONE 1 MG/ML SOLUTION: Performed by: NURSE ANESTHETIST, CERTIFIED REGISTERED

## 2023-10-16 PROCEDURE — 25010000002 SUCCINYLCHOLINE PER 20 MG: Performed by: NURSE ANESTHETIST, CERTIFIED REGISTERED

## 2023-10-16 PROCEDURE — S0260 H&P FOR SURGERY: HCPCS | Performed by: SURGERY

## 2023-10-16 PROCEDURE — 25010000002 FENTANYL CITRATE (PF) 100 MCG/2ML SOLUTION: Performed by: NURSE ANESTHETIST, CERTIFIED REGISTERED

## 2023-10-16 PROCEDURE — 25010000002 MIDAZOLAM PER 1MG: Performed by: NURSE ANESTHETIST, CERTIFIED REGISTERED

## 2023-10-16 PROCEDURE — 25010000002 BUPIVACAINE (PF) 0.25 % SOLUTION: Performed by: SURGERY

## 2023-10-16 PROCEDURE — 25010000002 HEPARIN (PORCINE) PER 1000 UNITS: Performed by: SURGERY

## 2023-10-16 PROCEDURE — 25010000002 ONDANSETRON PER 1 MG: Performed by: NURSE ANESTHETIST, CERTIFIED REGISTERED

## 2023-10-16 PROCEDURE — 25010000002 PROPOFOL 200 MG/20ML EMULSION: Performed by: NURSE ANESTHETIST, CERTIFIED REGISTERED

## 2023-10-16 PROCEDURE — 25010000002 AMPICILLIN-SULBACTAM PER 1.5 G: Performed by: SURGERY

## 2023-10-16 PROCEDURE — 25010000002 SUGAMMADEX 200 MG/2ML SOLUTION: Performed by: NURSE ANESTHETIST, CERTIFIED REGISTERED

## 2023-10-16 PROCEDURE — 25010000002 KETOROLAC TROMETHAMINE PER 15 MG: Performed by: NURSE ANESTHETIST, CERTIFIED REGISTERED

## 2023-10-16 PROCEDURE — 25810000003 LACTATED RINGERS PER 1000 ML: Performed by: SURGERY

## 2023-10-16 PROCEDURE — 25010000002 LIDOCAINE 1 % SOLUTION: Performed by: SURGERY

## 2023-10-16 PROCEDURE — 25010000002 DEXAMETHASONE PER 1 MG: Performed by: NURSE ANESTHETIST, CERTIFIED REGISTERED

## 2023-10-16 PROCEDURE — 47562 LAPAROSCOPIC CHOLECYSTECTOMY: CPT | Performed by: SURGERY

## 2023-10-16 DEVICE — LIGAMAX 5 MM ENDOSCOPIC MULTIPLE CLIP APPLIER
Type: IMPLANTABLE DEVICE | Site: ABDOMEN | Status: FUNCTIONAL
Brand: LIGAMAX

## 2023-10-16 RX ORDER — LORAZEPAM 2 MG/ML
1 INJECTION INTRAMUSCULAR
Status: DISCONTINUED | OUTPATIENT
Start: 2023-10-16 | End: 2023-10-16 | Stop reason: HOSPADM

## 2023-10-16 RX ORDER — SODIUM CHLORIDE 0.9 % (FLUSH) 0.9 %
10 SYRINGE (ML) INJECTION AS NEEDED
Status: DISCONTINUED | OUTPATIENT
Start: 2023-10-16 | End: 2023-10-16 | Stop reason: HOSPADM

## 2023-10-16 RX ORDER — BUPIVACAINE HYDROCHLORIDE 2.5 MG/ML
INJECTION, SOLUTION EPIDURAL; INFILTRATION; INTRACAUDAL AS NEEDED
Status: DISCONTINUED | OUTPATIENT
Start: 2023-10-16 | End: 2023-10-16 | Stop reason: HOSPADM

## 2023-10-16 RX ORDER — PROPOFOL 10 MG/ML
INJECTION, EMULSION INTRAVENOUS AS NEEDED
Status: DISCONTINUED | OUTPATIENT
Start: 2023-10-16 | End: 2023-10-16 | Stop reason: SURG

## 2023-10-16 RX ORDER — HYDROCODONE BITARTRATE AND ACETAMINOPHEN 7.5; 325 MG/1; MG/1
1 TABLET ORAL EVERY 4 HOURS PRN
Qty: 12 TABLET | Refills: 0 | Status: SHIPPED | OUTPATIENT
Start: 2023-10-16

## 2023-10-16 RX ORDER — ONDANSETRON 2 MG/ML
4 INJECTION INTRAMUSCULAR; INTRAVENOUS ONCE AS NEEDED
Status: DISCONTINUED | OUTPATIENT
Start: 2023-10-16 | End: 2023-10-16 | Stop reason: HOSPADM

## 2023-10-16 RX ORDER — HYDROCODONE BITARTRATE AND ACETAMINOPHEN 7.5; 325 MG/1; MG/1
1 TABLET ORAL ONCE AS NEEDED
Status: DISCONTINUED | OUTPATIENT
Start: 2023-10-16 | End: 2023-10-16 | Stop reason: HOSPADM

## 2023-10-16 RX ORDER — MEPERIDINE HYDROCHLORIDE 25 MG/ML
12.5 INJECTION INTRAMUSCULAR; INTRAVENOUS; SUBCUTANEOUS
Status: DISCONTINUED | OUTPATIENT
Start: 2023-10-16 | End: 2023-10-16 | Stop reason: HOSPADM

## 2023-10-16 RX ORDER — DEXAMETHASONE SODIUM PHOSPHATE 4 MG/ML
INJECTION, SOLUTION INTRA-ARTICULAR; INTRALESIONAL; INTRAMUSCULAR; INTRAVENOUS; SOFT TISSUE AS NEEDED
Status: DISCONTINUED | OUTPATIENT
Start: 2023-10-16 | End: 2023-10-16 | Stop reason: SURG

## 2023-10-16 RX ORDER — KETAMINE HYDROCHLORIDE 50 MG/ML
INJECTION, SOLUTION, CONCENTRATE INTRAMUSCULAR; INTRAVENOUS AS NEEDED
Status: DISCONTINUED | OUTPATIENT
Start: 2023-10-16 | End: 2023-10-16 | Stop reason: SURG

## 2023-10-16 RX ORDER — FENTANYL CITRATE 50 UG/ML
INJECTION, SOLUTION INTRAMUSCULAR; INTRAVENOUS AS NEEDED
Status: DISCONTINUED | OUTPATIENT
Start: 2023-10-16 | End: 2023-10-16 | Stop reason: SURG

## 2023-10-16 RX ORDER — ROCURONIUM BROMIDE 10 MG/ML
INJECTION, SOLUTION INTRAVENOUS AS NEEDED
Status: DISCONTINUED | OUTPATIENT
Start: 2023-10-16 | End: 2023-10-16 | Stop reason: SURG

## 2023-10-16 RX ORDER — LIDOCAINE HYDROCHLORIDE 10 MG/ML
INJECTION, SOLUTION INFILTRATION; PERINEURAL AS NEEDED
Status: DISCONTINUED | OUTPATIENT
Start: 2023-10-16 | End: 2023-10-16 | Stop reason: HOSPADM

## 2023-10-16 RX ORDER — SODIUM CHLORIDE, SODIUM LACTATE, POTASSIUM CHLORIDE, CALCIUM CHLORIDE 600; 310; 30; 20 MG/100ML; MG/100ML; MG/100ML; MG/100ML
50 INJECTION, SOLUTION INTRAVENOUS CONTINUOUS
Status: DISCONTINUED | OUTPATIENT
Start: 2023-10-16 | End: 2023-10-16 | Stop reason: HOSPADM

## 2023-10-16 RX ORDER — ACETAMINOPHEN 500 MG
1000 TABLET ORAL ONCE
Status: COMPLETED | OUTPATIENT
Start: 2023-10-16 | End: 2023-10-16

## 2023-10-16 RX ORDER — SUCCINYLCHOLINE CHLORIDE 20 MG/ML
INJECTION INTRAMUSCULAR; INTRAVENOUS AS NEEDED
Status: DISCONTINUED | OUTPATIENT
Start: 2023-10-16 | End: 2023-10-16 | Stop reason: SURG

## 2023-10-16 RX ORDER — SODIUM CHLORIDE 9 MG/ML
40 INJECTION, SOLUTION INTRAVENOUS AS NEEDED
Status: DISCONTINUED | OUTPATIENT
Start: 2023-10-16 | End: 2023-10-16 | Stop reason: HOSPADM

## 2023-10-16 RX ORDER — ONDANSETRON 2 MG/ML
INJECTION INTRAMUSCULAR; INTRAVENOUS AS NEEDED
Status: DISCONTINUED | OUTPATIENT
Start: 2023-10-16 | End: 2023-10-16 | Stop reason: SURG

## 2023-10-16 RX ORDER — SCOLOPAMINE TRANSDERMAL SYSTEM 1 MG/1
1 PATCH, EXTENDED RELEASE TRANSDERMAL
Status: DISCONTINUED | OUTPATIENT
Start: 2023-10-16 | End: 2023-10-16 | Stop reason: HOSPADM

## 2023-10-16 RX ORDER — KETOROLAC TROMETHAMINE 30 MG/ML
INJECTION, SOLUTION INTRAMUSCULAR; INTRAVENOUS AS NEEDED
Status: DISCONTINUED | OUTPATIENT
Start: 2023-10-16 | End: 2023-10-16 | Stop reason: SURG

## 2023-10-16 RX ORDER — MIDAZOLAM HYDROCHLORIDE 2 MG/2ML
INJECTION, SOLUTION INTRAMUSCULAR; INTRAVENOUS AS NEEDED
Status: DISCONTINUED | OUTPATIENT
Start: 2023-10-16 | End: 2023-10-16 | Stop reason: SURG

## 2023-10-16 RX ORDER — LIDOCAINE HYDROCHLORIDE 20 MG/ML
INJECTION, SOLUTION INTRAVENOUS AS NEEDED
Status: DISCONTINUED | OUTPATIENT
Start: 2023-10-16 | End: 2023-10-16 | Stop reason: SURG

## 2023-10-16 RX ORDER — SODIUM CHLORIDE 0.9 % (FLUSH) 0.9 %
10 SYRINGE (ML) INJECTION EVERY 12 HOURS SCHEDULED
Status: DISCONTINUED | OUTPATIENT
Start: 2023-10-16 | End: 2023-10-16 | Stop reason: HOSPADM

## 2023-10-16 RX ORDER — HEPARIN SODIUM 5000 [USP'U]/ML
5000 INJECTION, SOLUTION INTRAVENOUS; SUBCUTANEOUS ONCE
Status: COMPLETED | OUTPATIENT
Start: 2023-10-16 | End: 2023-10-16

## 2023-10-16 RX ADMIN — KETAMINE HYDROCHLORIDE 25 MG: 50 INJECTION, SOLUTION INTRAMUSCULAR; INTRAVENOUS at 09:33

## 2023-10-16 RX ADMIN — SUGAMMADEX 200 MG: 100 INJECTION, SOLUTION INTRAVENOUS at 10:00

## 2023-10-16 RX ADMIN — KETAMINE HYDROCHLORIDE 25 MG: 50 INJECTION, SOLUTION INTRAMUSCULAR; INTRAVENOUS at 09:36

## 2023-10-16 RX ADMIN — LIDOCAINE HYDROCHLORIDE 60 MG: 20 INJECTION, SOLUTION INTRAVENOUS at 09:21

## 2023-10-16 RX ADMIN — ROCURONIUM BROMIDE 40 MG: 10 INJECTION, SOLUTION INTRAVENOUS at 09:33

## 2023-10-16 RX ADMIN — HYDROMORPHONE HYDROCHLORIDE 0.5 MG: 1 INJECTION, SOLUTION INTRAMUSCULAR; INTRAVENOUS; SUBCUTANEOUS at 10:31

## 2023-10-16 RX ADMIN — SCOLOPAMINE TRANSDERMAL SYSTEM 1 PATCH: 1 PATCH, EXTENDED RELEASE TRANSDERMAL at 08:38

## 2023-10-16 RX ADMIN — SODIUM CHLORIDE, POTASSIUM CHLORIDE, SODIUM LACTATE AND CALCIUM CHLORIDE 50 ML/HR: 600; 310; 30; 20 INJECTION, SOLUTION INTRAVENOUS at 08:33

## 2023-10-16 RX ADMIN — MIDAZOLAM HYDROCHLORIDE 2 MG: 1 INJECTION, SOLUTION INTRAMUSCULAR; INTRAVENOUS at 09:21

## 2023-10-16 RX ADMIN — SUCCINYLCHOLINE CHLORIDE 100 MG: 20 INJECTION, SOLUTION INTRAMUSCULAR; INTRAVENOUS at 09:21

## 2023-10-16 RX ADMIN — FENTANYL CITRATE 100 MCG: 50 INJECTION INTRAMUSCULAR; INTRAVENOUS at 09:21

## 2023-10-16 RX ADMIN — ONDANSETRON 4 MG: 2 INJECTION INTRAMUSCULAR; INTRAVENOUS at 09:21

## 2023-10-16 RX ADMIN — DEXAMETHASONE SODIUM PHOSPHATE 8 MG: 4 INJECTION, SOLUTION INTRA-ARTICULAR; INTRALESIONAL; INTRAMUSCULAR; INTRAVENOUS; SOFT TISSUE at 09:33

## 2023-10-16 RX ADMIN — Medication 3 G: at 09:18

## 2023-10-16 RX ADMIN — PROPOFOL 200 MG: 10 INJECTION, EMULSION INTRAVENOUS at 09:21

## 2023-10-16 RX ADMIN — ACETAMINOPHEN 1000 MG: 500 TABLET, FILM COATED ORAL at 08:38

## 2023-10-16 RX ADMIN — HEPARIN SODIUM 5000 UNITS: 5000 INJECTION INTRAVENOUS; SUBCUTANEOUS at 08:38

## 2023-10-16 RX ADMIN — KETOROLAC TROMETHAMINE 30 MG: 30 INJECTION, SOLUTION INTRAMUSCULAR at 09:58

## 2023-10-16 NOTE — DISCHARGE INSTRUCTIONS
PATIENT INSTRUCTIONS  GALL BLADDER SURGERY  (CHOLECYSTECTOMY)    FOLLOW-UP: Please make an appointment with  in 1 week. Call immediately if you have any fevers greater than 102.5 degrees Fahrenheit, drainage from your wound that is not clear or looks infected, persistent bleeding, increasing abdominal pain, problems urinating, or persistent nausea/vomiting. You should be aware that you may have right shoulder pain after surgery and that this will progressively go away. This is called 'referred pain' and is from the area of the gallbladder. It can also be caused by gas that may be trapped under the diaphragm from the surgery, especially if it was performed laparoscopically through mini-incisions. This gas will progressively get reabsorbed by your body. A heating pad or hot compress can help with this discomfort.  Walking will also help the gas reabsorb more quickly.     WOUND CARE INSTRUCTIONS: The initial bandage may be removed after 48 hours. Steri strips (white tabs over the wounds) should be left in place and these will fall off on their own in 10-14 days. You may shower after 48 hours, but do not soak in the tub or be submerged underwater for 2 weeks.  If clothing rubs against the wound or causes irritation and the wound is not draining, you may cover it with a dry dressing during the daytime. Try to keep the wound dry and avoid ointments or peroxide on the wound unless directed to do so. If the wound becomes bright red and painful or starts to drain infected material that is not clear, please contact your physician immediately.   If the wound is mildly pink and has a thick firm ridge underneath it, this is normal and is referred to as a healing ridge. This will resolve over the next 4-6 weeks.    DIET: You may eat any foods that you can tolerate. It is a good idea to eat a low fat, high fiber diet, and take in plenty of fluids to prevent diarrhea/constipation. If you do become constipated, you may want  to take a stool softener (Colace), a mild laxative or take Dulcolax tablets on a daily basis until your bowel habits are regular.     ACTIVITY: You are encouraged to cough and take deep breaths, or if you were given one, use your incentive spirometer every 15-30 minutes when awake. This will help prevent respiratory complications and low grade fevers post-operatively. You may want to hug a pillow when coughing and sneezing to add additional support to the surgical area which will decrease pain during these times. You are encouraged to walk and engage in light activity for the next two weeks. You should not lift more than 20 pounds during this time frame as it could put you at increased risk for a post-operative hernia. Twenty pounds is roughly equivalent to a plastic bag of groceries.     MEDICATIONS: Try to take narcotic medications and anti-inflammatory medications, such as Mortin, Ibuprofen, Naprosyn, etc., with food. This will minimize stomach upset from the medication. Should you develop nausea and vomiting from the pain medication, or develop a rash, please discontinue the medication and contact your physician. You should not drive, make important decisions, or operate machinery when taking narcotic pain medication.    QUESTIONS: Please feel free to call Dr. Tsang's office at [phone] if you have any questions, and they will be glad to assist you.  This number is answered 24 hours a day. After usual business hours, you will be connected to the answering service and they will contact Dr. Tsang.     No pushing, pulling, tugging,  heavy lifting, or strenuous activity.  No major decision making, driving, or drinking alcoholic beverages for 24 hours. ( due to the medications you have  received)  Always use good hand hygiene/washing techniques.  NO driving while taking pain medications.    * if you have an incision:  Check your incision area every day for signs of infection.   Check for:  * more redness,  swelling, or pain  *more fluid or blood  *warmth  *pus or bad smellTo assist you in voiding:  Drink plenty of fluids  Listen to running water while attempting to void.    If you are unable to urinate and you have an uncomfortable urge to void or it has been   6 hours since you were discharged, return to the Emergency Room

## 2023-10-16 NOTE — ANESTHESIA PREPROCEDURE EVALUATION
Anesthesia Evaluation     Patient summary reviewed and Nursing notes reviewed   no history of anesthetic complications:   NPO Solid Status: > 8 hours  NPO Liquid Status: > 8 hours           Airway   Mallampati: II  TM distance: >3 FB  Neck ROM: full  Possible difficult intubation  Dental - normal exam     Pulmonary - normal exam   (+) a smoker Current,  Cardiovascular - normal exam  Exercise tolerance: good (4-7 METS)    ECG reviewed  Rhythm: regular  Rate: normal        Neuro/Psych  (+) seizures, headaches, psychiatric history Anxiety, Depression and ADHD  GI/Hepatic/Renal/Endo    (+) morbid obesity, GERD, renal disease stones    Musculoskeletal     Abdominal   (+) obese    Abdomen: soft.  Bowel sounds: normal.   Substance History   (+) drug use     OB/GYN negative ob/gyn ROS   (-)  Pregnant        Other                    Anesthesia Plan    ASA 3     general     (Risks and benefits discussed including risk of aspiration, recall and dental damage. All patient questions answered. Will continue with POC. )  intravenous induction     Anesthetic plan, risks, benefits, and alternatives have been provided, discussed and informed consent has been obtained with: patient.  Pre-procedure education provided    CODE STATUS:

## 2023-10-16 NOTE — H&P
General Surgery H&P    Name:Carol Vega  Age: 23 y.o.  Gender: female  : 2000  MRN: [id number]  Visit Number: [id number]  Admit Date: 10/16/2023  Date of Service: 10/16/23    Patient Care Team:  Hafsa Woo APRN as PCP - General (Family Medicine)  Perez León MD as Consulting Physician (Psychiatry)    Chief complaint : gallstones      History of Present Illness:     Carol Vega is a 23 y.o. female patient who presents for scheduled laparoscopic cholecystectomy.  She was previously seen in my office in late 2023 for right upper quadrant abdominal pain and recently discovered gallstones.  At that time, she was approximately 30 weeks gestation.  During her initial evaluation, she described having symptoms consistent with recurrent biliary colic, and had 2 separate ultrasound evaluations in February, and 2023 both of which had demonstrated gallstones without evidence of cholecystitis or biliary obstruction.  The decision was made to delay surgical intervention until after she had delivered her infant.  She presents today for the scheduled surgical procedure, and reports no changes to her medical history.        Patient Active Problem List   Diagnosis    Severe episode of recurrent major depressive disorder, with psychotic features    Oppositional defiant disorder    MDD (major depressive disorder), severe    Cannabis abuse, continuous    PTSD (post-traumatic stress disorder)    Exposure to lead    Calculus of gallbladder without cholecystitis without obstruction    Other failed induction of labor    S/P  section    Prolonged rupture of membranes    BMI 45.0-49.9, adult       Past Medical History:   Diagnosis Date    Anxiety     Chlamydia     Endometriosis     Gonorrhea     Kidney stone     Major depression     Migraine     PMS (premenstrual syndrome)     PTSD (post-traumatic stress disorder)     Seizures     trauma induced years ago    Self-injurious behavior     hx of cutting  starting at age 15    Suicide attempt     3/25/19-overdose attempt, 2017 overdose attempt    Urinary tract infection        Past Surgical History:   Procedure Laterality Date     SECTION N/A 2023    Procedure:  SECTION PRIMARY;  Surgeon: Ludwig Hernandez MD;  Location: [facility];  Service: Obstetrics/Gynecology;  Laterality: N/A;    ESOPHAGEAL DILATATION      TONSILLECTOMY         Family History   Problem Relation Age of Onset    Rheum arthritis Mother     Depression Mother     Drug abuse Mother     Heart murmur Father     Drug abuse Father     Alcohol abuse Father        Social History     Socioeconomic History    Marital status: Single   Tobacco Use    Smoking status: Every Day     Types: Electronic Cigarette    Smokeless tobacco: Never    Tobacco comments:     vapes   Vaping Use    Vaping Use: Every day    Substances: Nicotine   Substance and Sexual Activity    Alcohol use: Never    Drug use: Not Currently     Types: Marijuana     Comment: not smoked in 3 years    Sexual activity: Yes     Partners: Male     Birth control/protection: None         Current Facility-Administered Medications:     ampicillin-sulbactam 3 g/100 mL 0.9% NS IVPB, 3 g, Intravenous, Once, Noemy Tsang MD    lactated ringers infusion, 50 mL/hr, Intravenous, Continuous, Noemy Tsang MD, Last Rate: 50 mL/hr at 10/16/23 0833, 50 mL/hr at 10/16/23 0833    scopolamine patch 1 mg/72 hr, 1 patch, Transdermal, Q72H, Kailash Mittal CRNA, 1 patch at 10/16/23 0838    sodium chloride 0.9 % flush 10 mL, 10 mL, Intravenous, Q12H, Noemy Tsang MD    sodium chloride 0.9 % flush 10 mL, 10 mL, Intravenous, PRN, Noemy Tsang MD    sodium chloride 0.9 % infusion 40 mL, 40 mL, Intravenous, PRN, Noemy Tsang MD    Medications Prior to Admission   Medication Sig Dispense Refill Last Dose    acetaminophen (TYLENOL) 500 MG tablet Take 2 tablets by mouth Every 6 (Six) Hours As Needed for Mild Pain. 60 tablet  1 Past Week    calcium gluconate 500 MG tablet Take 1 tablet by mouth 3 (Three) Times a Day Before Meals. 90 tablet 11 Past Week    famotidine (PEPCID) 20 MG tablet Take 1 tablet by mouth 2 (Two) Times a Day. (Patient taking differently: Take 1 tablet by mouth 2 (Two) Times a Day As Needed.) 60 tablet 5 Past Week    ferrous sulfate 324 (65 Fe) MG tablet delayed-release EC tablet Take 1 tablet by mouth 2 (Two) Times a Day With Meals. 60 tablet 6 Past Week    ibuprofen (ADVIL,MOTRIN) 800 MG tablet Take 1 tablet by mouth Every 8 (Eight) Hours. 60 tablet 1 Past Week    ondansetron ODT (ZOFRAN-ODT) 8 MG disintegrating tablet Place 1 tablet on the tongue Every 8 (Eight) Hours As Needed for Nausea or Vomiting. 30 tablet 0 Past Week    oxyCODONE (OXY-IR) 5 MG capsule Take 1 capsule by mouth Every 6 (Six) Hours As Needed for Moderate Pain.   Past Week       Allergies   Allergen Reactions    Iodine Hives    Latex Hives       Review of Systems   Constitutional: Negative.    HENT: Negative.     Eyes: Negative.    Respiratory: Negative.     Cardiovascular: Negative.    Gastrointestinal:  Positive for abdominal pain, nausea and vomiting.   Endocrine: Negative.    Genitourinary: Negative.    Musculoskeletal: Negative.    Skin: Negative.    Allergic/Immunologic: Negative.    Neurological: Negative.    Hematological: Negative.    Psychiatric/Behavioral: Negative.         OBJECTIVE:     Vital Signs  Temp:  [96.8 °F (36 °C)] 96.8 °F (36 °C)  Heart Rate:  [83] 83  Resp:  [16] 16  BP: (105)/(54) 105/54    No intake/output data recorded.  No intake/output data recorded.      Physical Exam:      General Appearance:    Alert, cooperative, in no acute distress   Head:    Normocephalic, without obvious abnormality, atraumatic   Eyes:            Lids and lashes normal, conjunctivae and sclerae normal, no icterus   Ears:    Ears appear intact with no abnormalities noted   Lungs:     Respirations regular, even and unlabored    Heart:    Regular  rhythm and normal rate   Abdomen:     Soft, non-tender, non-distended, no guarding, no rebound   tenderness   Genitalia:    Deferred   Extremities:   Moves all extremities well, no edema, no cyanosis, no  redness   Pulses:   Pulses palpable and equal bilaterally   Skin:   No bleeding, bruising or rash   Neurologic:   AAOx3, no gross deficits         Results Review:   I have reviewed the entirety of the patient's clinical lab results.  I have also personally reviewed the patient's imaging      Lab Results (last 72 hours)       ** No results found for the last 72 hours. **                            ASSESSMENT/PLAN:      Calculus of gallbladder without cholecystitis without obstruction     I again recommended to the patient that we proceed with laparoscopic cholecystectomy for definitive management of symptoms related to underlying gallbladder disease, which have failed to respond to medical management.  We discussed the laparoscopic cholecystectomy procedure in detail along with the risks, benefits, and alternatives.  We specifically discussed the risks of bleeding, infection, conversion to an open procedure, postoperative bile leak, common bile duct injury, the need for ERCP (in the setting of bile leak, choledocholithiasis, etc.), and the risks related to anesthesia.  The patient understands these, and is willing to proceed.      Noemy Tsang MD  10/16/23  09:05 EDT

## 2023-10-16 NOTE — ANESTHESIA PROCEDURE NOTES
Airway  Urgency: elective    Date/Time: 10/16/2023 9:27 AM    General Information and Staff    Patient location during procedure: OR  CRNA/CAA: Kailash Mittal CRNA    Indications and Patient Condition  Indications for airway management: airway protection    Preoxygenated: yes  Mask difficulty assessment: 1 - vent by mask    Final Airway Details  Final airway type: endotracheal airway      Successful airway: ETT  Cuffed: yes   Successful intubation technique: direct laryngoscopy  Endotracheal tube insertion site: oral  Blade: Jalil  Blade size: 3  ETT size (mm): 7.0  Cormack-Lehane Classification: grade I - full view of glottis  Placement verified by: chest auscultation and capnometry   Measured from: lips  Number of attempts at approach: 1  Assessment: lips, teeth, and gum same as pre-op and atraumatic intubation

## 2023-10-16 NOTE — ANESTHESIA POSTPROCEDURE EVALUATION
Patient: Carol Vega    Procedure Summary       Date: 10/16/23 Room / Location: [facility] OR  /  [facility]    Anesthesia Start: 0918 Anesthesia Stop: 1017    Procedure: CHOLECYSTECTOMY LAPAROSCOPIC (Abdomen) Diagnosis:       Calculus of gallbladder without cholecystitis without obstruction      (Calculus of gallbladder without cholecystitis without obstruction [K80.20])    Surgeons: Noemy Tsang MD Provider: Felix Way CRNA    Anesthesia Type: general ASA Status: 3            Anesthesia Type: general    Vitals  No vitals data found for the desired time range.          Post Anesthesia Care and Evaluation    Patient location during evaluation: PACU  Patient participation: complete - patient participated  Level of consciousness: awake and alert  Pain score: 2  Pain management: satisfactory to patient    Airway patency: patent  Anesthetic complications: No anesthetic complications  PONV Status: none  Cardiovascular status: acceptable and stable  Respiratory status: acceptable and spontaneous ventilation  Hydration status: acceptable    Comments: Vitals signs as noted in nursing documentation as per protocol.

## 2023-10-16 NOTE — OP NOTE
PROCEDURE DATE: 10/16/2023    SURGEON: Noemy Tsang MD, FACS    PREOPERATIVE DIAGNOSIS: Calculus of gallbladder without cholecystitis without obstruction [K80.20]    POSTOPERATIVE DIAGNOSIS: same    PROCEDURE: Laparoscopic cholecystectomy    ANESTHESIA: general    EBL: 3mL    IMPLANTS:  Implant Name Type Inv. Item Serial No.  Lot No. LRB No. Used Action   CLIPAPPLR M/ ENDO [id number] 5MM 33CM MD/LG - [id number] Implant CLIPAPPLR M/ ENDO [id number] 5MM 33CM MD/LG  [employer]  DIV OF J AND J [id number] N/A 1 Implanted       SPECIMENS:           Specimens       ID Source Type Tests Collected By Collected At Frozen?    A Gallbladder Tissue TISSUE EXAM, [facility] ([facility])   Milady Nunez RN 10/16/23 0958 No    This specimen was not marked as sent.       INDICATIONS FOR THE PROCEDURE: Ms. Vega is a 23-year-old female patient previously seen in my office with symptoms of recurrent biliary colic and multiple ultrasound studies demonstrating gallstones.  Laparoscopic cholecystectomy was recommended, but deferred until she could complete her pregnancy.  She is now postpartum, and presents for scheduled laparoscopic cholecystectomy for definitive management of symptoms related to her underlying gallbladder disease.  She has been counseled appropriately and has provided her informed consent to proceed.    DESCRIPTION OF THE PROCEDURE:  The patient was seen and examined in the preoperative holding area on the day of surgery and there are no changes to the medical history.  The patient was then taken to the operating room and placed supine on the operating table where a timeout is performed using the WHO checklist.  The patient received appropriate preoperative antibiotics as well as subcutaneous heparin for DVT prophylaxis.    Following the satisfactory induction of general anesthesia the patient's abdomen was prepped and draped in the usual sterile fashion.  A vertical incision was made just above the  umbilicus and was carried through the soft tissue to the level of the fascia.  The fascia was grasped and elevated between Kocher clamps and incised sharply with a knife.  Entry was confirmed into the peritoneum visually and there was no bowel visible in the vicinity of this incision.  Two stay sutures of 0 Vicryl were placed in either side of the fascia and a Bal cannula was inserted under direct visualization.  The sutures were used to secure the cannula.    The abdomen was insufflated with carbon dioxide to a pressure of 15 mmHg and the laparoscope was introduced.  The abdomen was inspected and no injuries were noted from initial trocar placement.  Three additional 5 mm ports were then placed in the subxiphoid, right subcostal, and right upper quadrant positions under direct visualization.  The patient was then placed into the reverse Trendelenburg position with the left side down.    Filmy adhesions between the gallbladder and omentum were freed using blunt dissection.  The dome of the gallbladder was then grasped and retracted cephalad up over the dome of the liver.  The infundibulum of the gallbladder was grasped and retracted laterally in order to splay out the triangle of Calot.  The peritoneum overlying the gallbladder infundibulum was incised with Bovie electrocautery and the cystic duct and cystic artery were identified and circumferentially skeletonized.  The cystic duct and cystic artery were completely circumferentially dissected until a critical view of safety was obtained and once this was done they were doubly clipped and divided close to the gallbladder.    The gallbladder was then dissected free from its peritoneal attachments to the liver using Bovie electrocautery.  Hemostasis was ensured using electrocautery.  Once the gallbladder was completed freed from the undersurface of liver was placed into an Endo Catch bag.  The gallbladder fossa was then reinspected and copiously irrigated with  saline and hemostasis was ensured.  The cystic duct stump and cystic artery stump were reinspected and noted to be secure.  Following this the upper abdominal trocars were removed under direct visualization and no bleeding was noted.  The laparoscope was withdrawn and the abdomen was desufflated.  The Bal cannula was removed out of the umbilical port site followed by the gallbladder which was completely contained within the Endo Catch bag.  This was passed off the field as specimen.  The fascia of the umbilical port site was then closed using a 0 Vicryl suture placed in a figure-of-eight fashion ×2.  The skin of all incisions was closed using a 5-0 PDS suture placed in a subcuticular fashion.  0.25% Marcaine was injected into the wounds for postoperative analgesia.  Mastisol and steri strips were applied to the wounds and covered with dry sterile dressings.    There were no immediate complications noted and the procedure was well tolerated by the patient.  All sponge, needle,  and instrument  counts were correct at the conclusion of procedure.  Dr. Tsang was present scrubbed for the procedure and its entirety.  The patient was awakened from anesthesia and taken to the recovery room in good condition.

## 2023-10-18 LAB — REF LAB TEST METHOD: NORMAL

## 2023-10-18 NOTE — PROGRESS NOTES
"Subjective   Carol Vega is a 23 y.o. female.   Chief Complaint   Patient presents with    Post-op Follow-up     Lap Helene        History of Present Illness     Ms. Vega returns the office today for routine postoperative follow-up after recently undergoing a laparoscopic cholecystectomy.  As expected, final pathology demonstrated cholelithiasis with chronic cholecystitis.  She reports that she is recovering well overall.  She has had some redness and drainage from the umbilical port site incision.  She denies fever, chills, nausea, vomiting, or diarrhea.  She reports good dietary tolerance.    The following portions of the patient's history were reviewed and updated as appropriate: allergies, current medications, past family history, past medical history, past social history, past surgical history, and problem list.    Review of Systems    Objective   /60   Pulse 86   Temp 97.6 °F (36.4 °C)   Ht 160 cm (63\")   Wt 107 kg (236 lb 6.4 oz)   LMP 12/27/2022   SpO2 97%   BMI 41.88 kg/m²     Physical Exam  Constitutional:       Appearance: She is well-developed.   HENT:      Head: Normocephalic and atraumatic.   Eyes:      General: No scleral icterus.  Cardiovascular:      Rate and Rhythm: Regular rhythm.   Pulmonary:      Effort: Pulmonary effort is normal.   Abdominal:      General: There is no distension.      Palpations: Abdomen is soft.      Tenderness: There is no abdominal tenderness.      Comments: 5 mm port sites healing well.  Umbilical port site with some superficial skin separation, and scant serosanguineous drainage.  No evidence of surgical site infection.   Skin:     General: Skin is warm and dry.   Neurological:      Mental Status: She is alert and oriented to person, place, and time.   Psychiatric:         Behavior: Behavior normal.           Assessment & Plan   Diagnoses and all orders for this visit:    1. S/P laparoscopic cholecystectomy (Primary)      I had the pleasure of seeing Carol " Gary in follow-up today for thefirst  postoperative visit following laparoscopic cholecystectomy for  cholelithiasis with chronic cholecystitis .  Overall, Carol Vega  is enjoying uncomplicated recovery.  We discussed ongoing care of her laparoscopic port site incisions.  I do not anticipate any ongoing surgical issues and will return the patient back to the care of their PCP.  I have released Carol Vega to unrestricted physical activity beginning four weeks after her operative date. The patient may follow up in this office as needed.

## 2023-10-24 ENCOUNTER — OFFICE VISIT (OUTPATIENT)
Dept: SURGERY | Facility: CLINIC | Age: 23
End: 2023-10-24
Payer: COMMERCIAL

## 2023-10-24 VITALS
WEIGHT: 236.4 LBS | BODY MASS INDEX: 41.89 KG/M2 | TEMPERATURE: 97.6 F | HEIGHT: 63 IN | HEART RATE: 86 BPM | OXYGEN SATURATION: 97 % | SYSTOLIC BLOOD PRESSURE: 118 MMHG | DIASTOLIC BLOOD PRESSURE: 60 MMHG

## 2023-10-24 DIAGNOSIS — Z90.49 S/P LAPAROSCOPIC CHOLECYSTECTOMY: Primary | ICD-10-CM

## 2023-10-24 PROCEDURE — 99024 POSTOP FOLLOW-UP VISIT: CPT | Performed by: SURGERY

## 2023-10-24 PROCEDURE — 1159F MED LIST DOCD IN RCRD: CPT | Performed by: SURGERY

## 2023-10-24 PROCEDURE — 1160F RVW MEDS BY RX/DR IN RCRD: CPT | Performed by: SURGERY

## 2023-10-30 ENCOUNTER — APPOINTMENT (OUTPATIENT)
Dept: ULTRASOUND IMAGING | Facility: HOSPITAL | Age: 23
End: 2023-10-30
Payer: COMMERCIAL

## 2023-10-30 ENCOUNTER — HOSPITAL ENCOUNTER (EMERGENCY)
Facility: HOSPITAL | Age: 23
Discharge: HOME | End: 2023-10-30
Attending: EMERGENCY MEDICINE | Admitting: EMERGENCY MEDICINE
Payer: COMMERCIAL

## 2023-10-30 VITALS
BODY MASS INDEX: 41.89 KG/M2 | WEIGHT: 236.4 LBS | TEMPERATURE: 98.4 F | SYSTOLIC BLOOD PRESSURE: 134 MMHG | RESPIRATION RATE: 18 BRPM | HEIGHT: 63 IN | OXYGEN SATURATION: 100 % | DIASTOLIC BLOOD PRESSURE: 96 MMHG | HEART RATE: 103 BPM

## 2023-10-30 DIAGNOSIS — N93.9 VAGINAL BLEEDING: Primary | ICD-10-CM

## 2023-10-30 LAB
ALBUMIN SERPL-MCNC: 4 G/DL (ref 3.5–5.2)
ALBUMIN/GLOB SERPL: 1.7 G/DL
ALP SERPL-CCNC: 118 U/L (ref 39–117)
ALT SERPL W P-5'-P-CCNC: 12 U/L (ref 1–33)
ANION GAP SERPL CALCULATED.3IONS-SCNC: 9.3 MMOL/L (ref 5–15)
AST SERPL-CCNC: 14 U/L (ref 1–32)
BACTERIA UR QL AUTO: ABNORMAL /HPF
BASOPHILS # BLD AUTO: 0.04 10*3/MM3 (ref 0–0.2)
BASOPHILS NFR BLD AUTO: 0.4 % (ref 0–1.5)
BILIRUB SERPL-MCNC: 0.4 MG/DL (ref 0–1.2)
BILIRUB UR QL STRIP: NEGATIVE
BUN SERPL-MCNC: 11 MG/DL (ref 6–20)
BUN/CREAT SERPL: 15.7 (ref 7–25)
CALCIUM SPEC-SCNC: 9.3 MG/DL (ref 8.6–10.5)
CHLORIDE SERPL-SCNC: 107 MMOL/L (ref 98–107)
CLARITY UR: CLEAR
CO2 SERPL-SCNC: 23.7 MMOL/L (ref 22–29)
COLOR UR: YELLOW
CREAT SERPL-MCNC: 0.7 MG/DL (ref 0.57–1)
DEPRECATED RDW RBC AUTO: 41.8 FL (ref 37–54)
EGFRCR SERPLBLD CKD-EPI 2021: 126.4 ML/MIN/1.73
EOSINOPHIL # BLD AUTO: 0.39 10*3/MM3 (ref 0–0.4)
EOSINOPHIL NFR BLD AUTO: 4.1 % (ref 0.3–6.2)
ERYTHROCYTE [DISTWIDTH] IN BLOOD BY AUTOMATED COUNT: 14.7 % (ref 12.3–15.4)
GLOBULIN UR ELPH-MCNC: 2.4 GM/DL
GLUCOSE SERPL-MCNC: 100 MG/DL (ref 65–99)
GLUCOSE UR STRIP-MCNC: NEGATIVE MG/DL
HCT VFR BLD AUTO: 27.5 % (ref 34–46.6)
HGB BLD-MCNC: 9.1 G/DL (ref 12–15.9)
HGB UR QL STRIP.AUTO: ABNORMAL
HYALINE CASTS UR QL AUTO: ABNORMAL /LPF
IMM GRANULOCYTES # BLD AUTO: 0.06 10*3/MM3 (ref 0–0.05)
IMM GRANULOCYTES NFR BLD AUTO: 0.6 % (ref 0–0.5)
KETONES UR QL STRIP: NEGATIVE
LEUKOCYTE ESTERASE UR QL STRIP.AUTO: NEGATIVE
LYMPHOCYTES # BLD AUTO: 2.78 10*3/MM3 (ref 0.7–3.1)
LYMPHOCYTES NFR BLD AUTO: 28.9 % (ref 19.6–45.3)
MCH RBC QN AUTO: 26 PG (ref 26.6–33)
MCHC RBC AUTO-ENTMCNC: 33.1 G/DL (ref 31.5–35.7)
MCV RBC AUTO: 78.6 FL (ref 79–97)
MONOCYTES # BLD AUTO: 0.55 10*3/MM3 (ref 0.1–0.9)
MONOCYTES NFR BLD AUTO: 5.7 % (ref 5–12)
MUCOUS THREADS URNS QL MICRO: ABNORMAL /HPF
NEUTROPHILS NFR BLD AUTO: 5.8 10*3/MM3 (ref 1.7–7)
NEUTROPHILS NFR BLD AUTO: 60.3 % (ref 42.7–76)
NITRITE UR QL STRIP: NEGATIVE
NRBC BLD AUTO-RTO: 0 /100 WBC (ref 0–0.2)
PH UR STRIP.AUTO: 5.5 [PH] (ref 5–8)
PLATELET # BLD AUTO: 242 10*3/MM3 (ref 140–450)
PMV BLD AUTO: 10.3 FL (ref 6–12)
POTASSIUM SERPL-SCNC: 4 MMOL/L (ref 3.5–5.2)
PROT SERPL-MCNC: 6.4 G/DL (ref 6–8.5)
PROT UR QL STRIP: NEGATIVE
RBC # BLD AUTO: 3.5 10*6/MM3 (ref 3.77–5.28)
RBC # UR STRIP: ABNORMAL /HPF
REF LAB TEST METHOD: ABNORMAL
SODIUM SERPL-SCNC: 140 MMOL/L (ref 136–145)
SP GR UR STRIP: 1.02 (ref 1–1.03)
SQUAMOUS #/AREA URNS HPF: ABNORMAL /HPF
UROBILINOGEN UR QL STRIP: ABNORMAL
WBC # UR STRIP: ABNORMAL /HPF
WBC NRBC COR # BLD: 9.62 10*3/MM3 (ref 3.4–10.8)

## 2023-10-30 PROCEDURE — 81001 URINALYSIS AUTO W/SCOPE: CPT | Performed by: NURSE PRACTITIONER

## 2023-10-30 PROCEDURE — 85025 COMPLETE CBC W/AUTO DIFF WBC: CPT | Performed by: NURSE PRACTITIONER

## 2023-10-30 PROCEDURE — 80053 COMPREHEN METABOLIC PANEL: CPT | Performed by: NURSE PRACTITIONER

## 2023-10-30 PROCEDURE — 99284 EMERGENCY DEPT VISIT MOD MDM: CPT

## 2023-10-30 PROCEDURE — 76830 TRANSVAGINAL US NON-OB: CPT

## 2023-10-30 NOTE — ED PROVIDER NOTES
Subjective:  History of Present Illness:    Patient is a 23-year-old female with history of recent pregnancy and endometriosis.  Patient reports that she gave birth 1 month ago.  Reports that she has been having abnormal vaginal bleeding.  Reports that she has been bleeding through a tampon and pad.  Denies shortness of air or chest pain.  Denies any pain.  Denies OTC medication or home remedy.  Denies alleviating exacerbating factors.    Nurses Notes reviewed and agree, including vitals, allergies, social history and prior medical history.     REVIEW OF SYSTEMS: All systems reviewed and not pertinent unless noted.  Review of Systems   Genitourinary:  Positive for vaginal bleeding.   All other systems reviewed and are negative.      Past Medical History:   Diagnosis Date    Anxiety     Chlamydia     Endometriosis     Gonorrhea     Kidney stone     Major depression     Migraine     PMS (premenstrual syndrome)     PTSD (post-traumatic stress disorder)     Seizures     trauma induced years ago    Self-injurious behavior     hx of cutting starting at age 15    Suicide attempt     3/25/19-overdose attempt, 2017 overdose attempt    Urinary tract infection        Allergies:    Iodine and Latex      Past Surgical History:   Procedure Laterality Date     SECTION N/A 2023    Procedure:  SECTION PRIMARY;  Surgeon: Ludwig Hernandez MD;  Location: [facility] OR;  Service: Obstetrics/Gynecology;  Laterality: N/A;    CHOLECYSTECTOMY N/A 10/16/2023    Procedure: CHOLECYSTECTOMY LAPAROSCOPIC;  Surgeon: Noemy Tsang MD;  Location: [facility] OR;  Service: General;  Laterality: N/A;    ESOPHAGEAL DILATATION      TONSILLECTOMY           Social History     Socioeconomic History    Marital status: Single   Tobacco Use    Smoking status: Every Day     Types: Electronic Cigarette    Smokeless tobacco: Never    Tobacco comments:     vapes   Vaping Use    Vaping Use: Every day    Substances: Nicotine   Substance and  "Sexual Activity    Alcohol use: Never    Drug use: Not Currently     Types: Marijuana     Comment: not smoked in 3 years    Sexual activity: Yes     Partners: Male     Birth control/protection: None         Family History   Problem Relation Age of Onset    Rheum arthritis Mother     Depression Mother     Drug abuse Mother     Heart murmur Father     Drug abuse Father     Alcohol abuse Father        Objective  Physical Exam:  /68   Pulse 103   Temp 98.4 °F (36.9 °C) (Oral)   Resp 18   Ht 160 cm (63\")   Wt 107 kg (236 lb 6.4 oz)   LMP 12/27/2022 Comment: Gave birth 9/28/23  SpO2 98%   BMI 41.88 kg/m²      Physical Exam  Vitals and nursing note reviewed.   Constitutional:       Appearance: Normal appearance. She is normal weight.   HENT:      Head: Normocephalic and atraumatic.      Nose: Nose normal.      Mouth/Throat:      Mouth: Mucous membranes are moist.      Pharynx: Oropharynx is clear.   Eyes:      Extraocular Movements: Extraocular movements intact.      Conjunctiva/sclera: Conjunctivae normal.      Pupils: Pupils are equal, round, and reactive to light.   Pulmonary:      Effort: Pulmonary effort is normal.      Breath sounds: Normal breath sounds.   Abdominal:      General: Abdomen is flat. Bowel sounds are normal.      Palpations: Abdomen is soft.   Musculoskeletal:         General: Normal range of motion.      Cervical back: Normal range of motion and neck supple.   Skin:     General: Skin is warm and dry.      Capillary Refill: Capillary refill takes less than 2 seconds.   Neurological:      General: No focal deficit present.      Mental Status: She is alert and oriented to person, place, and time. Mental status is at baseline.   Psychiatric:         Mood and Affect: Mood normal.         Behavior: Behavior normal.         Thought Content: Thought content normal.         Judgment: Judgment normal.         Procedures    ED Course:    ED Course as of 10/30/23 1659   Mon Oct 30, 2023   1403 " Hemoglobin(!): 9.1 [TW]      ED Course User Index  [TW] EvertonRolfreyna MARQUIS, GWEN       Lab Results (last 24 hours)       Procedure Component Value Units Date/Time    Urinalysis With Microscopic If Indicated (No Culture) - Urine, Clean Catch [[id number]]  (Abnormal) Collected: 10/30/23 1332    Specimen: Urine, Clean Catch Updated: 10/30/23 1339     Color, UA Yellow     Appearance, UA Clear     pH, UA 5.5     Specific Gravity, UA 1.018     Glucose, UA Negative     Ketones, UA Negative     Bilirubin, UA Negative     Blood, UA Large (3+)     Protein, UA Negative     Leuk Esterase, UA Negative     Nitrite, UA Negative     Urobilinogen, UA 0.2 E.U./dL    Urinalysis, Microscopic Only - Urine, Clean Catch [[id number]]  (Abnormal) Collected: 10/30/23 1332    Specimen: Urine, Clean Catch Updated: 10/30/23 1343     RBC, UA 6-10 /HPF      WBC, UA None Seen /HPF      Bacteria, UA None Seen /HPF      Squamous Epithelial Cells, UA 0-2 /HPF      Hyaline Casts, UA None Seen /LPF      Mucus, UA Trace /HPF      Methodology Manual Light Microscopy    CBC & Differential [[id number]]  (Abnormal) Collected: 10/30/23 1341    Specimen: Blood Updated: 10/30/23 1346    Narrative:      The following orders were created for panel order CBC & Differential.  Procedure                               Abnormality         Status                     ---------                               -----------         ------                     CBC Auto Differential[[id number]]        Abnormal            Final result                 Please view results for these tests on the individual orders.    Comprehensive Metabolic Panel [[id number]]  (Abnormal) Collected: 10/30/23 1341    Specimen: Blood Updated: 10/30/23 1406     Glucose 100 mg/dL      BUN 11 mg/dL      Creatinine 0.70 mg/dL      Sodium 140 mmol/L      Potassium 4.0 mmol/L      Chloride 107 mmol/L      CO2 23.7 mmol/L      Calcium 9.3 mg/dL      Total Protein 6.4 g/dL      Albumin 4.0 g/dL      ALT (SGPT) 12 U/L       AST (SGOT) 14 U/L      Alkaline Phosphatase 118 U/L      Total Bilirubin 0.4 mg/dL      Globulin 2.4 gm/dL      A/G Ratio 1.7 g/dL      BUN/Creatinine Ratio 15.7     Anion Gap 9.3 mmol/L      eGFR 126.4 mL/min/1.73     Narrative:      GFR Normal >60  Chronic Kidney Disease <60  Kidney Failure <15      CBC Auto Differential [[id number]]  (Abnormal) Collected: 10/30/23 1341    Specimen: Blood Updated: 10/30/23 1346     WBC 9.62 10*3/mm3      RBC 3.50 10*6/mm3      Hemoglobin 9.1 g/dL      Hematocrit 27.5 %      MCV 78.6 fL      MCH 26.0 pg      MCHC 33.1 g/dL      RDW 14.7 %      RDW-SD 41.8 fl      MPV 10.3 fL      Platelets 242 10*3/mm3      Neutrophil % 60.3 %      Lymphocyte % 28.9 %      Monocyte % 5.7 %      Eosinophil % 4.1 %      Basophil % 0.4 %      Immature Grans % 0.6 %      Neutrophils, Absolute 5.80 10*3/mm3      Lymphocytes, Absolute 2.78 10*3/mm3      Monocytes, Absolute 0.55 10*3/mm3      Eosinophils, Absolute 0.39 10*3/mm3      Basophils, Absolute 0.04 10*3/mm3      Immature Grans, Absolute 0.06 10*3/mm3      nRBC 0.0 /100 WBC              US Non-ob Transvaginal    Result Date: 10/30/2023  PROCEDURE: US NON-OB TRANSVAGINAL-  HISTORY: excessive transvaginal bleeding 1 month out from ultrasound  Transabdominal pelvic exam: Uterus grossly unremarkable.  Transvaginal pelvic exam: Uterus no obvious retained products. Endometrial stripe measures 12 mm. There may be some minimal fluid in the cervical canal.  Ovary show normal and size morphology.  There is no free fluid.      Impression: No significant free fluid or obvious retained products.   This report was signed and finalized on 10/30/2023 3:56 PM by Ahsan Shankar MD.          MDM      Initial impression of presenting illness: Patient is a 23-year-old female with history of recent pregnancy and endometriosis.  Patient reports that she gave birth 1 month ago.  Reports that she has been having abnormal vaginal bleeding.  Reports that she has been  bleeding through a tampon and pad.  Denies shortness of air or chest pain.  Denies any pain.  Denies OTC medication or home remedy.  Denies alleviating exacerbating factors.    DDX: includes but is not limited to: Hormonal imbalance, menstrual cycle, boggy uterus    Patient arrives stable with vitals interpreted by myself.     Pertinent features from physical exam: Lung sounds clear bilaterally throughout.  Abdomen soft nontender.  Bowel sounds normal.  Cardiac sounds normal..    Initial diagnostic plan: CBC, CMP, UA, transvaginal ultrasound    Results from initial plan were reviewed and interpreted by me revealing CBC with mild anemia.  CMP is within normal parameters.  UA with 3+ blood.  Transvaginal ultrasound with the following impression no significant free fluid or obvious retained products    Diagnostic information from other sources: Chart review    Interventions / Re-evaluation: Signs stable throughout encounter    Results/clinical rationale were discussed with patient    Consultations/Discussion of results with other physicians: N/A    Disposition plan: Patient is hemodynamically stable nontoxic-appearing appropriate discharge.  Will have patient follow-up with OB/GYN first thing tomorrow morning.  To follow-up with PCP in 1 week.  Follow-up with ER for new or worsening symptoms.  -----        Final diagnoses:   Vaginal bleeding          Parth Toscano, APRN  10/30/23 1700

## 2023-10-30 NOTE — DISCHARGE INSTRUCTIONS
Please follow-up with your OB/GYN first thing in the morning.  Follow-up with ER for new or worsening symptoms.  Follow-up with your PCP in 1 week.

## 2023-11-13 ENCOUNTER — POSTPARTUM VISIT (OUTPATIENT)
Dept: OBSTETRICS AND GYNECOLOGY | Facility: CLINIC | Age: 23
End: 2023-11-13
Payer: COMMERCIAL

## 2023-11-13 VITALS
SYSTOLIC BLOOD PRESSURE: 120 MMHG | WEIGHT: 238 LBS | DIASTOLIC BLOOD PRESSURE: 80 MMHG | BODY MASS INDEX: 42.17 KG/M2 | HEIGHT: 63 IN

## 2023-11-13 DIAGNOSIS — Z30.011 ENCOUNTER FOR INITIAL PRESCRIPTION OF CONTRACEPTIVE PILLS: Primary | ICD-10-CM

## 2023-11-13 DIAGNOSIS — Z12.4 SCREENING FOR CERVICAL CANCER: ICD-10-CM

## 2023-11-13 PROBLEM — O42.90 PROLONGED RUPTURE OF MEMBRANES: Status: RESOLVED | Noted: 2023-09-28 | Resolved: 2023-11-13

## 2023-11-13 PROBLEM — O61.8 OTHER FAILED INDUCTION OF LABOR: Status: RESOLVED | Noted: 2023-09-28 | Resolved: 2023-11-13

## 2023-11-13 PROBLEM — Z98.891 S/P CESAREAN SECTION: Status: RESOLVED | Noted: 2023-09-28 | Resolved: 2023-11-13

## 2023-11-13 PROCEDURE — 0503F POSTPARTUM CARE VISIT: CPT | Performed by: MIDWIFE

## 2023-11-13 RX ORDER — FERROUS SULFATE 324(65)MG
324 TABLET, DELAYED RELEASE (ENTERIC COATED) ORAL 2 TIMES DAILY WITH MEALS
Qty: 60 TABLET | Refills: 1 | Status: SHIPPED | OUTPATIENT
Start: 2023-11-13

## 2023-11-13 RX ORDER — NORETHINDRONE ACETATE AND ETHINYL ESTRADIOL 1MG-20(21)
1 KIT ORAL DAILY
Qty: 28 TABLET | Refills: 12 | Status: SHIPPED | OUTPATIENT
Start: 2023-11-13 | End: 2024-11-12

## 2023-11-13 NOTE — PROGRESS NOTES
"History  Chief Complaint   Patient presents with    Postpartum Care     Patient due for pap         Simnancy Vega is a 23 y.o. year old  presenting to be seen for her postpartum visit.  She had a Primary  (LTCS).  She had a cholecystectomy 2 weeks after she delivered.    Since delivery she has been sexually active. She has used condoms every time.  She does not have concerns about post-partum blues/depression. She feels sad some days.  She is bottle feeding.  For ongoing contraception, her plans are OCP's.  She has not had a menstrual cycle. She went to ED on 10/30 with heavy bleeding with clots. She was anemic and placed on Iron supplement.         Physical  /80   Ht 160 cm (63\")   Wt 108 kg (238 lb)   LMP 2022 Comment: Gave birth 25  Breastfeeding No   BMI 42.16 kg/m²     General:  well developed; well nourished  no acute distress   Abdomen: soft, non-tender; no masses  incision is healed   Pelvis: External genitalia:  normal appearance of the external genitalia including Bartholin's and [place]'s glands.  Vaginal:  normal pink mucosa without prolapse or lesions. discharge present -  pinkish brown;  Cervix:  normal appearance.  Uterus:  normal size, shape and consistency.  Adnexa:  normal bimanual exam of the adnexa.        Assessment   Normal 6 week postpartum exam  Contraceptive management     Plan   BC options reviewed and compared today: OCP (estrogen/progesterone) She was instructed how to start her birth control.  It should be started on  following the onset of her next cycle.  The patient was educated regarding the correct and consistent use.  Because it may take 1 month to become effective, the use of alternative contraception for one month was stressed.  The potential for breakthrough bleeding for up to 4 cycles was also emphasized.  In addition, the patient was counseled regarding hormonal contraception not providing protecting against sexually transmitted " infections and the need for safe sex practices.  Pap smear done  Follow up 1 year    New Medications Ordered This Visit   Medications    ferrous sulfate 324 (65 Fe) MG tablet delayed-release EC tablet     Sig: Take 1 tablet by mouth 2 (Two) Times a Day With Meals.     Dispense:  60 tablet     Refill:  1    norethindrone-ethinyl estradiol FE (Junel FE 1/20) 1-20 MG-MCG per tablet     Sig: Take 1 tablet by mouth Daily.     Dispense:  28 tablet     Refill:  12          This note was electronically signed.  Heather Joyner, APRN  11/13/2023

## 2023-11-14 ENCOUNTER — TELEPHONE (OUTPATIENT)
Dept: OBSTETRICS AND GYNECOLOGY | Facility: CLINIC | Age: 23
End: 2023-11-14
Payer: COMMERCIAL

## 2023-11-14 RX ORDER — AZITHROMYCIN 500 MG/1
1000 TABLET, FILM COATED ORAL ONCE
Qty: 2 TABLET | Refills: 0 | Status: SHIPPED | OUTPATIENT
Start: 2023-11-14 | End: 2023-11-14

## 2023-11-14 NOTE — TELEPHONE ENCOUNTER
I sent Rx for Azithromycin 1 gm po to [facility]. Her partner will need to be treated. No sex for 7 days after both are treated. Will need LEIA   Upon review of the In Basket request and the patient's chart, initial outreach has been made via telephone call, please see Contacts section for details       Thank you  Silvia Xiong

## 2023-11-20 LAB — REF LAB TEST METHOD: NORMAL

## 2023-11-30 ENCOUNTER — TELEPHONE (OUTPATIENT)
Dept: SURGERY | Facility: CLINIC | Age: 23
End: 2023-11-30

## 2023-11-30 NOTE — TELEPHONE ENCOUNTER
Provider: DR. JAUREGUI    Caller: NOÉ DYKES    Relationship to Patient: SELF    Pharmacy: [facility]     Phone Number: [phone]      Reason for Call: INCISIONAL PAIN    When was the patient last seen: 10.24.23     When did it start: 11.30.23    Where is it located: ABDOMEN    Characteristics of symptom/severity: STITCH HAS COME THRU THE SKIN WHERE THERE WAS A BUMP NEXT TO INCISION ON ABDOMEN    Timing- Is it constant or intermittent:     What makes it worse:     What makes it better:     What therapies/medications have you tried:

## 2023-12-03 ENCOUNTER — APPOINTMENT (OUTPATIENT)
Dept: GENERAL RADIOLOGY | Facility: HOSPITAL | Age: 23
End: 2023-12-03
Payer: COMMERCIAL

## 2023-12-03 ENCOUNTER — HOSPITAL ENCOUNTER (EMERGENCY)
Facility: HOSPITAL | Age: 23
Discharge: HOME | End: 2023-12-04
Attending: EMERGENCY MEDICINE | Admitting: EMERGENCY MEDICINE
Payer: COMMERCIAL

## 2023-12-03 VITALS
OXYGEN SATURATION: 99 % | DIASTOLIC BLOOD PRESSURE: 65 MMHG | SYSTOLIC BLOOD PRESSURE: 125 MMHG | WEIGHT: 233 LBS | BODY MASS INDEX: 41.29 KG/M2 | HEIGHT: 63 IN | HEART RATE: 94 BPM | RESPIRATION RATE: 18 BRPM | TEMPERATURE: 97.9 F

## 2023-12-03 DIAGNOSIS — T18.9XXA SWALLOWED FOREIGN BODY, INITIAL ENCOUNTER: Primary | ICD-10-CM

## 2023-12-03 PROCEDURE — 99282 EMERGENCY DEPT VISIT SF MDM: CPT

## 2023-12-03 PROCEDURE — 74018 RADEX ABDOMEN 1 VIEW: CPT

## 2023-12-04 NOTE — ED PROVIDER NOTES
TRIAGE CHIEF COMPLAINT:     Nursing and triage notes reviewed    Chief Complaint   Patient presents with    Swallowed Foreign Body      HPI: Carol Vega is a 23 y.o. female who presents to the emergency department complaining of a swallowed foreign body.  Patient states that she accidentally swallowed part of her tongue green shortly prior to arrival.  She states that she googled and called the hospital and was told she might need to be evaluated.  She states she has no symptoms.  No shortness of breath, choking, abdominal pain.    REVIEW OF SYSTEMS: All other systems reviewed and are negative     PAST MEDICAL HISTORY:   Past Medical History:   Diagnosis Date    Anxiety     Chlamydia     Endometriosis     Gonorrhea     Kidney stone     Major depression     Migraine     PMS (premenstrual syndrome)     PTSD (post-traumatic stress disorder)     Seizures     trauma induced years ago    Self-injurious behavior     hx of cutting starting at age 15    Suicide attempt     3/25/19-overdose attempt, 2017 overdose attempt    Urinary tract infection         FAMILY HISTORY:   Family History   Problem Relation Age of Onset    Rheum arthritis Mother     Depression Mother     Drug abuse Mother     Heart murmur Father     Drug abuse Father     Alcohol abuse Father         SOCIAL HISTORY:   Social History     Socioeconomic History    Marital status: Single   Tobacco Use    Smoking status: Every Day     Types: Electronic Cigarette    Smokeless tobacco: Never    Tobacco comments:     vapes   Vaping Use    Vaping Use: Every day    Substances: Nicotine   Substance and Sexual Activity    Alcohol use: Never    Drug use: Not Currently     Types: Marijuana     Comment: not smoked in 3 years    Sexual activity: Yes     Partners: Male     Birth control/protection: None        SURGICAL HISTORY:   Past Surgical History:   Procedure Laterality Date     SECTION N/A 2023    Procedure:  SECTION PRIMARY;  Surgeon: Ludwig Hernandez  MD Pankaj;  Location: [facility];  Service: Obstetrics/Gynecology;  Laterality: N/A;    CHOLECYSTECTOMY N/A 10/16/2023    Procedure: CHOLECYSTECTOMY LAPAROSCOPIC;  Surgeon: Noemy Tsang MD;  Location: [facility];  Service: General;  Laterality: N/A;    ESOPHAGEAL DILATATION      TONSILLECTOMY          CURRENT MEDICATIONS:      Medication List        ASK your doctor about these medications      ferrous sulfate 324 (65 Fe) MG tablet delayed-release EC tablet  Take 1 tablet by mouth 2 (Two) Times a Day With Meals.     norethindrone-ethinyl estradiol FE 1-20 MG-MCG per tablet  Commonly known as: Junel FE 1/20  Take 1 tablet by mouth Daily.               ALLERGIES: Iodine and Latex     PHYSICAL EXAM:   VITAL SIGNS:   Vitals:    12/03/23 2310   BP: 125/65   Pulse: 94   Resp: 18   Temp: 97.9 °F (36.6 °C)   SpO2: 99%      CONSTITUTIONAL: Awake, oriented, appears nontoxic   HENT: Atraumatic, normocephalic, oral mucosa pink and moist, airway patent. Nares patent without drainage. External ears normal.   EYES: Conjunctivae clear   NECK: Trachea midline   CARDIOVASCULAR: Normal heart rate, Normal rhythm, No murmurs, rubs, gallops   PULMONARY/CHEST: Clear to auscultation, no rhonchi, wheezes, or rales. Symmetrical breath sounds.  ABDOMINAL: Nondistended, soft, nontender - no rebound or guarding.   NEUROLOGIC: Nonfocal, moving all four extremities, no gross sensory or motor deficits.   EXTREMITIES: No clubbing, cyanosis, or edema   SKIN: Warm, Dry, No erythema, No rash     ED COURSE / MEDICAL DECISION MAKING:   Carol Vega is a 23 y.o. female who presents to the emergency department for evaluation of a swallowed foreign body.  Patient nondistressed on arrival.  Vital signs are stable.  Exam is unremarkable.    Differential diagnosis includes followed foreign body in stomach, foreign body in esophagus among other etiologies.    KUB was ordered for further evaluation of the patient's presentation.    Diagnostic information from  other sources: Chart review    Interventions: None    Narrative: Presents after swallowed foreign body.  KUB obtained and shows foreign body likely within the stomach or intestine.  No other abnormalities appreciated.  Discussed expectant management with patient.      DECISION TO DISCHARGE/ADMIT: see ED care timeline     FINAL IMPRESSION:   1 --swallowed foreign body  2 --   3 --     Electronically signed by: Lluvia Amin MD, 12/4/2023 01:18 Lluvia Benjamin MD  12/04/23 0122

## 2023-12-14 ENCOUNTER — OFFICE VISIT (OUTPATIENT)
Dept: OBSTETRICS AND GYNECOLOGY | Facility: CLINIC | Age: 23
End: 2023-12-14
Payer: COMMERCIAL

## 2023-12-14 VITALS
DIASTOLIC BLOOD PRESSURE: 68 MMHG | HEIGHT: 63 IN | BODY MASS INDEX: 41.11 KG/M2 | SYSTOLIC BLOOD PRESSURE: 118 MMHG | WEIGHT: 232 LBS

## 2023-12-14 DIAGNOSIS — Z11.3 SCREENING EXAMINATION FOR STD (SEXUALLY TRANSMITTED DISEASE): Primary | ICD-10-CM

## 2023-12-14 NOTE — PROGRESS NOTES
"Chief Complaint   Patient presents with    std screening     LEIA- Chlamydia      Carol Vega is a 23 y.o. year old  presenting to be seen for repeat STD screening.  She did take antibiotic for Chlamydia. Partner did too.  She denies any vaginal discharge.  She hasn't started OCs yet. Was confused when to start them.    History  Past Medical History:   Diagnosis Date    Anxiety     Chlamydia     Endometriosis     Gonorrhea     Kidney stone     Major depression     Migraine     PMS (premenstrual syndrome)     PTSD (post-traumatic stress disorder)     Seizures     trauma induced years ago    Self-injurious behavior     hx of cutting starting at age 15    Suicide attempt     3/25/19-overdose attempt, 2017 overdose attempt    Urinary tract infection    , Allergies:  Iodine and Latex    Social History    Tobacco Use      Smoking status: Every Day        Types: Electronic Cigarette      Smokeless tobacco: Never      Tobacco comments: vapes      Review of Systems  Pertinent items are noted in HPI, all other systems reviewed and negative       Objective   /68   Ht 160 cm (63\")   Wt 105 kg (232 lb)   LMP 2023   BMI 41.10 kg/m²     Physical Exam:  General Appearance: alert, appears stated age, and cooperative  Lungs: respirations regular, respirations even, and respirations unlabored  Abdomen: soft non-tender  Extremities: moves extremities well, no edema, no cyanosis, and no redness  Skin: no bleeding, bruising or rash and no lesions noted  Neurologic: Mental Status orientated to person, place, time and situation, Speech normal content and execusion    Lab Review   STD swabs for GC, chlamydia, and trichimoniasis    Imaging   No data reviewed         Assessment /Plan    Diagnoses and all orders for this visit:    1. Screening examination for STD (sexually transmitted disease) (Primary)  -     NuSwab VG+ - Swab, Cervix        No orders of the defined types were placed in this encounter.    Follow up 1 " year for annual exam           This note was electronically signed.  Heather Joyner CNM  12/14/2023

## 2023-12-18 LAB
A VAGINAE DNA VAG QL NAA+PROBE: NORMAL SCORE
BVAB2 DNA VAG QL NAA+PROBE: NORMAL SCORE
C ALBICANS DNA VAG QL NAA+PROBE: NEGATIVE
C GLABRATA DNA VAG QL NAA+PROBE: NEGATIVE
C TRACH DNA VAG QL NAA+PROBE: NEGATIVE
MEGA1 DNA VAG QL NAA+PROBE: NORMAL SCORE
N GONORRHOEA DNA VAG QL NAA+PROBE: NEGATIVE
T VAGINALIS DNA VAG QL NAA+PROBE: NEGATIVE

## 2023-12-26 RX ORDER — FAMOTIDINE 20 MG/1
20 TABLET, FILM COATED ORAL 2 TIMES DAILY
Qty: 60 TABLET | Refills: 5 | OUTPATIENT
Start: 2023-12-26

## 2024-01-03 ENCOUNTER — TELEPHONE (OUTPATIENT)
Dept: OBSTETRICS AND GYNECOLOGY | Facility: CLINIC | Age: 24
End: 2024-01-03
Payer: COMMERCIAL

## 2024-01-03 DIAGNOSIS — Z34.90 EARLY STAGE OF PREGNANCY: Primary | ICD-10-CM

## 2024-01-03 NOTE — TELEPHONE ENCOUNTER
Caller: Carol Vega    Relationship: Self    Best call back number: [phone]        Who are you requesting to speak with (clinical staff, provider,         What was the call regarding: NEW PATIENT ADVISED THAT SHE IS PREGNANT, DOESN'T KNOW THE LMP

## 2024-01-03 NOTE — TELEPHONE ENCOUNTER
Pt does not know LMP. She had care and delivered at [facility] with last pregnancy. She is transferring care to here. May come in for Hcg at her convenience.

## 2024-01-04 ENCOUNTER — LAB (OUTPATIENT)
Dept: LAB | Facility: HOSPITAL | Age: 24
End: 2024-01-04
Payer: COMMERCIAL

## 2024-01-04 DIAGNOSIS — Z34.90 EARLY STAGE OF PREGNANCY: Primary | ICD-10-CM

## 2024-01-04 LAB — HCG INTACT+B SERPL-ACNC: 3.93 MIU/ML

## 2024-01-04 PROCEDURE — 84702 CHORIONIC GONADOTROPIN TEST: CPT

## 2024-01-04 PROCEDURE — 36415 COLL VENOUS BLD VENIPUNCTURE: CPT

## 2024-01-05 ENCOUNTER — TELEPHONE (OUTPATIENT)
Dept: OBSTETRICS AND GYNECOLOGY | Facility: CLINIC | Age: 24
End: 2024-01-05
Payer: COMMERCIAL

## 2024-01-05 ENCOUNTER — HOSPITAL ENCOUNTER (EMERGENCY)
Facility: HOSPITAL | Age: 24
Discharge: HOME | End: 2024-01-05
Attending: EMERGENCY MEDICINE
Payer: COMMERCIAL

## 2024-01-05 VITALS
SYSTOLIC BLOOD PRESSURE: 125 MMHG | BODY MASS INDEX: 41.11 KG/M2 | HEIGHT: 63 IN | WEIGHT: 232 LBS | HEART RATE: 88 BPM | RESPIRATION RATE: 14 BRPM | OXYGEN SATURATION: 98 % | TEMPERATURE: 98 F | DIASTOLIC BLOOD PRESSURE: 85 MMHG

## 2024-01-05 DIAGNOSIS — D64.9 ANEMIA, UNSPECIFIED TYPE: ICD-10-CM

## 2024-01-05 DIAGNOSIS — N93.9 VAGINAL BLEEDING: Primary | ICD-10-CM

## 2024-01-05 LAB
ALBUMIN SERPL-MCNC: 4.2 G/DL (ref 3.5–5.2)
ALBUMIN/GLOB SERPL: 1.8 G/DL
ALP SERPL-CCNC: 123 U/L (ref 39–117)
ALT SERPL W P-5'-P-CCNC: 48 U/L (ref 1–33)
ANION GAP SERPL CALCULATED.3IONS-SCNC: 9.4 MMOL/L (ref 5–15)
AST SERPL-CCNC: 25 U/L (ref 1–32)
BASOPHILS # BLD AUTO: 0.05 10*3/MM3 (ref 0–0.2)
BASOPHILS NFR BLD AUTO: 0.5 % (ref 0–1.5)
BILIRUB SERPL-MCNC: 0.4 MG/DL (ref 0–1.2)
BUN SERPL-MCNC: 9 MG/DL (ref 6–20)
BUN/CREAT SERPL: 17 (ref 7–25)
CALCIUM SPEC-SCNC: 8.8 MG/DL (ref 8.6–10.5)
CHLORIDE SERPL-SCNC: 106 MMOL/L (ref 98–107)
CO2 SERPL-SCNC: 21.6 MMOL/L (ref 22–29)
CREAT SERPL-MCNC: 0.53 MG/DL (ref 0.57–1)
DEPRECATED RDW RBC AUTO: 38.5 FL (ref 37–54)
EGFRCR SERPLBLD CKD-EPI 2021: 135.1 ML/MIN/1.73
EOSINOPHIL # BLD AUTO: 0.41 10*3/MM3 (ref 0–0.4)
EOSINOPHIL NFR BLD AUTO: 4 % (ref 0.3–6.2)
ERYTHROCYTE [DISTWIDTH] IN BLOOD BY AUTOMATED COUNT: 14.7 % (ref 12.3–15.4)
GLOBULIN UR ELPH-MCNC: 2.4 GM/DL
GLUCOSE SERPL-MCNC: 91 MG/DL (ref 65–99)
HCT VFR BLD AUTO: 30.6 % (ref 34–46.6)
HGB BLD-MCNC: 9.9 G/DL (ref 12–15.9)
HOLD SPECIMEN: NORMAL
HOLD SPECIMEN: NORMAL
IMM GRANULOCYTES # BLD AUTO: 0.05 10*3/MM3 (ref 0–0.05)
IMM GRANULOCYTES NFR BLD AUTO: 0.5 % (ref 0–0.5)
LYMPHOCYTES # BLD AUTO: 2.38 10*3/MM3 (ref 0.7–3.1)
LYMPHOCYTES NFR BLD AUTO: 23.3 % (ref 19.6–45.3)
MCH RBC QN AUTO: 23.3 PG (ref 26.6–33)
MCHC RBC AUTO-ENTMCNC: 32.4 G/DL (ref 31.5–35.7)
MCV RBC AUTO: 72.2 FL (ref 79–97)
MONOCYTES # BLD AUTO: 0.66 10*3/MM3 (ref 0.1–0.9)
MONOCYTES NFR BLD AUTO: 6.5 % (ref 5–12)
NEUTROPHILS NFR BLD AUTO: 6.68 10*3/MM3 (ref 1.7–7)
NEUTROPHILS NFR BLD AUTO: 65.2 % (ref 42.7–76)
NRBC BLD AUTO-RTO: 0 /100 WBC (ref 0–0.2)
PLATELET # BLD AUTO: 339 10*3/MM3 (ref 140–450)
PMV BLD AUTO: 10.1 FL (ref 6–12)
POTASSIUM SERPL-SCNC: 4.4 MMOL/L (ref 3.5–5.2)
PROT SERPL-MCNC: 6.6 G/DL (ref 6–8.5)
RBC # BLD AUTO: 4.24 10*6/MM3 (ref 3.77–5.28)
SODIUM SERPL-SCNC: 137 MMOL/L (ref 136–145)
WBC NRBC COR # BLD AUTO: 10.23 10*3/MM3 (ref 3.4–10.8)
WHOLE BLOOD HOLD COAG: NORMAL
WHOLE BLOOD HOLD SPECIMEN: NORMAL

## 2024-01-05 PROCEDURE — 99282 EMERGENCY DEPT VISIT SF MDM: CPT

## 2024-01-05 PROCEDURE — 36415 COLL VENOUS BLD VENIPUNCTURE: CPT

## 2024-01-05 PROCEDURE — 85025 COMPLETE CBC W/AUTO DIFF WBC: CPT | Performed by: EMERGENCY MEDICINE

## 2024-01-05 PROCEDURE — 80053 COMPREHEN METABOLIC PANEL: CPT

## 2024-01-05 RX ORDER — SODIUM CHLORIDE 0.9 % (FLUSH) 0.9 %
10 SYRINGE (ML) INJECTION AS NEEDED
Status: DISCONTINUED | OUTPATIENT
Start: 2024-01-05 | End: 2024-01-05 | Stop reason: HOSPADM

## 2024-01-05 NOTE — ED PROVIDER NOTES
Subjective  History of Present Illness:    This is a 23-year-old female present emergency room today for evaluation of vaginal bleeding while possibly pregnant.  Had 2 at home positive pregnancy test on .  Unsure when her last period was.  Reports she had had some vaginal bleeding last night that progressed into today.  She reports that she has utilized a couple pads between last night and this morning.  Reports she had some intermittent abdominal cramping but it is since gone now.  No fevers.  No urinary symptoms.  Had hCG blood draw yesterday that was very low and was told to follow-up with 72-hour repeat.      Nurses Notes reviewed and agree, including vitals, allergies, social history and prior medical history.     REVIEW OF SYSTEMS: All systems reviewed and not pertinent unless noted.  Review of Systems   Constitutional:  Negative for fever.   Gastrointestinal:  Negative for abdominal pain.   Genitourinary:  Positive for vaginal bleeding. Negative for dysuria, frequency, hematuria, urgency and vaginal discharge.   All other systems reviewed and are negative.      Past Medical History:   Diagnosis Date    Anxiety     Chlamydia     Endometriosis     Gonorrhea     Kidney stone     Major depression     Migraine     PMS (premenstrual syndrome)     PTSD (post-traumatic stress disorder)     Seizures     trauma induced years ago    Self-injurious behavior     hx of cutting starting at age 15    Suicide attempt     3/25/19-overdose attempt, 2017 overdose attempt    Urinary tract infection        Allergies:    Iodine and Latex      Past Surgical History:   Procedure Laterality Date     SECTION N/A 2023    Procedure:  SECTION PRIMARY;  Surgeon: Ludwig Hernandez MD;  Location: [facility] OR;  Service: Obstetrics/Gynecology;  Laterality: N/A;    CHOLECYSTECTOMY N/A 10/16/2023    Procedure: CHOLECYSTECTOMY LAPAROSCOPIC;  Surgeon: Noemy Tsang MD;  Location: [facility] OR;  Service: General;   "Laterality: N/A;    ESOPHAGEAL DILATATION      TONSILLECTOMY           Social History     Socioeconomic History    Marital status: Single   Tobacco Use    Smoking status: Every Day     Types: Electronic Cigarette    Smokeless tobacco: Never    Tobacco comments:     vapes   Vaping Use    Vaping Use: Every day    Substances: Nicotine   Substance and Sexual Activity    Alcohol use: Never    Drug use: Not Currently     Types: Marijuana     Comment: not smoked in 3 years    Sexual activity: Yes     Partners: Male     Birth control/protection: None         Family History   Problem Relation Age of Onset    Rheum arthritis Mother     Depression Mother     Drug abuse Mother     Heart murmur Father     Drug abuse Father     Alcohol abuse Father        Objective  Physical Exam:  /85   Pulse 88   Temp 98 °F (36.7 °C) (Oral)   Resp 14   Ht 160 cm (63\")   Wt 105 kg (232 lb)   LMP 11/06/2023   SpO2 98%   BMI 41.10 kg/m²      Physical Exam  Vitals and nursing note reviewed.   Constitutional:       General: She is not in acute distress.     Appearance: She is obese. She is not ill-appearing, toxic-appearing or diaphoretic.   HENT:      Head: Normocephalic and atraumatic.      Nose: Nose normal.      Mouth/Throat:      Pharynx: Oropharynx is clear.   Eyes:      Extraocular Movements: Extraocular movements intact.   Cardiovascular:      Comments: Appears well-perfused  Pulmonary:      Effort: Pulmonary effort is normal.   Abdominal:      General: Abdomen is flat. There is no distension.      Tenderness: There is no abdominal tenderness. There is no guarding.   Musculoskeletal:         General: Normal range of motion.      Cervical back: Normal range of motion.   Skin:     General: Skin is warm.      Capillary Refill: Capillary refill takes less than 2 seconds.   Neurological:      General: No focal deficit present.      Mental Status: She is alert and oriented to person, place, and time.   Psychiatric:         Mood and " Affect: Mood normal.         Behavior: Behavior normal.         Thought Content: Thought content normal.         Judgment: Judgment normal.               Procedures    ED Course:         Lab Results (last 24 hours)       Procedure Component Value Units Date/Time    CBC & Differential [[id number]]  (Abnormal) Collected: 01/05/24 1447    Specimen: Blood Updated: 01/05/24 1454    Narrative:      The following orders were created for panel order CBC & Differential.  Procedure                               Abnormality         Status                     ---------                               -----------         ------                     CBC Auto Differential[[id number]]        Abnormal            Final result                 Please view results for these tests on the individual orders.    CBC Auto Differential [[id number]]  (Abnormal) Collected: 01/05/24 1447    Specimen: Blood Updated: 01/05/24 1454     WBC 10.23 10*3/mm3      RBC 4.24 10*6/mm3      Hemoglobin 9.9 g/dL      Hematocrit 30.6 %      MCV 72.2 fL      MCH 23.3 pg      MCHC 32.4 g/dL      RDW 14.7 %      RDW-SD 38.5 fl      MPV 10.1 fL      Platelets 339 10*3/mm3      Neutrophil % 65.2 %      Lymphocyte % 23.3 %      Monocyte % 6.5 %      Eosinophil % 4.0 %      Basophil % 0.5 %      Immature Grans % 0.5 %      Neutrophils, Absolute 6.68 10*3/mm3      Lymphocytes, Absolute 2.38 10*3/mm3      Monocytes, Absolute 0.66 10*3/mm3      Eosinophils, Absolute 0.41 10*3/mm3      Basophils, Absolute 0.05 10*3/mm3      Immature Grans, Absolute 0.05 10*3/mm3      nRBC 0.0 /100 WBC     Comprehensive Metabolic Panel [[id number]]  (Abnormal) Collected: 01/05/24 1447    Specimen: Blood Updated: 01/05/24 1503     Glucose 91 mg/dL      BUN 9 mg/dL      Creatinine 0.53 mg/dL      Sodium 137 mmol/L      Potassium 4.4 mmol/L      Chloride 106 mmol/L      CO2 21.6 mmol/L      Calcium 8.8 mg/dL      Total Protein 6.6 g/dL      Albumin 4.2 g/dL      ALT (SGPT) 48 U/L      AST (SGOT) 25  U/L      Alkaline Phosphatase 123 U/L      Total Bilirubin 0.4 mg/dL      Globulin 2.4 gm/dL      A/G Ratio 1.8 g/dL      BUN/Creatinine Ratio 17.0     Anion Gap 9.4 mmol/L      eGFR 135.1 mL/min/1.73     Narrative:      GFR Normal >60  Chronic Kidney Disease <60  Kidney Failure <15               No radiology results from the last 24 hrs       MDM      Initial impression of presenting illness: This is a 23-year-old female present emergency room today with vaginal bleeding.     DDX: includes but is not limited to: Miscarriage, abnormal uterine bleeding, start of menstrual cycle, others    Patient arrives hemodynamically stable afebrile nontachycardic nonhypoxic nontoxic with vitals interpreted by myself.     Pertinent features from physical exam: Abdomen soft nonreactive, does not appear pale.  Well-perfused.  Alert and orient x 4..    Initial diagnostic plan: CBC CMP    Results from initial plan were reviewed and interpreted by me revealing CMP essentially unremarkable.  CBC reveals stable anemia with a hemoglobin of 9.9 and hematocrit of 30.6.    Diagnostic information from other sources: Old record reviewed.  Patient had hCG draw based on prior records less than 24 hours ago that was 3.93 which is almost in an undetectable range and extremely low.    Interventions / Re-evaluation: No current symptoms    Results/clinical rationale were discussed with patient at bedside.  Patient may have been experiencing a miscarriage given that her hCG is only 3.9 and she has had 2 positive pregnancy test in early January with vaginal bleeding now.  Appears that OB had recommended 72-hour repeat, in the absence of any abdominal tenderness or pelvic pain, do not believe ultrasound at this point would be of benefit on an emergent basis.  Recommended that she follow-up as planned to have the hCG quant repeated within 72 hours.    Consultations/Discussion of results with other physicians: Discussed plan of care with attending  physician    Disposition plan: Discharge.  Follow-up with OB/GYN.  Return precautions given.  Patient understanding of plan at bedside.  -----    Final diagnoses:   Vaginal bleeding   Anemia, unspecified type          Shahriar Cota PA-C  01/05/24 1530

## 2024-01-05 NOTE — DISCHARGE INSTRUCTIONS
Call OB/GYN to schedule follow-up hCG lab drawl and follow-up in office with them.  Return if you start feeling extremely lightheaded or bleeding is uncontrolled or significantly worse.

## 2024-01-05 NOTE — TELEPHONE ENCOUNTER
Caller: Carol Vega    Relationship: Self    Best call back number: [phone]    What orders are you requesting (i.e. lab or imaging): REPEAT HCG LEVELS 01/08    In what timeframe would the patient need to come in: 01/08    Where will you receive your lab/imaging services:  [facility] [place]     Additional notes: PLEASE ADVISE, OK TO CALLBACK ANYTIME, OK TO LEAVE A VM

## 2024-01-13 ENCOUNTER — LAB (OUTPATIENT)
Dept: LAB | Facility: HOSPITAL | Age: 24
End: 2024-01-13
Payer: COMMERCIAL

## 2024-01-13 DIAGNOSIS — Z34.90 EARLY STAGE OF PREGNANCY: Primary | ICD-10-CM

## 2024-01-13 DIAGNOSIS — Z32.00 ENCOUNTER FOR PREGNANCY TEST, RESULT UNKNOWN: ICD-10-CM

## 2024-01-13 LAB — HCG INTACT+B SERPL-ACNC: <0.1 MIU/ML

## 2024-01-13 PROCEDURE — 36415 COLL VENOUS BLD VENIPUNCTURE: CPT

## 2024-01-13 PROCEDURE — 84702 CHORIONIC GONADOTROPIN TEST: CPT

## 2024-01-29 ENCOUNTER — OFFICE VISIT (OUTPATIENT)
Dept: OBSTETRICS AND GYNECOLOGY | Facility: CLINIC | Age: 24
End: 2024-01-29
Payer: COMMERCIAL

## 2024-01-29 VITALS
HEIGHT: 63 IN | DIASTOLIC BLOOD PRESSURE: 60 MMHG | SYSTOLIC BLOOD PRESSURE: 110 MMHG | BODY MASS INDEX: 41.46 KG/M2 | WEIGHT: 234 LBS

## 2024-01-29 DIAGNOSIS — O20.0 THREATENED ABORTION: Primary | ICD-10-CM

## 2024-01-29 PROCEDURE — 99459 PELVIC EXAMINATION: CPT | Performed by: NURSE PRACTITIONER

## 2024-01-29 PROCEDURE — 99213 OFFICE O/P EST LOW 20 MIN: CPT | Performed by: NURSE PRACTITIONER

## 2024-01-29 PROCEDURE — 1159F MED LIST DOCD IN RCRD: CPT | Performed by: NURSE PRACTITIONER

## 2024-01-29 PROCEDURE — 1160F RVW MEDS BY RX/DR IN RCRD: CPT | Performed by: NURSE PRACTITIONER

## 2024-01-29 NOTE — PROGRESS NOTES
"             Chief Complaint   Patient presents with    Threatened Miscarriage       Subjective   HPI  Carol Vega is a 23 y.o. female, , who presents for had positive pregnancy test on 2023. She started bleeding 24 that lasted for 3 days and was heavy with clots. During this time she went to the ER and was told to FU here. She had blood drawn and her HCG was low early this month (3.93) and her level had dropped even more the following week (<1).  She has not had an US. She is no longer bleeding.    She now c/o pelvic pain that feels like her \"uterus is going to fall out\" when she sits on the toilet.    Her last LMP was   Her periods occur every 30 days, lasting 5 days. . She reports dysmenorrhea is mild, occurring first 1-2 days of flow. Partner Status: Marital Status: single.   Desires STD Screening: no.    Additional OB/GYN History   Current contraception: contraceptive methods: None  Desires to: start contraception  Last Pap : 23 ASCUS, HPV +  Last Completed Pap Smear            PAP SMEAR (Every 3 Years) Next due on 2026  LIQUID-BASED PAP SMEAR WITH HPV GENOTYPING IF ASCUS ([facility])                  History of abnormal Pap smear: yes -    Last mammogram: n/a  Last Completed Mammogram       This patient has no relevant Health Maintenance data.          Tobacco Usage?: Yes Carol Vega  reports that she has been smoking electronic cigarette. She has never used smokeless tobacco.. I have educated her on the risk of diseases from using tobacco products such as cancer, COPD, heart disease, reproductive problems, low birth weight, and cataracts.     I advised her to quit and she is willing to quit. We have discussed the following method/s for tobacco cessation:  Counseling.  Together we have set a quit date for  unable to set at this time .  She will follow up with me in 1 month or sooner to check on her progress.    I spent 3  minutes counseling the " "patient.        OB History          2    Para   1    Term   1            AB        Living   1         SAB        IAB        Ectopic        Molar        Multiple   0    Live Births   1                Health Maintenance   Topic Date Due    Annual Gynecologic Pelvic and Breast Exam  Never done    COVID-19 Vaccine (1) Never done    Pneumococcal Vaccine 0-64 (1 of 2 - PCV) 2008    TDAP/TD VACCINES (2 - Td or Tdap) 2023    INFLUENZA VACCINE  2023    ANNUAL PHYSICAL  2023    BMI FOLLOWUP  10/24/2024    CHLAMYDIA SCREENING  2024    PAP SMEAR  2026    HEPATITIS C SCREENING  Completed    MENINGOCOCCAL VACCINE  Completed    HPV VACCINES  Completed       The additional following portions of the patient's history were reviewed and updated as appropriate: allergies, current medications, past family history, past medical history, past social history, past surgical history, and problem list.    Review of Systems   Constitutional: Negative.    HENT: Negative.     Eyes: Negative.    Respiratory: Negative.     Cardiovascular: Negative.    Gastrointestinal: Negative.    Endocrine: Negative.    Genitourinary:  Positive for pelvic pain.   Musculoskeletal: Negative.    Skin: Negative.    Allergic/Immunologic: Negative.    Neurological: Negative.    Hematological: Negative.    Psychiatric/Behavioral: Negative.         I have reviewed and agree with the HPI, ROS, and historical information as entered above. Carlos Jimenez, APRN      Objective   /60 (BP Location: Left arm, Patient Position: Sitting, Cuff Size: Adult)   Ht 160 cm (63\")   Wt 106 kg (234 lb)   LMP  (LMP Unknown)   Breastfeeding No   BMI 41.45 kg/m²     Physical Exam  Vitals and nursing note reviewed. Exam conducted with a chaperone present.   Constitutional:       Appearance: Normal appearance. She is obese.   Genitourinary:     General: Normal vulva.      Exam position: Lithotomy position.      Labia:         " Right: No rash, tenderness or lesion.         Left: No rash, tenderness or lesion.       Vagina: Normal. No lesions.      Cervix: Normal. No cervical motion tenderness, discharge, lesion or cervical bleeding.      Uterus: Normal. Not enlarged, not fixed and not tender.       Adnexa: Right adnexa normal and left adnexa normal.        Right: No mass or tenderness.          Left: No mass or tenderness.        Rectum: Normal. No external hemorrhoid.      Comments: Chaperone Present  Neurological:      Mental Status: She is alert.         Assessment & Plan     Assessment     Problem List Items Addressed This Visit    None  Visit Diagnoses       Threatened     -  Primary    Relevant Orders    HCG, B-subunit, Quantitative    US Ob Transvaginal              Plan     US today shows no evidence of IUP. We discussed the likelihood that she experienced a miscarriage earlier this month and, if so, it now appears complete. Will repeat Hcg again today. All questions answered.  Normal pelvic exam today. If feeling of pelvic fullness continues beyond 6 months PP, may consider pelvic floor PT.  Discussed contraception. Carol is planning to start ocp's with next menstrual cycle.       Carlos Jimenez, APRN  2024

## 2024-01-30 LAB — HCG INTACT+B SERPL-ACNC: <1 MIU/ML

## 2024-02-09 ENCOUNTER — LAB (OUTPATIENT)
Dept: LAB | Facility: HOSPITAL | Age: 24
End: 2024-02-09
Payer: COMMERCIAL

## 2024-02-09 ENCOUNTER — TELEPHONE (OUTPATIENT)
Dept: OBSTETRICS AND GYNECOLOGY | Facility: CLINIC | Age: 24
End: 2024-02-09
Payer: COMMERCIAL

## 2024-02-09 DIAGNOSIS — Z34.90 PREGNANCY, UNSPECIFIED GESTATIONAL AGE: Primary | ICD-10-CM

## 2024-02-09 LAB — HCG INTACT+B SERPL-ACNC: 445.7 MIU/ML

## 2024-02-09 PROCEDURE — 84702 CHORIONIC GONADOTROPIN TEST: CPT | Performed by: STUDENT IN AN ORGANIZED HEALTH CARE EDUCATION/TRAINING PROGRAM

## 2024-02-09 PROCEDURE — 36415 COLL VENOUS BLD VENIPUNCTURE: CPT | Performed by: STUDENT IN AN ORGANIZED HEALTH CARE EDUCATION/TRAINING PROGRAM

## 2024-02-09 NOTE — TELEPHONE ENCOUNTER
Caller: Carol Vega    Relationship to patient: Self    Best call back number: [phone] (home)  CAN CALL BACK     UNKNOWN LMP POS PREG TEST, PT HAD A BABY FOUR MONTHS AGO AND A CONFIRMED MISCARRIAGE LAST MONTH.  PT WOULD LIKE TO KNOW WHAT YOU RECOMMEND.        UNABLE TO WT CALL

## 2024-02-15 ENCOUNTER — LAB (OUTPATIENT)
Dept: LAB | Facility: HOSPITAL | Age: 24
End: 2024-02-15
Payer: COMMERCIAL

## 2024-02-15 DIAGNOSIS — Z34.90 PREGNANCY, UNSPECIFIED GESTATIONAL AGE: Primary | ICD-10-CM

## 2024-02-15 DIAGNOSIS — Z34.90 PREGNANCY, UNSPECIFIED GESTATIONAL AGE: ICD-10-CM

## 2024-02-15 LAB — HCG INTACT+B SERPL-ACNC: 5814 MIU/ML

## 2024-02-15 PROCEDURE — 84702 CHORIONIC GONADOTROPIN TEST: CPT

## 2024-03-14 ENCOUNTER — INITIAL PRENATAL (OUTPATIENT)
Dept: OBSTETRICS AND GYNECOLOGY | Facility: CLINIC | Age: 24
End: 2024-03-14
Payer: COMMERCIAL

## 2024-03-14 VITALS — BODY MASS INDEX: 41.63 KG/M2 | DIASTOLIC BLOOD PRESSURE: 70 MMHG | SYSTOLIC BLOOD PRESSURE: 110 MMHG | WEIGHT: 235 LBS

## 2024-03-14 DIAGNOSIS — Z98.891 HX OF CESAREAN SECTION: ICD-10-CM

## 2024-03-14 DIAGNOSIS — O09.891 SHORT INTERVAL BETWEEN PREGNANCIES AFFECTING PREGNANCY IN FIRST TRIMESTER, ANTEPARTUM: Primary | ICD-10-CM

## 2024-03-14 PROBLEM — O09.899 SHORT INTERVAL BETWEEN PREGNANCIES AFFECTING PREGNANCY, ANTEPARTUM: Status: ACTIVE | Noted: 2024-03-14

## 2024-03-14 NOTE — PROGRESS NOTES
Subjective     Chief Complaint   Patient presents with    Initial Prenatal Visit     New OB 9w 2d, MACI 10/15/2024       Carol Vega is a 23 y.o. .  No LMP recorded (lmp unknown). Patient is pregnant..  She presents to be seen to initiate prenatal care with our practice. Her first pregnancy was complicated by gallstones and she had her gallbladder removed about 2 weeks postpartum. She had a primary CSection for failed induction (induction due to gallstones). She wants to try  this time. She had a SAB 2024.     The following portions of the patient's history were reviewed and updated as appropriate:vital signs, allergies, current medications, past family history, past medical history, past social history, past surgical history, and problem list.    Review of Systems -   /70   Wt 107 kg (235 lb)   LMP  (LMP Unknown)   BMI 41.63 kg/m²   Gastrointestinal: Minimal nausea and vomiting, denies constipation   Genitourinary: denies frequency, urgency, or burning with urination  All other systems were reviewed and are negative    Objective     Physical Exam  Constitutional   The patient is awake, alert, well developed, well nourished and well groomed.   EENT  The neck is supple and the trachea is midline. Thyroid without nodules  Breast  Breast exam declined    Respiratory  The patient is relaxed and breathes without effort.   Lungs CTAB  Cardiovascular  Heart normal rate and rhythm is regular without murmur    Gastrointestinal   The abdomen is soft and non tender.  No hepatosplenomegaly.  Skin  Normal color. No rashes or lesions  Musculoskeletal  Full ROM. Normal gait  Neuro  Speech is fluent and words are clear. Normal, appropriate behavior   Psychiatric :  Oriented to person, place, and time. Thought processes are coherent, insight is good.     Imaging   Pelvic ultrasound report  9w2d, + FHT  US Ob Transvaginal (2024 14:20)     Assessment & Plan     ASSESSMENT  IUP at 9w2d   Closely spaced  pregnancies  Hx of CSection  4.   Discomforts of pregnancy.    PLAN  Tests ordered today:  Orders Placed This Encounter   Procedures    Chlamydia trachomatis, Neisseria gonorrhoeae, PCR w/ confirmation - , Urine, Clean Catch     Order Specific Question:   Release to patient     Answer:   Routine Release [[id number]]    OB Panel With HIV     Standing Status:   Future     Standing Expiration Date:   3/14/2025     Order Specific Question:   Release to patient     Answer:   Routine Release [[id number]]    Urine Drug Screen - Urine, Clean Catch     Order Specific Question:   Release to patient     Answer:   Routine Release [[id number]]    [id number] PLUS Core+SCA - Blood,     Standing Status:   Future     Standing Expiration Date:   3/14/2025     Order Specific Question:   [facility] Date of last menstrual period or estimated date of delivery (corresponding to calculation method):     Answer:   10/15/2024     Order Specific Question:   [facility] Gestational age calculation method:     Answer:   MACI,EDC     Order Specific Question:   Release to patient     Answer:   Routine Release [[id number]]     Medications prescribed today:  No orders of the defined types were placed in this encounter.    Information reviewed: exercise in pregnancy, nutrition in pregnancy, weight gain in pregnancy, work and travel restrictions during pregnancy, list of OTC medications acceptable in pregnancy, and call coverage groups  The problem list for pregnancy was initiated today      Follow up: 4 week(s)         This note was electronically signed.    Heather Joyner CNM  3/14/2024

## 2024-03-19 LAB
AMPHETAMINES UR QL SCN: NEGATIVE NG/ML
BARBITURATES UR QL SCN: NEGATIVE NG/ML
BENZODIAZ UR QL SCN: NEGATIVE NG/ML
BZE UR QL SCN: NEGATIVE NG/ML
C TRACH RRNA SPEC QL NAA+PROBE: NEGATIVE
CANNABINOIDS UR QL SCN: NEGATIVE NG/ML
CREAT UR-MCNC: 248.8 MG/DL (ref 20–300)
LABORATORY COMMENT REPORT: NORMAL
METHADONE UR QL SCN: NEGATIVE NG/ML
N GONORRHOEA RRNA SPEC QL NAA+PROBE: NEGATIVE
OPIATES UR QL SCN: NEGATIVE NG/ML
OXYCODONE+OXYMORPHONE UR QL SCN: NEGATIVE NG/ML
PCP UR QL: NEGATIVE NG/ML
PH UR: 5.8 [PH] (ref 4.5–8.9)
PROPOXYPH UR QL SCN: NEGATIVE NG/ML

## 2024-03-28 DIAGNOSIS — Z34.81 ENCOUNTER FOR SUPERVISION OF OTHER NORMAL PREGNANCY IN FIRST TRIMESTER: Primary | ICD-10-CM

## 2024-03-28 DIAGNOSIS — Z34.81 ENCOUNTER FOR SUPERVISION OF OTHER NORMAL PREGNANCY IN FIRST TRIMESTER: ICD-10-CM

## 2024-04-04 LAB
CFDNA.FET/CFDNA.TOTAL SFR FETUS: ABNORMAL %
CITATION REF LAB TEST: ABNORMAL
FET 13+18+21+X+Y ANEUP PLAS.CFDNA: ABNORMAL
GA EST FROM CONCEPTION DATE: ABNORMAL D
GESTATIONAL AGE > 9:: YES
LAB DIRECTOR NAME PROVIDER: ABNORMAL
LAB DIRECTOR NAME PROVIDER: ABNORMAL
LABORATORY COMMENT REPORT: ABNORMAL
LIMITATIONS OF THE TEST: ABNORMAL
NEGATIVE PREDICTIVE VALUE: ABNORMAL
NOTE: ABNORMAL
PERFORMANCE CHARACTERISTICS: ABNORMAL
REF LAB TEST METHOD: ABNORMAL
TEST PERFORMANCE INFO SPEC: ABNORMAL

## 2024-04-11 ENCOUNTER — ROUTINE PRENATAL (OUTPATIENT)
Dept: OBSTETRICS AND GYNECOLOGY | Facility: CLINIC | Age: 24
End: 2024-04-11
Payer: COMMERCIAL

## 2024-04-11 VITALS — SYSTOLIC BLOOD PRESSURE: 112 MMHG | BODY MASS INDEX: 42.69 KG/M2 | DIASTOLIC BLOOD PRESSURE: 74 MMHG | WEIGHT: 241 LBS

## 2024-04-11 DIAGNOSIS — O09.891 SHORT INTERVAL BETWEEN PREGNANCIES AFFECTING PREGNANCY IN FIRST TRIMESTER, ANTEPARTUM: ICD-10-CM

## 2024-04-11 DIAGNOSIS — F41.9 ANXIETY DURING PREGNANCY: ICD-10-CM

## 2024-04-11 DIAGNOSIS — O28.5 ABNORMAL CHROMOSOMAL AND GENETIC FINDING ON ANTENATAL SCREENING MOTHER: ICD-10-CM

## 2024-04-11 DIAGNOSIS — Z34.91 PRENATAL CARE IN FIRST TRIMESTER: Primary | ICD-10-CM

## 2024-04-11 DIAGNOSIS — O99.340 ANXIETY DURING PREGNANCY: ICD-10-CM

## 2024-04-11 DIAGNOSIS — Z98.891 PREVIOUS CESAREAN SECTION: ICD-10-CM

## 2024-04-11 PROCEDURE — 36415 COLL VENOUS BLD VENIPUNCTURE: CPT | Performed by: STUDENT IN AN ORGANIZED HEALTH CARE EDUCATION/TRAINING PROGRAM

## 2024-04-11 NOTE — PROGRESS NOTES
Prenatal Care Visit    Subjective   Chief Complaint   Patient presents with    Routine Prenatal Visit     Patient complains of feet swelling and panic attacks     History:   Carol is a  currently at 13w2d who presents for a prenatal care visit today.    Reports some nausea, no vomiting. Denies VB. Has had a little swelling in her feet. Has had some anxiety/ panic attacks but prefers not to take anxiety medication.     Objective   /74   Wt 109 kg (241 lb)   LMP  (LMP Unknown)   BMI 42.69 kg/m²   Physical Exam:  Normal, gestational age-appropriate exam today      Assessment & Plan     IUP @ 13w2d  Routine care: I have reviewed the prenatal labs and ultrasound(s) today. I have reviewed the most recent prenatal progress note(s).   Prior x 1: G1 for failure to progress. States she would like a TOLAC, however we discussed today that with such a short interval between pregnancies, she is at increased risk for uterine rupture and therefore TOLAC is not advised.  Anxiety, PTSD: not currently on medications, prefers not to start currently. Encouraged to let us know if symptoms are worsening.  Short interval pregnancy: last delivery via CS 2023.  Abnormal cfDNA x 2: low fetal fraction x 2. Patient's mom is a  and plans to redraw test and ensure that an adequate sample is sent (they state they are concerned that an inadequate amount of blood was sent previously). We discussed the association of low fetal fraction with abnormal karyotype and therefore offered referral to [facility] for discussion of repeat testing, NT sono, or diagnostic testing via [facility] or amnio. Carol would like to try cfDNA once more and if still abnormal, requests referral to [facility].     Diagnosis Plan   1. Prenatal care in first trimester        2. Previous  section        3. Anxiety during pregnancy        4. Short interval between pregnancies affecting pregnancy in first trimester, antepartum        5. Abnormal chromosomal and  genetic finding on  screening mother  [id number] PLUS Core+SCA - Blood,        Medication Management: continue PNV    Topics discussed: Prenatal care milestones  Genetic screening   TOLAC risks, benefits   Tests next visit: U/S for anatomic screening   Next visit: 5 week(s)     Tenisha Lane MD  Obstetrics and Gynecology  [facility]

## 2024-04-17 ENCOUNTER — TELEPHONE (OUTPATIENT)
Dept: OBSTETRICS AND GYNECOLOGY | Facility: CLINIC | Age: 24
End: 2024-04-17
Payer: COMMERCIAL

## 2024-04-18 LAB — SPECIMEN STATUS: NORMAL

## 2024-04-18 NOTE — TELEPHONE ENCOUNTER
Im pretty sure but she would not go in details with me when requesting a phone call from you.       Thanks    Melina

## 2024-04-25 ENCOUNTER — TELEPHONE (OUTPATIENT)
Dept: OBSTETRICS AND GYNECOLOGY | Facility: CLINIC | Age: 24
End: 2024-04-25
Payer: COMMERCIAL

## 2024-04-25 DIAGNOSIS — O28.5 ABNORMAL CHROMOSOMAL AND GENETIC FINDING ON ANTENATAL SCREENING MOTHER: Primary | ICD-10-CM

## 2024-04-25 NOTE — TELEPHONE ENCOUNTER
Called patient to review that the third cfDNA test was cancelled again due to low fetal fraction. At this time I recommend consultation with MFM to discuss other genetic screening/ diagnostic testing options. Referral placed and patient will be expecting call to schedule.    Tenisha Lane MD   Obstetrics and Gynecology  [facility]

## 2024-05-14 ENCOUNTER — LAB (OUTPATIENT)
Dept: LAB | Facility: HOSPITAL | Age: 24
End: 2024-05-14
Payer: COMMERCIAL

## 2024-05-14 ENCOUNTER — HOSPITAL ENCOUNTER (OUTPATIENT)
Dept: WOMENS IMAGING | Facility: HOSPITAL | Age: 24
Discharge: HOME | End: 2024-05-14
Payer: COMMERCIAL

## 2024-05-14 ENCOUNTER — OFFICE VISIT (OUTPATIENT)
Dept: OBSTETRICS AND GYNECOLOGY | Facility: HOSPITAL | Age: 24
End: 2024-05-14
Payer: COMMERCIAL

## 2024-05-14 VITALS
BODY MASS INDEX: 42.66 KG/M2 | SYSTOLIC BLOOD PRESSURE: 120 MMHG | HEIGHT: 63 IN | WEIGHT: 240.8 LBS | DIASTOLIC BLOOD PRESSURE: 71 MMHG

## 2024-05-14 DIAGNOSIS — O28.5 ABNORMAL CHROMOSOMAL AND GENETIC FINDING ON ANTENATAL SCREENING MOTHER: ICD-10-CM

## 2024-05-14 DIAGNOSIS — O28.5 ABNORMAL GENETIC TEST DURING PREGNANCY: Primary | ICD-10-CM

## 2024-05-14 PROCEDURE — 36415 COLL VENOUS BLD VENIPUNCTURE: CPT | Performed by: OBSTETRICS & GYNECOLOGY

## 2024-05-14 PROCEDURE — 76811 OB US DETAILED SNGL FETUS: CPT

## 2024-05-14 PROCEDURE — 99212 OFFICE O/P EST SF 10 MIN: CPT | Performed by: OBSTETRICS & GYNECOLOGY

## 2024-05-14 NOTE — PROGRESS NOTES
"    Maternal/Fetal Medicine Consult Note   Date: 2024  Name: Carol Vega    : 2000     MRN: [id number]     Referring Provider: Tenisha Lane MD    Chief Complaint  NIPT Low ff x3, Prev c/s, short interval    Subjective     History of Present Illness:  Carol Vega is a 23 y.o.  18w0d who presents today for low fetal fraction on NIPT    MACI: Estimated Date of Delivery: 10/15/24     ROS:   Otherwise Noted in HPI    Past Medical History:   Diagnosis Date    Anxiety     Endometriosis     Hx of Chlamydia     with last Pregnancy    Hx of Gonorrhea     age 18    Hx of Kidney stone     Hx of Seizures     trauma induced years ago/ last one was 2-3 years ago - no medications    Major depression     Migraine     PMS (premenstrual syndrome)     PTSD (post-traumatic stress disorder)     Self-injurious behavior     hx of cutting starting at age 15    Suicide attempt     3/25/19-overdose attempt, 2017 overdose attempt    Urinary tract infection       Past Surgical History:   Procedure Laterality Date     SECTION N/A 2023    Procedure:  SECTION PRIMARY;  Surgeon: Ludwig Hernandez MD;  Location: [facility] OR;  Service: Obstetrics/Gynecology;  Laterality: N/A;    CHOLECYSTECTOMY N/A 10/16/2023    Procedure: CHOLECYSTECTOMY LAPAROSCOPIC;  Surgeon: Noemy Tsang MD;  Location: [facility] OR;  Service: General;  Laterality: N/A;    ESOPHAGEAL DILATATION      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    TONSILLECTOMY        OB History          4    Para   1    Term   1            AB   2    Living   1         SAB   2    IAB        Ectopic        Molar        Multiple   0    Live Births   1          Obstetric Comments   Fob #1 - Pregnancy #1 - #4                Current Outpatient Medications:     Prenatal Vit-Fe Fumarate-FA (PRENATAL VITAMINS PO), Take  by mouth., Disp: , Rfl:     Objective     Vital Signs  /71   Ht 158.8 cm (62.5\")   Wt 109 kg (240 lb 12.8 oz)   LMP " " (LMP Unknown)   Estimated body mass index is 43.34 kg/m² as calculated from the following:    Height as of this encounter: 158.8 cm (62.5\").    Weight as of this encounter: 109 kg (240 lb 12.8 oz).    Ultrasound Impression:   See Viewpoint     Assessment and Plan     Carol Vega is a 23 y.o.  18w0d who presents today for low fetal fraction on NIPT    Diagnoses and all orders for this visit:    1. Abnormal genetic test during pregnancy (Primary)  Assessment & Plan:  Patient with low fetal fraction on NIPT 3 times during this pregnancy.  First around 10 weeks, second around 11 weeks and third at 13 weeks.     Prenatal screening for trisomy 21, trisomy 18, trisomy 13 and sex chromosome aneuploidies can be performed using next-generation sequencing of cell-free DNA (cfDNA) in the maternal circulation.  Circulating cfDNA is derived from both the mother and the fetal placenta unit and cleared from the maternal circulation soon after delivery.  A wide range of cfDNA failure rates have been reported (0 to 10% or more, with a consensus estimate of about 2.0%) and it is difficult to explain the variability.  Although there is no standard approach to this situation, it is common practice to retest once using a cfDNA test or to switch to a standard serum screen (e.g. combined or quadruple screen). Repeat sampling is not recommended, however, in the situation of an uninformative DNA pattern.  A targeted ultrasound is another option, with invasive diagnostic testing as an option if the pregnancy was already considered to be at “high risk.”      The most common reasons for test failure include less than a specified absolute amount of total and/or fetal/placental DNA, fetal fraction below an acceptable level (e.g. <4%), and insufficient numbers of fragments sequenced and/or aligned.  Low fetal fraction may be responsible for up to 50% of all failures.   Test failure can also occur with long stretches of homozygosity.  " Examples include uniparental disomy or parental consanguinity.      The concentration of fetal cfDNA falls with increasing maternal weight and is more often insufficient for prenatal screening in obese women.  Fetal cfDNA is still produced, but the fetal fraction in maternal blood is reduced because of an increased contribution of maternal cfDNA from apoptosis of adipose tissue and/or dilution of fetal (placental) DNA in the larger maternal blood volume.  Women weighing over 180 pounds (81 kg) can be informed that their chance of having a test failure or an inaccurate result is at least three or four times higher than in women of lower weight.      The patient has four options in this setting:      In the absence of an uninformative DNA pattern, repeat the cfDNA test as soon as possible, if allowed by the laboratory (some failures, such as large regions of homozygosity will always cause the test to fail and repeat testing is not an option).  Repeat testing, when allowed, is successful in about 80% of cases.  Would not recommend at this point given tested 3 times.    Standard serum marker/ultrasound screening.    Detailed ultrasonographic evaluation at 19-20 weeks' gestation for ultrasound evidence of fetal aneuploidy.  Today's ultrasound shows no obvious aneuploidy though limited cardiac evaluation today.    Invasive procedure (amniocentesis, CVS) and diagnostic testing (karyotyping/microarray).      After discussion today patient would like to pursue quad screen at this, time which was ordered.  Patient declines amniocentesis today though may pursue this option if anomalies are seen on next ultrasound or if quad is abnormal.      Orders:  -     Maternal Screen 4         Follow Up  No follow-ups on file.    I spent 15 minutes caring for the patient on the day of service. This included: obtaining or reviewing a separately obtained medical history, reviewing patient records, performing a medically appropriate exam  and/or evaluation, counseling or educating the patient/family/caregiver, ordering medications, labs, and/or procedures and documenting such in the medical record. This does not include time spent on review and interpretation of other tests such as fetal ultrasound or the performance of other procedures such as amniocentesis or CVS.      Bill Ruano MD, FACOG  Maternal Fetal Medicine, [facility] [place]

## 2024-05-14 NOTE — LETTER
May 14, 2024     Tenisha Lane MD  [address]  [address]  [facility] [place] [address]    Patient: Carol Vega   YOB: 2000   Date of Visit: 2024       Dear Tenisha Lane MD,    Thank you for referring Carol Vega to me for evaluation. Below is a copy of my consult note.    If you have questions, please do not hesitate to call me. I look forward to following Carol along with you.         Sincerely,        Bill Ruano MD        CC: No Recipients    Pt reports her mother has Lupus and RA  Reports FOB's sister and niece born with 1/2 a brain  Reports she was born with a small esophogus and requires dilation every couple of years   NIPT low ff x3  Next f/u with Ariana on  [facility]           Maternal/Fetal Medicine Consult Note   Date: 2024  Name: Carol Vega    : 2000     MRN: [id number]     Referring Provider: Tenisha Lane MD    Chief Complaint  NIPT Low ff x3, Prev c/s, short interval    Subjective     History of Present Illness:  Carol Vega is a 23 y.o.  18w0d who presents today for low fetal fraction on NIPT    MACI: Estimated Date of Delivery: 10/15/24     ROS:   Otherwise Noted in HPI    Past Medical History:   Diagnosis Date   • Anxiety    • Endometriosis    • Hx of Chlamydia     with last Pregnancy   • Hx of Gonorrhea     age 18   • Hx of Kidney stone    • Hx of Seizures     trauma induced years ago/ last one was 2-3 years ago - no medications   • Major depression    • Migraine    • PMS (premenstrual syndrome)    • PTSD (post-traumatic stress disorder)    • Self-injurious behavior     hx of cutting starting at age 15   • Suicide attempt     3/25/19-overdose attempt, 2017 overdose attempt   • Urinary tract infection       Past Surgical History:   Procedure Laterality Date   •  SECTION N/A 2023    Procedure:  SECTION PRIMARY;  Surgeon: Ludwig Hernandez MD;  Location: [facility];  Service: Obstetrics/Gynecology;   "Laterality: N/A;   • CHOLECYSTECTOMY N/A 10/16/2023    Procedure: CHOLECYSTECTOMY LAPAROSCOPIC;  Surgeon: Noemy Tsang MD;  Location: [facility];  Service: General;  Laterality: N/A;   • ESOPHAGEAL DILATATION      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   • TONSILLECTOMY        OB History          4    Para   1    Term   1            AB   2    Living   1         SAB   2    IAB        Ectopic        Molar        Multiple   0    Live Births   1          Obstetric Comments   Fob #1 - Pregnancy #1 - #4                Current Outpatient Medications:   •  Prenatal Vit-Fe Fumarate-FA (PRENATAL VITAMINS PO), Take  by mouth., Disp: , Rfl:     Objective     Vital Signs  /71   Ht 158.8 cm (62.5\")   Wt 109 kg (240 lb 12.8 oz)   LMP  (LMP Unknown)   Estimated body mass index is 43.34 kg/m² as calculated from the following:    Height as of this encounter: 158.8 cm (62.5\").    Weight as of this encounter: 109 kg (240 lb 12.8 oz).    Ultrasound Impression:   See Viewpoint     Assessment and Plan     Carol Vega is a 23 y.o.  18w0d who presents today for low fetal fraction on NIPT    Diagnoses and all orders for this visit:    1. Abnormal genetic test during pregnancy (Primary)  Assessment & Plan:  Patient with low fetal fraction on NIPT 3 times during this pregnancy.  First around 10 weeks, second around 11 weeks and third at 13 weeks.     Prenatal screening for trisomy 21, trisomy 18, trisomy 13 and sex chromosome aneuploidies can be performed using next-generation sequencing of cell-free DNA (cfDNA) in the maternal circulation.  Circulating cfDNA is derived from both the mother and the fetal placenta unit and cleared from the maternal circulation soon after delivery.  A wide range of cfDNA failure rates have been reported (0 to 10% or more, with a consensus estimate of about 2.0%) and it is difficult to explain the variability.  Although there is no standard approach to this situation, it " is common practice to retest once using a cfDNA test or to switch to a standard serum screen (e.g. combined or quadruple screen). Repeat sampling is not recommended, however, in the situation of an uninformative DNA pattern.  A targeted ultrasound is another option, with invasive diagnostic testing as an option if the pregnancy was already considered to be at “high risk.”      The most common reasons for test failure include less than a specified absolute amount of total and/or fetal/placental DNA, fetal fraction below an acceptable level (e.g. <4%), and insufficient numbers of fragments sequenced and/or aligned.  Low fetal fraction may be responsible for up to 50% of all failures.   Test failure can also occur with long stretches of homozygosity.  Examples include uniparental disomy or parental consanguinity.      The concentration of fetal cfDNA falls with increasing maternal weight and is more often insufficient for prenatal screening in obese women.  Fetal cfDNA is still produced, but the fetal fraction in maternal blood is reduced because of an increased contribution of maternal cfDNA from apoptosis of adipose tissue and/or dilution of fetal (placental) DNA in the larger maternal blood volume.  Women weighing over 180 pounds (81 kg) can be informed that their chance of having a test failure or an inaccurate result is at least three or four times higher than in women of lower weight.      The patient has four options in this setting:      In the absence of an uninformative DNA pattern, repeat the cfDNA test as soon as possible, if allowed by the laboratory (some failures, such as large regions of homozygosity will always cause the test to fail and repeat testing is not an option).  Repeat testing, when allowed, is successful in about 80% of cases.  Would not recommend at this point given tested 3 times.    Standard serum marker/ultrasound screening.    Detailed ultrasonographic evaluation at 19-20 weeks'  gestation for ultrasound evidence of fetal aneuploidy.  Today's ultrasound shows no obvious aneuploidy though limited cardiac evaluation today.    Invasive procedure (amniocentesis, CVS) and diagnostic testing (karyotyping/microarray).      After discussion today patient would like to pursue quad screen at this, time which was ordered.  Patient declines amniocentesis today though may pursue this option if anomalies are seen on next ultrasound or if quad is abnormal.      Orders:  -     Maternal Screen 4         Follow Up  No follow-ups on file.    I spent 15 minutes caring for the patient on the day of service. This included: obtaining or reviewing a separately obtained medical history, reviewing patient records, performing a medically appropriate exam and/or evaluation, counseling or educating the patient/family/caregiver, ordering medications, labs, and/or procedures and documenting such in the medical record. This does not include time spent on review and interpretation of other tests such as fetal ultrasound or the performance of other procedures such as amniocentesis or CVS.      Bill Ruano MD, FACOG  Maternal Fetal Medicine, [facility] [place]

## 2024-05-14 NOTE — ASSESSMENT & PLAN NOTE
Patient with low fetal fraction on NIPT 3 times during this pregnancy.  First around 10 weeks, second around 11 weeks and third at 13 weeks.     Prenatal screening for trisomy 21, trisomy 18, trisomy 13 and sex chromosome aneuploidies can be performed using next-generation sequencing of cell-free DNA (cfDNA) in the maternal circulation.  Circulating cfDNA is derived from both the mother and the fetal placenta unit and cleared from the maternal circulation soon after delivery.  A wide range of cfDNA failure rates have been reported (0 to 10% or more, with a consensus estimate of about 2.0%) and it is difficult to explain the variability.  Although there is no standard approach to this situation, it is common practice to retest once using a cfDNA test or to switch to a standard serum screen (e.g. combined or quadruple screen). Repeat sampling is not recommended, however, in the situation of an uninformative DNA pattern.  A targeted ultrasound is another option, with invasive diagnostic testing as an option if the pregnancy was already considered to be at “high risk.”      The most common reasons for test failure include less than a specified absolute amount of total and/or fetal/placental DNA, fetal fraction below an acceptable level (e.g. <4%), and insufficient numbers of fragments sequenced and/or aligned.  Low fetal fraction may be responsible for up to 50% of all failures.   Test failure can also occur with long stretches of homozygosity.  Examples include uniparental disomy or parental consanguinity.      The concentration of fetal cfDNA falls with increasing maternal weight and is more often insufficient for prenatal screening in obese women.  Fetal cfDNA is still produced, but the fetal fraction in maternal blood is reduced because of an increased contribution of maternal cfDNA from apoptosis of adipose tissue and/or dilution of fetal (placental) DNA in the larger maternal blood volume.  Women weighing over 180  pounds (81 kg) can be informed that their chance of having a test failure or an inaccurate result is at least three or four times higher than in women of lower weight.      The patient has four options in this setting:      In the absence of an uninformative DNA pattern, repeat the cfDNA test as soon as possible, if allowed by the laboratory (some failures, such as large regions of homozygosity will always cause the test to fail and repeat testing is not an option).  Repeat testing, when allowed, is successful in about 80% of cases.  Would not recommend at this point given tested 3 times.    Standard serum marker/ultrasound screening.    Detailed ultrasonographic evaluation at 19-20 weeks' gestation for ultrasound evidence of fetal aneuploidy.  Today's ultrasound shows no obvious aneuploidy though limited cardiac evaluation today.    Invasive procedure (amniocentesis, CVS) and diagnostic testing (karyotyping/microarray).      After discussion today patient would like to pursue quad screen at this, time which was ordered.  Patient declines amniocentesis today though may pursue this option if anomalies are seen on next ultrasound or if quad is abnormal.

## 2024-05-14 NOTE — PROGRESS NOTES
Pt reports her mother has Lupus and RA  Reports FOB's sister and niece born with 1/2 a brain  Reports she was born with a small esophogus and requires dilation every couple of years   NIPT low ff x3  Next f/u with Ariana on 5/16 [facility] [place]

## 2024-05-16 ENCOUNTER — ROUTINE PRENATAL (OUTPATIENT)
Dept: OBSTETRICS AND GYNECOLOGY | Facility: CLINIC | Age: 24
End: 2024-05-16
Payer: COMMERCIAL

## 2024-05-16 VITALS — BODY MASS INDEX: 43.56 KG/M2 | WEIGHT: 242 LBS | SYSTOLIC BLOOD PRESSURE: 102 MMHG | DIASTOLIC BLOOD PRESSURE: 68 MMHG

## 2024-05-16 DIAGNOSIS — Z34.92 SECOND TRIMESTER PREGNANCY: Primary | ICD-10-CM

## 2024-05-16 NOTE — PROGRESS NOTES
Chief Complaint   Patient presents with    Routine Prenatal Visit     Prenatal visit with no problems or concerns        HPI:   , 18w2d gestation reports doing well    ROS:  See Prenatal Episode/Flowsheet  /68   Wt 110 kg (242 lb)   LMP  (LMP Unknown)   BMI 43.56 kg/m²      EXAM:  EXTREMITIES:  No swelling-See Prenatal Episode/Flowsheet    ABDOMEN:  FHTs/Movement noted-See Prenatal Episode/Flowsheet    URINE GLUCOSE/PROTEIN:  See Prenatal Episode/Flowsheet    PELVIC EXAM:  See Prenatal Episode/Flowsheet  CV:  Lungs:  GYN:    MDM:    Lab Results   Component Value Date    HGB 10.7 (L) 2024    RUBELLAABIGG 1.60 2024    HEPBSAG Negative 2024    ABO O 2024    RH Positive 2024    ABSCRN Negative 2024    BCW9DNS7 Non Reactive 2024    HEPCVIRUSABY Non Reactive 2024    STREPGPB Negative 2023    URINECX Final report 2023       U/S:[facility] (2024 12:06)     1. IUP 18w2d  2. Routine care   3. MAternal screen done  4. Prior C/S  5. PDC views normal suboptimal cardiac views

## 2024-05-17 ENCOUNTER — TELEPHONE (OUTPATIENT)
Dept: OBSTETRICS AND GYNECOLOGY | Facility: HOSPITAL | Age: 24
End: 2024-05-17
Payer: COMMERCIAL

## 2024-05-22 ENCOUNTER — HOSPITAL ENCOUNTER (EMERGENCY)
Facility: HOSPITAL | Age: 24
Discharge: HOME | End: 2024-05-22
Attending: STUDENT IN AN ORGANIZED HEALTH CARE EDUCATION/TRAINING PROGRAM
Payer: COMMERCIAL

## 2024-05-22 VITALS
TEMPERATURE: 98.3 F | OXYGEN SATURATION: 97 % | SYSTOLIC BLOOD PRESSURE: 126 MMHG | DIASTOLIC BLOOD PRESSURE: 68 MMHG | HEIGHT: 63 IN | HEART RATE: 93 BPM | RESPIRATION RATE: 14 BRPM | WEIGHT: 242.51 LBS | BODY MASS INDEX: 42.97 KG/M2

## 2024-05-22 DIAGNOSIS — S49.91XA INJURY OF RIGHT SHOULDER, INITIAL ENCOUNTER: Primary | ICD-10-CM

## 2024-05-22 DIAGNOSIS — T74.91XA DOMESTIC VIOLENCE OF ADULT, INITIAL ENCOUNTER: ICD-10-CM

## 2024-05-22 PROCEDURE — 99282 EMERGENCY DEPT VISIT SF MDM: CPT

## 2024-05-22 RX ORDER — IBUPROFEN 800 MG/1
800 TABLET ORAL EVERY 8 HOURS PRN
Qty: 15 TABLET | Refills: 0 | Status: SHIPPED | OUTPATIENT
Start: 2024-05-22
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
## 2024-06-11 ENCOUNTER — HOSPITAL ENCOUNTER (OUTPATIENT)
Dept: WOMENS IMAGING | Facility: HOSPITAL | Age: 24
Discharge: HOME | End: 2024-06-11
Admitting: OBSTETRICS & GYNECOLOGY
Payer: COMMERCIAL

## 2024-06-11 ENCOUNTER — OFFICE VISIT (OUTPATIENT)
Dept: OBSTETRICS AND GYNECOLOGY | Facility: HOSPITAL | Age: 24
End: 2024-06-11
Payer: COMMERCIAL

## 2024-06-11 VITALS — SYSTOLIC BLOOD PRESSURE: 112 MMHG | BODY MASS INDEX: 44.64 KG/M2 | DIASTOLIC BLOOD PRESSURE: 60 MMHG | WEIGHT: 248.2 LBS

## 2024-06-11 DIAGNOSIS — E66.01 MORBID OBESITY WITH BMI OF 40.0-44.9, ADULT: ICD-10-CM

## 2024-06-11 DIAGNOSIS — Z34.90 PREGNANCY, UNSPECIFIED GESTATIONAL AGE: ICD-10-CM

## 2024-06-11 DIAGNOSIS — O28.5 ABNORMAL GENETIC TEST DURING PREGNANCY: ICD-10-CM

## 2024-06-11 DIAGNOSIS — Z98.891 HX OF CESAREAN SECTION: ICD-10-CM

## 2024-06-11 DIAGNOSIS — O09.899 SHORT INTERVAL BETWEEN PREGNANCIES AFFECTING PREGNANCY, ANTEPARTUM: ICD-10-CM

## 2024-06-11 DIAGNOSIS — O28.5 ABNORMAL GENETIC TEST DURING PREGNANCY: Primary | ICD-10-CM

## 2024-06-11 PROCEDURE — 76816 OB US FOLLOW-UP PER FETUS: CPT | Performed by: OBSTETRICS & GYNECOLOGY

## 2024-06-11 PROCEDURE — 76816 OB US FOLLOW-UP PER FETUS: CPT

## 2024-06-11 PROCEDURE — 99213 OFFICE O/P EST LOW 20 MIN: CPT | Performed by: OBSTETRICS & GYNECOLOGY

## 2024-06-11 RX ORDER — FERROUS SULFATE 325(65) MG
325 TABLET ORAL 2 TIMES DAILY
COMMUNITY
End: 2024-06-14 | Stop reason: SDUPTHER

## 2024-06-11 NOTE — ASSESSMENT & PLAN NOTE
Patient returns today for follow-up for noninformative NIPT x 3.  Patient reports no complications since her last scan.  Her last scan at [facility] diagnostic center was a detailed scan but was incomplete due to maternal habitus and fetal position.    Ultrasound today demonstrates a fetus that is normally grown.  There were no fetal abnormalities seen.  In particular no fetal markers for trisomy.  Amniotic fluid volume and cervical length appeared normal.    Patient's quad screen returned as low risk since her last visit as well.  With a normal ultrasound and a low risk quad screen and a 23-year-old patient the risk for chromosomal problems is very low.  We again discussed amniocentesis.  The procedure was explained and the risks including risk of pregnancy loss were outlined.  After careful consideration patient declines amniocentesis.    We will rescan the patient again at 32 weeks gestation to look for late findings of trisomy and assess fetal growth.

## 2024-06-11 NOTE — LETTER
"2024     Tenisha Lane MD  [address]  [address]  [facility] [address]    Patient: Carol Vega   YOB: 2000   Date of Visit: 2024     Dear Tenisha Lane MD:       Thank you for referring Carol Vega to me for evaluation. Below are the relevant portions of my assessment and plan of care.    If you have questions, please do not hesitate to call me. I look forward to following Carol along with you.         Sincerely,        Douglas A. Milligan, MD        CC: No Recipients    Milligan, Douglas A, MD  [phone]  Signed  Documentation of the ultrasound findings, images, and interpretations will be available in the patient's Viewpoint report which is located in the imaging tab in chart review.    Maternal/Fetal Medicine Follow Up  Note     Name: Carol Vega    : 2000     MRN: [id number]     Referring Provider: Tenisha Lane MD    Chief Complaint  No chief complaint on file.    Subjective     History of Present Illness:  Carol Vega is a 23 y.o.  22w0d who presents today for abn NIPT    MACI: Estimated Date of Delivery: 10/15/24     ROS:   As noted in HPI.     Objective     Vital Signs  /60   Wt 113 kg (248 lb 3.2 oz)   LMP  (LMP Unknown)   Estimated body mass index is 44.64 kg/m² as calculated from the following:    Height as of 24: 158.8 cm (62.52\").    Weight as of this encounter: 113 kg (248 lb 3.2 oz).    Physical Exam    Ultrasound Impression:   See Viewpoint    Assessment and Plan     Carol Vega is a 23 y.o.  22w0d who presents today for abn NIPT    Diagnoses and all orders for this visit:    1. Abnormal genetic test during pregnancy (Primary)  Assessment & Plan:  Patient returns today for follow-up for noninformative NIPT x 3.  Patient reports no complications since her last scan.  Her last scan at  diagnostic center was a detailed scan but was incomplete due to maternal habitus and fetal position.    Ultrasound today " demonstrates a fetus that is normally grown.  There were no fetal abnormalities seen.  In particular no fetal markers for trisomy.  Amniotic fluid volume and cervical length appeared normal.    Patient's quad screen returned as low risk since her last visit as well.  With a normal ultrasound and a low risk quad screen and a 23-year-old patient the risk for chromosomal problems is very low.  We again discussed amniocentesis.  The procedure was explained and the risks including risk of pregnancy loss were outlined.  After careful consideration patient declines amniocentesis.    We will rescan the patient again at 32 weeks gestation to look for late findings of trisomy and assess fetal growth.    Orders:  -     [facility]; Future    2. Short interval between pregnancies affecting pregnancy, antepartum  -     [facility]; Future    3. Hx of  section  -     [facility]; Future    4. Pregnancy, unspecified gestational age  -     [facility]; Future    5. Morbid obesity with BMI of 40.0-44.9, adult  -     [facility]; Future         Follow Up  Return in about 10 weeks (around 2024).    I spent 15 minutes caring for the patient on the day of service. This included: obtaining or reviewing a separately obtained medical history, reviewing patient records, performing a medically appropriate exam and/or evaluation, counseling or educating the patient/family/caregiver, ordering medications, labs, and/or procedures and documenting such in the medical record. This does not include time spent on review and interpretation of other tests such as fetal ultrasound or the performance of other procedures such as amniocentesis or CVS.      Douglas A. Milligan, MD  Maternal Fetal Medicine, [facility]    [facility] [place]     2024

## 2024-06-11 NOTE — PROGRESS NOTES
"Documentation of the ultrasound findings, images, and interpretations will be available in the patient's Viewpoint report which is located in the imaging tab in chart review.    Maternal/Fetal Medicine Follow Up  Note     Name: Carol Vega    : 2000     MRN: [id number]     Referring Provider: Tenisha Lane MD    Chief Complaint  No chief complaint on file.    Subjective     History of Present Illness:  Carol Vega is a 23 y.o.  22w0d who presents today for abn NIPT    MACI: Estimated Date of Delivery: 10/15/24     ROS:   As noted in HPI.     Objective     Vital Signs  /60   Wt 113 kg (248 lb 3.2 oz)   LMP  (LMP Unknown)   Estimated body mass index is 44.64 kg/m² as calculated from the following:    Height as of 24: 158.8 cm (62.52\").    Weight as of this encounter: 113 kg (248 lb 3.2 oz).    Physical Exam    Ultrasound Impression:   See Viewpoint    Assessment and Plan     Carol Vega is a 23 y.o.  22w0d who presents today for abn NIPT    Diagnoses and all orders for this visit:    1. Abnormal genetic test during pregnancy (Primary)  Assessment & Plan:  Patient returns today for follow-up for noninformative NIPT x 3.  Patient reports no complications since her last scan.  Her last scan at  diagnostic center was a detailed scan but was incomplete due to maternal habitus and fetal position.    Ultrasound today demonstrates a fetus that is normally grown.  There were no fetal abnormalities seen.  In particular no fetal markers for trisomy.  Amniotic fluid volume and cervical length appeared normal.    Patient's quad screen returned as low risk since her last visit as well.  With a normal ultrasound and a low risk quad screen and a 23-year-old patient the risk for chromosomal problems is very low.  We again discussed amniocentesis.  The procedure was explained and the risks including risk of pregnancy loss were outlined.  After careful consideration patient declines " amniocentesis.    We will rescan the patient again at 32 weeks gestation to look for late findings of trisomy and assess fetal growth.    Orders:  -     [facility]; Future    2. Short interval between pregnancies affecting pregnancy, antepartum  -     [facility]; Future    3. Hx of  section  -     [facility]; Future    4. Pregnancy, unspecified gestational age  -     [facility]; Future    5. Morbid obesity with BMI of 40.0-44.9, adult  -     [facility]; Future         Follow Up  Return in about 10 weeks (around 2024).    I spent 15 minutes caring for the patient on the day of service. This included: obtaining or reviewing a separately obtained medical history, reviewing patient records, performing a medically appropriate exam and/or evaluation, counseling or educating the patient/family/caregiver, ordering medications, labs, and/or procedures and documenting such in the medical record. This does not include time spent on review and interpretation of other tests such as fetal ultrasound or the performance of other procedures such as amniocentesis or CVS.      Douglas A. Milligan, MD  Maternal Fetal Medicine, [facility]    [facility] [place]     2024

## 2024-06-14 ENCOUNTER — ROUTINE PRENATAL (OUTPATIENT)
Dept: OBSTETRICS AND GYNECOLOGY | Facility: CLINIC | Age: 24
End: 2024-06-14
Payer: COMMERCIAL

## 2024-06-14 VITALS — SYSTOLIC BLOOD PRESSURE: 124 MMHG | DIASTOLIC BLOOD PRESSURE: 82 MMHG | WEIGHT: 249 LBS | BODY MASS INDEX: 44.79 KG/M2

## 2024-06-14 DIAGNOSIS — O09.899 SHORT INTERVAL BETWEEN PREGNANCIES AFFECTING PREGNANCY, ANTEPARTUM: ICD-10-CM

## 2024-06-14 DIAGNOSIS — F41.9 ANXIETY DURING PREGNANCY: ICD-10-CM

## 2024-06-14 DIAGNOSIS — Z98.891 PREVIOUS CESAREAN SECTION: ICD-10-CM

## 2024-06-14 DIAGNOSIS — O99.340 ANXIETY DURING PREGNANCY: ICD-10-CM

## 2024-06-14 DIAGNOSIS — Z34.92 PRENATAL CARE IN SECOND TRIMESTER: Primary | ICD-10-CM

## 2024-06-14 DIAGNOSIS — O99.019 MATERNAL ANEMIA IN PREGNANCY, ANTEPARTUM: ICD-10-CM

## 2024-06-14 DIAGNOSIS — O28.5 ABNORMAL GENETIC TEST DURING PREGNANCY: ICD-10-CM

## 2024-06-14 RX ORDER — FERROUS SULFATE 325(65) MG
325 TABLET ORAL 2 TIMES DAILY
Qty: 60 TABLET | Refills: 4 | Status: SHIPPED | OUTPATIENT
Start: 2024-06-14

## 2024-06-20 ENCOUNTER — HOSPITAL ENCOUNTER (EMERGENCY)
Facility: HOSPITAL | Age: 24
Discharge: HOME | End: 2024-06-20
Attending: STUDENT IN AN ORGANIZED HEALTH CARE EDUCATION/TRAINING PROGRAM
Payer: COMMERCIAL

## 2024-06-20 ENCOUNTER — TELEPHONE (OUTPATIENT)
Dept: OBSTETRICS AND GYNECOLOGY | Facility: CLINIC | Age: 24
End: 2024-06-20
Payer: COMMERCIAL

## 2024-06-20 VITALS
WEIGHT: 249.12 LBS | SYSTOLIC BLOOD PRESSURE: 135 MMHG | TEMPERATURE: 98.1 F | OXYGEN SATURATION: 97 % | HEIGHT: 63 IN | BODY MASS INDEX: 44.14 KG/M2 | DIASTOLIC BLOOD PRESSURE: 68 MMHG | HEART RATE: 76 BPM | RESPIRATION RATE: 12 BRPM

## 2024-06-20 DIAGNOSIS — S09.8XXA BLUNT HEAD TRAUMA, INITIAL ENCOUNTER: ICD-10-CM

## 2024-06-20 DIAGNOSIS — T74.91XA DOMESTIC VIOLENCE OF ADULT, INITIAL ENCOUNTER: Primary | ICD-10-CM

## 2024-06-20 LAB
HOLD SPECIMEN: NORMAL
HOLD SPECIMEN: NORMAL
WHOLE BLOOD HOLD COAG: NORMAL
WHOLE BLOOD HOLD SPECIMEN: NORMAL

## 2024-06-20 PROCEDURE — 96360 HYDRATION IV INFUSION INIT: CPT

## 2024-06-20 PROCEDURE — 25810000003 SODIUM CHLORIDE 0.9 % SOLUTION: Performed by: STUDENT IN AN ORGANIZED HEALTH CARE EDUCATION/TRAINING PROGRAM

## 2024-06-20 PROCEDURE — 99283 EMERGENCY DEPT VISIT LOW MDM: CPT

## 2024-06-20 RX ORDER — ACETAMINOPHEN 325 MG/1
975 TABLET ORAL ONCE
Status: COMPLETED | OUTPATIENT
Start: 2024-06-20 | End: 2024-06-20

## 2024-06-20 RX ADMIN — SODIUM CHLORIDE 1000 ML: 9 INJECTION, SOLUTION INTRAVENOUS at 11:09

## 2024-06-20 RX ADMIN — ACETAMINOPHEN 975 MG: 325 TABLET, FILM COATED ORAL at 11:08
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
## 2024-06-21 ENCOUNTER — HOSPITAL ENCOUNTER (OUTPATIENT)
Facility: HOSPITAL | Age: 24
Discharge: HOME | End: 2024-06-22
Attending: OBSTETRICS & GYNECOLOGY | Admitting: OBSTETRICS & GYNECOLOGY
Payer: COMMERCIAL

## 2024-06-21 PROCEDURE — 81002 URINALYSIS NONAUTO W/O SCOPE: CPT | Performed by: OBSTETRICS & GYNECOLOGY

## 2024-06-21 RX ORDER — SODIUM CHLORIDE 0.9 % (FLUSH) 0.9 %
10 SYRINGE (ML) INJECTION EVERY 12 HOURS SCHEDULED
Status: DISCONTINUED | OUTPATIENT
Start: 2024-06-22 | End: 2024-06-22 | Stop reason: HOSPADM

## 2024-06-21 RX ORDER — LIDOCAINE HYDROCHLORIDE 10 MG/ML
0.5 INJECTION, SOLUTION INFILTRATION; PERINEURAL ONCE AS NEEDED
Status: DISCONTINUED | OUTPATIENT
Start: 2024-06-21 | End: 2024-06-22 | Stop reason: HOSPADM

## 2024-06-21 RX ORDER — SODIUM CHLORIDE 9 MG/ML
40 INJECTION, SOLUTION INTRAVENOUS AS NEEDED
Status: DISCONTINUED | OUTPATIENT
Start: 2024-06-21 | End: 2024-06-22 | Stop reason: HOSPADM

## 2024-06-21 RX ORDER — SODIUM CHLORIDE 0.9 % (FLUSH) 0.9 %
10 SYRINGE (ML) INJECTION AS NEEDED
Status: DISCONTINUED | OUTPATIENT
Start: 2024-06-21 | End: 2024-06-22 | Stop reason: HOSPADM

## 2024-06-22 VITALS — DIASTOLIC BLOOD PRESSURE: 66 MMHG | SYSTOLIC BLOOD PRESSURE: 125 MMHG | HEART RATE: 97 BPM | TEMPERATURE: 98.2 F

## 2024-06-22 LAB
BILIRUB BLD-MCNC: NEGATIVE MG/DL
CLARITY, POC: CLEAR
COLOR UR: YELLOW
GLUCOSE UR STRIP-MCNC: NEGATIVE MG/DL
KETONES UR QL: NEGATIVE
LEUKOCYTE EST, POC: NEGATIVE
NITRITE UR-MCNC: NEGATIVE MG/ML
PH UR: 6.5 [PH] (ref 5–8)
PROT UR STRIP-MCNC: NEGATIVE MG/DL
RBC # UR STRIP: NEGATIVE /UL
SP GR UR: 1.01 (ref 1–1.03)
UROBILINOGEN UR QL: NORMAL

## 2024-06-22 PROCEDURE — G0463 HOSPITAL OUTPT CLINIC VISIT: HCPCS

## 2024-06-22 RX ORDER — ACETAMINOPHEN 500 MG
1000 TABLET ORAL ONCE
Status: COMPLETED | OUTPATIENT
Start: 2024-06-22 | End: 2024-06-22

## 2024-06-22 RX ADMIN — ACETAMINOPHEN 1000 MG: 500 TABLET, FILM COATED ORAL at 00:18

## 2024-06-22 NOTE — NON STRESS TEST
Triage Note - Nursing Documentation  Labor and Delivery Admission Log    Carol Vega  : 2000  MRN: [id number]  CSN: [id number]    Date in / Time in:  2024  Time in:     Date out / Time out:  2024  Time out: 0105    Nurse: Barbara Mittal RN    Patient Info: She is a 23 y.o. year old  at 23w4d with an MACI of 10/15/2024, by Ultrasound who was seen on the [facility] [place].    Chief Complaint:   Chief Complaint   Patient presents with    Contractions     Started at noon and has gotten worse        Provider Instructions / Disposition: No contractions traced or palpable. Pt reports pain has resolved at time of d/c. Education provided. Pt will follow up at appt in office.     Patient Active Problem List   Diagnosis    Severe episode of recurrent major depressive disorder, with psychotic features    Oppositional defiant disorder    MDD (major depressive disorder), severe    Cannabis abuse, continuous    PTSD (post-traumatic stress disorder)    Exposure to lead    Calculus of gallbladder without cholecystitis without obstruction    BMI 45.0-49.9, adult    Hx of  section    Short interval between pregnancies affecting pregnancy, antepartum    Abnormal genetic test during pregnancy    Morbid obesity with BMI of 40.0-44.9, adult       NST Documentation (Only applicable > 32 weeks):

## 2024-06-24 ENCOUNTER — HOSPITAL ENCOUNTER (EMERGENCY)
Facility: HOSPITAL | Age: 24
Discharge: HOME | End: 2024-06-25
Attending: STUDENT IN AN ORGANIZED HEALTH CARE EDUCATION/TRAINING PROGRAM
Payer: COMMERCIAL

## 2024-06-24 ENCOUNTER — APPOINTMENT (OUTPATIENT)
Dept: GENERAL RADIOLOGY | Facility: HOSPITAL | Age: 24
End: 2024-06-24
Payer: COMMERCIAL

## 2024-06-24 VITALS
OXYGEN SATURATION: 98 % | SYSTOLIC BLOOD PRESSURE: 119 MMHG | BODY MASS INDEX: 45 KG/M2 | HEIGHT: 63 IN | RESPIRATION RATE: 16 BRPM | WEIGHT: 254 LBS | DIASTOLIC BLOOD PRESSURE: 95 MMHG | HEART RATE: 101 BPM | TEMPERATURE: 98.1 F

## 2024-06-24 DIAGNOSIS — S93.401A SPRAIN OF RIGHT ANKLE, UNSPECIFIED LIGAMENT, INITIAL ENCOUNTER: Primary | ICD-10-CM

## 2024-06-24 PROCEDURE — 73610 X-RAY EXAM OF ANKLE: CPT

## 2024-06-24 PROCEDURE — 99283 EMERGENCY DEPT VISIT LOW MDM: CPT

## 2024-06-24 RX ORDER — ACETAMINOPHEN 325 MG/1
975 TABLET ORAL ONCE
Status: COMPLETED | OUTPATIENT
Start: 2024-06-25 | End: 2024-06-25

## 2024-06-25 ENCOUNTER — APPOINTMENT (OUTPATIENT)
Dept: GENERAL RADIOLOGY | Facility: HOSPITAL | Age: 24
End: 2024-06-25
Payer: COMMERCIAL

## 2024-06-25 PROCEDURE — 73630 X-RAY EXAM OF FOOT: CPT

## 2024-06-25 RX ADMIN — ACETAMINOPHEN 975 MG: 325 TABLET, FILM COATED ORAL at 00:16

## 2024-06-25 NOTE — DISCHARGE INSTRUCTIONS
Rest ice and elevate the right ankle as tolerated.  Take Tylenol as needed every 8 hours for pain.  Use your crutches to ambulate until symptoms have resolved.  If symptoms do not resolve in 5 to 7 days follow-up with orthopedic surgery for further evaluation.

## 2024-06-26 ENCOUNTER — OFFICE VISIT (OUTPATIENT)
Dept: ORTHOPEDIC SURGERY | Facility: CLINIC | Age: 24
End: 2024-06-26
Payer: COMMERCIAL

## 2024-06-26 ENCOUNTER — HOSPITAL ENCOUNTER (OUTPATIENT)
Facility: HOSPITAL | Age: 24
Discharge: HOME | End: 2024-06-26
Attending: OBSTETRICS & GYNECOLOGY | Admitting: OBSTETRICS & GYNECOLOGY
Payer: COMMERCIAL

## 2024-06-26 VITALS
OXYGEN SATURATION: 100 % | HEART RATE: 101 BPM | SYSTOLIC BLOOD PRESSURE: 136 MMHG | RESPIRATION RATE: 19 BRPM | TEMPERATURE: 98.2 F | DIASTOLIC BLOOD PRESSURE: 70 MMHG | HEIGHT: 63 IN | BODY MASS INDEX: 45.32 KG/M2 | WEIGHT: 255.8 LBS

## 2024-06-26 VITALS — WEIGHT: 254.2 LBS | BODY MASS INDEX: 45.04 KG/M2 | TEMPERATURE: 98.1 F | HEIGHT: 63 IN

## 2024-06-26 DIAGNOSIS — S99.921A INJURY OF RIGHT FOOT, INITIAL ENCOUNTER: Primary | ICD-10-CM

## 2024-06-26 LAB
BILIRUB BLD-MCNC: NEGATIVE MG/DL
CLARITY, POC: CLEAR
COLOR UR: ABNORMAL
GLUCOSE UR STRIP-MCNC: NEGATIVE MG/DL
KETONES UR QL: NEGATIVE
LEUKOCYTE EST, POC: NEGATIVE
NITRITE UR-MCNC: NEGATIVE MG/ML
PH UR: 6 [PH] (ref 5–8)
PROT UR STRIP-MCNC: ABNORMAL MG/DL
RBC # UR STRIP: NEGATIVE /UL
SP GR UR: 1.03 (ref 1–1.03)
UROBILINOGEN UR QL: NORMAL

## 2024-06-26 PROCEDURE — G0463 HOSPITAL OUTPT CLINIC VISIT: HCPCS

## 2024-06-26 PROCEDURE — 81002 URINALYSIS NONAUTO W/O SCOPE: CPT | Performed by: OBSTETRICS & GYNECOLOGY

## 2024-06-26 PROCEDURE — 99203 OFFICE O/P NEW LOW 30 MIN: CPT | Performed by: PHYSICIAN ASSISTANT

## 2024-06-26 RX ORDER — SODIUM CHLORIDE 0.9 % (FLUSH) 0.9 %
10 SYRINGE (ML) INJECTION EVERY 12 HOURS SCHEDULED
Status: DISCONTINUED | OUTPATIENT
Start: 2024-06-26 | End: 2024-06-27 | Stop reason: HOSPADM

## 2024-06-26 RX ORDER — SODIUM CHLORIDE 0.9 % (FLUSH) 0.9 %
10 SYRINGE (ML) INJECTION AS NEEDED
Status: DISCONTINUED | OUTPATIENT
Start: 2024-06-26 | End: 2024-06-27 | Stop reason: HOSPADM

## 2024-06-26 RX ORDER — SODIUM CHLORIDE 9 MG/ML
40 INJECTION, SOLUTION INTRAVENOUS AS NEEDED
Status: DISCONTINUED | OUTPATIENT
Start: 2024-06-26 | End: 2024-06-27 | Stop reason: HOSPADM

## 2024-06-26 RX ORDER — LIDOCAINE HYDROCHLORIDE 10 MG/ML
0.5 INJECTION, SOLUTION INFILTRATION; PERINEURAL ONCE AS NEEDED
Status: DISCONTINUED | OUTPATIENT
Start: 2024-06-26 | End: 2024-06-27 | Stop reason: HOSPADM

## 2024-06-26 NOTE — PROGRESS NOTES
Subjective   Patient ID: Carol Vega is a 23 y.o. right hand dominant female is being seen for orthopaedic evaluation today for right foot   Pain and Injury of the Right Foot (Reports on 24 walking down the driveway and twisting ankle causing her to fall. Seen at [facility] ER same day. Presents in boot )         Pain  Associated symptoms include arthralgias (right foot) and joint swelling (right foot).   Injury    Patient presents for the evaluation of right foot pain after twisting the foot and ankle on 2024.  She was evaluated at [facility] ER the same day as the injury, had x-rays of the right foot and ankle that were reported as negative.  She has been wearing a walking boot.  She states the pain has improved since the injury. Denies ankle pain.   She states she is currently pregnant ( 24 weeks)                                                   Past Medical History:   Diagnosis Date    Anxiety     Endometriosis     Hx of Chlamydia     with last Pregnancy    Hx of Gonorrhea     age 18    Hx of Kidney stone     Hx of Seizures     trauma induced years ago/ last one was 2-3 years ago - no medications    Major depression     Migraine     PMS (premenstrual syndrome)     PTSD (post-traumatic stress disorder)     Self-injurious behavior     hx of cutting starting at age 15    Suicide attempt     3/25/19-overdose attempt, 2017 overdose attempt    Urinary tract infection         Past Surgical History:   Procedure Laterality Date     SECTION N/A 2023    Procedure:  SECTION PRIMARY;  Surgeon: Ludwig Hernandez MD;  Location: [facility] OR;  Service: Obstetrics/Gynecology;  Laterality: N/A;    CHOLECYSTECTOMY N/A 10/16/2023    Procedure: CHOLECYSTECTOMY LAPAROSCOPIC;  Surgeon: Noemy Tsang MD;  Location: [facility] OR;  Service: General;  Laterality: N/A;    ESOPHAGEAL DILATATION      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    TONSILLECTOMY         Family History   Problem  "Relation Age of Onset    Rheum arthritis Mother     Depression Mother     Drug abuse Mother     Heart murmur Father     Drug abuse Father     Alcohol abuse Father         Social History     Socioeconomic History    Marital status: Single   Tobacco Use    Smoking status: Never    Smokeless tobacco: Never    Tobacco comments:     vapes   Vaping Use    Vaping status: Every Day    Substances: Nicotine   Substance and Sexual Activity    Alcohol use: Never    Drug use: Not Currently     Types: Marijuana     Comment: pt reports marjuana x 1 during pregnancy    Sexual activity: Yes     Partners: Male     Birth control/protection: None       Allergies   Allergen Reactions    Iodine Hives    Latex Hives       Review of Systems   Musculoskeletal:  Positive for arthralgias (right foot) and joint swelling (right foot).       Objective   Temp 98.1 °F (36.7 °C)   Ht 159.4 cm (62.75\")   Wt 115 kg (254 lb 3.2 oz)   BMI 45.39 kg/m²   Physical Exam  Vitals and nursing note reviewed.   Constitutional:       Appearance: Normal appearance.   Musculoskeletal:      Right ankle: No swelling, deformity or ecchymosis. No tenderness. Anterior drawer test negative. Normal pulse.      Right Achilles Tendon: No tenderness or defects.      Right foot: Normal capillary refill. Bony tenderness present. No deformity, Charcot foot, foot drop or laceration. Normal pulse.        Feet:    Neurological:      Mental Status: She is alert and oriented to person, place, and time.       Ortho Exam  Extremity DVT signs are negative on physical exam with negative Meka sign, no calf pain, no palpable cords, and no skin tone change   Neurologic Exam     Mental Status   Oriented to person, place, and time.            Assessment & Plan   Independent Review of Radiographic Studies:    No new imaging done today.  I personally reviewed the x-ray imaging performed at [facility] emergency room on 6/24/2024 for the evaluation of pain after injury.  There " were no comparison films available to review.  X-ray of the right foot 3 view and right ankle 3 views were submitted.  There is no acute osseous abnormality to the right foot or right ankle.    Procedures  [] No procedures were performed in office today.    Diagnoses and all orders for this visit:    1. Injury of right foot, initial encounter (Primary)       Discussion of orthopedic goals  Ice, heat, and/or modalities as beneficial  Elevate foot for residual swelling    Recommendations/Plan:  Exercise, medications, injections, other patient advice, and return appointment as noted.  Patient is encouraged to call or return for any issues or concerns.  Continue using the walking boot.  Remove the boot daily for gentle range of motion exercises.  You do not have to to sleep in the boot.  May apply topical heat and/or ice applications separately.  Follow-up in 2 weeks repeat exam and x-ray on arrival.  Return in about 2 weeks (around 7/10/2024) for XOA right foot.  Patient agreeable to call or return sooner for any concerns.

## 2024-07-03 ENCOUNTER — PATIENT ROUNDING (BHMG ONLY) (OUTPATIENT)
Dept: ORTHOPEDIC SURGERY | Facility: CLINIC | Age: 24
End: 2024-07-03
Payer: COMMERCIAL

## 2024-07-03 NOTE — PROGRESS NOTES
July 3, 2024    Hello, may I speak with Carol Vega?    My name is Silvina      I am  with [facility]  [address] [place] [id number] [phone].    Before we get started may I verify your date of birth? 2000    I am calling to officially welcome you to our practice and ask about your recent visit. Is this a good time to talk? No - voicemail left    Tell me about your visit with us. What things went well?          We're always looking for ways to make our patients' experiences even better. Do you have recommendations on ways we may improve?      Overall were you satisfied with your first visit to our practice?        I appreciate you taking the time to speak with me today. Is there anything else I can do for you?       Thank you, and have a great day.

## 2024-07-09 DIAGNOSIS — S99.921A INJURY OF RIGHT FOOT, INITIAL ENCOUNTER: Primary | ICD-10-CM

## 2024-07-15 ENCOUNTER — ROUTINE PRENATAL (OUTPATIENT)
Dept: OBSTETRICS AND GYNECOLOGY | Facility: CLINIC | Age: 24
End: 2024-07-15
Payer: COMMERCIAL

## 2024-07-15 VITALS — WEIGHT: 254 LBS | SYSTOLIC BLOOD PRESSURE: 118 MMHG | DIASTOLIC BLOOD PRESSURE: 70 MMHG | BODY MASS INDEX: 44.99 KG/M2

## 2024-07-15 DIAGNOSIS — Z11.3 SCREENING FOR STD (SEXUALLY TRANSMITTED DISEASE): ICD-10-CM

## 2024-07-15 DIAGNOSIS — O09.899 SHORT INTERVAL BETWEEN PREGNANCIES AFFECTING PREGNANCY, ANTEPARTUM: ICD-10-CM

## 2024-07-15 DIAGNOSIS — Z98.891 HX OF CESAREAN SECTION: ICD-10-CM

## 2024-07-15 DIAGNOSIS — Z34.82 ENCOUNTER FOR SUPERVISION OF OTHER NORMAL PREGNANCY IN SECOND TRIMESTER: Primary | ICD-10-CM

## 2024-07-15 PROCEDURE — 99213 OFFICE O/P EST LOW 20 MIN: CPT | Performed by: MIDWIFE
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
## 2024-07-17 LAB
A VAGINAE DNA VAG QL NAA+PROBE: ABNORMAL SCORE
BASOPHILS # BLD AUTO: 0.04 10*3/MM3 (ref 0–0.2)
BASOPHILS NFR BLD AUTO: 0.3 % (ref 0–1.5)
BVAB2 DNA VAG QL NAA+PROBE: ABNORMAL SCORE
C ALBICANS DNA VAG QL NAA+PROBE: POSITIVE
C GLABRATA DNA VAG QL NAA+PROBE: NEGATIVE
C TRACH DNA SPEC QL NAA+PROBE: NEGATIVE
EOSINOPHIL # BLD AUTO: 0.23 10*3/MM3 (ref 0–0.4)
EOSINOPHIL NFR BLD AUTO: 1.6 % (ref 0.3–6.2)
ERYTHROCYTE [DISTWIDTH] IN BLOOD BY AUTOMATED COUNT: 16.4 % (ref 12.3–15.4)
GLUCOSE 1H P 50 G GLC PO SERPL-MCNC: 132 MG/DL (ref 65–139)
HCT VFR BLD AUTO: 28.7 % (ref 34–46.6)
HGB BLD-MCNC: 9.2 G/DL (ref 12–15.9)
IMM GRANULOCYTES # BLD AUTO: 0.29 10*3/MM3 (ref 0–0.05)
IMM GRANULOCYTES NFR BLD AUTO: 2 % (ref 0–0.5)
LYMPHOCYTES # BLD AUTO: 2.35 10*3/MM3 (ref 0.7–3.1)
LYMPHOCYTES NFR BLD AUTO: 16.2 % (ref 19.6–45.3)
MCH RBC QN AUTO: 24.5 PG (ref 26.6–33)
MCHC RBC AUTO-ENTMCNC: 32.1 G/DL (ref 31.5–35.7)
MCV RBC AUTO: 76.3 FL (ref 79–97)
MEGA1 DNA VAG QL NAA+PROBE: ABNORMAL SCORE
MONOCYTES # BLD AUTO: 0.86 10*3/MM3 (ref 0.1–0.9)
MONOCYTES NFR BLD AUTO: 5.9 % (ref 5–12)
N GONORRHOEA DNA VAG QL NAA+PROBE: NEGATIVE
NEUTROPHILS # BLD AUTO: 10.7 10*3/MM3 (ref 1.7–7)
NEUTROPHILS NFR BLD AUTO: 74 % (ref 42.7–76)
NRBC BLD AUTO-RTO: 0 /100 WBC (ref 0–0.2)
PLATELET # BLD AUTO: 249 10*3/MM3 (ref 140–450)
RBC # BLD AUTO: 3.76 10*6/MM3 (ref 3.77–5.28)
T VAGINALIS DNA VAG QL NAA+PROBE: NEGATIVE
WBC # BLD AUTO: 14.47 10*3/MM3 (ref 3.4–10.8)

## 2024-07-26 ENCOUNTER — TELEMEDICINE (OUTPATIENT)
Dept: INTERNAL MEDICINE | Facility: CLINIC | Age: 24
End: 2024-07-26
Payer: COMMERCIAL

## 2024-07-26 DIAGNOSIS — J01.90 ACUTE NON-RECURRENT SINUSITIS, UNSPECIFIED LOCATION: Primary | ICD-10-CM

## 2024-07-26 PROCEDURE — 1126F AMNT PAIN NOTED NONE PRSNT: CPT | Performed by: FAMILY MEDICINE

## 2024-07-26 PROCEDURE — 99213 OFFICE O/P EST LOW 20 MIN: CPT | Performed by: FAMILY MEDICINE

## 2024-07-26 RX ORDER — DEXTROMETHORPHAN POLISTIREX 30 MG/5ML
60 SUSPENSION ORAL EVERY 12 HOURS SCHEDULED
Qty: 280 ML | Refills: 1 | Status: SHIPPED | OUTPATIENT
Start: 2024-07-26

## 2024-07-26 RX ORDER — AMOXICILLIN AND CLAVULANATE POTASSIUM 875; 125 MG/1; MG/1
1 TABLET, FILM COATED ORAL 2 TIMES DAILY
Qty: 20 TABLET | Refills: 0 | Status: SHIPPED | OUTPATIENT
Start: 2024-07-26

## 2024-07-26 NOTE — PROGRESS NOTES
You have chosen to receive care through a telehealth visit.  Do you consent to use a video/audio connection for your medical care today? Yes  Currently in [place], [place].   Participating parties: Dr. Ángela Panchal, Светлана Johnston,  Carol Vega.

## 2024-07-26 NOTE — PROGRESS NOTES
Carol Vega is a 23 y.o. female.    Chief Complaint   Patient presents with    Nasal Congestion     Is having congestion. Has been on going for a week.    Sore Throat     On going for 4 days.     Headache    Generalized Body Aches     Has been on going since last night, aching all over. And chilling.     Earache     On going for about 3-4 days. Left ear is painful.      During today's visit, I reviewed the documented allergies, medications, chief complaint, and pertinent vitals.  I have confirmed with the patient that there have been no changes since this information was discussed with my clinical team member. The patient completed the video visit from her sister's home and I from my office.       HPI   Patient reports they have not been feeling well for 1week(s). She admits to nasal congestion, ear pain, sore throat, headache, drainage, mild cough.  She denies dyspnea, wheezing.  They have tried nasal spray, tylenol for this issue without good response.    The following portions of the patient's history were reviewed and updated as appropriate: allergies, current medications, past family history, past medical history, past social history, past surgical history and problem list.     Allergies   Allergen Reactions    Iodine Hives    Latex Hives         Current Outpatient Medications:     ferrous sulfate 325 (65 FE) MG tablet, Take 1 tablet by mouth 2 (Two) Times a Day., Disp: 60 tablet, Rfl: 4    Prenatal Vit-Fe Fumarate-FA (PRENATAL VITAMINS PO), Take  by mouth., Disp: , Rfl:     amoxicillin-clavulanate (AUGMENTIN) 875-125 MG per tablet, Take 1 tablet by mouth 2 (Two) Times a Day., Disp: 20 tablet, Rfl: 0    dextromethorphan polistirex ER (Delsym) 30 MG/5ML Suspension Extended Release oral suspension, Take 10 mL by mouth Every 12 (Twelve) Hours., Disp: 280 mL, Rfl: 1    ROS    Review of Systems   Constitutional:  Negative for fever.   HENT:  Positive for congestion, ear pain, postnasal drip and sore throat.     Respiratory:  Positive for cough. Negative for shortness of breath and wheezing.    Musculoskeletal:  Positive for arthralgias.   Neurological:  Positive for headache.       There were no vitals filed for this visit.      Physical Exam     Physical Exam  Constitutional:       General: She is not in acute distress.     Appearance: Normal appearance. She is well-developed.   HENT:      Head: Normocephalic and atraumatic.      Right Ear: External ear normal.      Left Ear: External ear normal.   Eyes:      Extraocular Movements: Extraocular movements intact.      Conjunctiva/sclera: Conjunctivae normal.   Pulmonary:      Effort: Pulmonary effort is normal. No respiratory distress.   Skin:     Coloration: Skin is not pale.   Neurological:      Mental Status: She is alert and oriented to person, place, and time.      Cranial Nerves: No cranial nerve deficit.   Psychiatric:         Mood and Affect: Mood normal.         Behavior: Behavior normal.         Assessment/Plan    Diagnoses and all orders for this visit:    1. Acute non-recurrent sinusitis, unspecified location (Primary)    Other orders  -     amoxicillin-clavulanate (AUGMENTIN) 875-125 MG per tablet; Take 1 tablet by mouth 2 (Two) Times a Day.  Dispense: 20 tablet; Refill: 0  -     dextromethorphan polistirex ER (Delsym) 30 MG/5ML Suspension Extended Release oral suspension; Take 10 mL by mouth Every 12 (Twelve) Hours.  Dispense: 280 mL; Refill: 1      Will treat with a course of antibiotics.  Continue nasal spray twice a day.  May take delsym as needed for cough.       New Medications Ordered This Visit   Medications    amoxicillin-clavulanate (AUGMENTIN) 875-125 MG per tablet     Sig: Take 1 tablet by mouth 2 (Two) Times a Day.     Dispense:  20 tablet     Refill:  0    dextromethorphan polistirex ER (Delsym) 30 MG/5ML Suspension Extended Release oral suspension     Sig: Take 10 mL by mouth Every 12 (Twelve) Hours.     Dispense:  280 mL     Refill:  1        No orders of the defined types were placed in this encounter.      Return if symptoms worsen or fail to improve.    Ángela Panchal, DO

## 2024-07-29 ENCOUNTER — ROUTINE PRENATAL (OUTPATIENT)
Dept: OBSTETRICS AND GYNECOLOGY | Facility: CLINIC | Age: 24
End: 2024-07-29
Payer: COMMERCIAL

## 2024-07-29 VITALS — DIASTOLIC BLOOD PRESSURE: 82 MMHG | SYSTOLIC BLOOD PRESSURE: 128 MMHG | WEIGHT: 259 LBS | BODY MASS INDEX: 45.88 KG/M2

## 2024-07-29 DIAGNOSIS — Z34.93 PRENATAL CARE IN THIRD TRIMESTER: Primary | ICD-10-CM

## 2024-07-29 DIAGNOSIS — B37.9 YEAST INFECTION: Primary | ICD-10-CM

## 2024-07-29 DIAGNOSIS — Z98.891 HX OF CESAREAN SECTION: ICD-10-CM

## 2024-07-29 DIAGNOSIS — O09.899 SHORT INTERVAL BETWEEN PREGNANCIES AFFECTING PREGNANCY, ANTEPARTUM: ICD-10-CM

## 2024-07-29 DIAGNOSIS — Z30.017 ENCOUNTER FOR INITIAL PRESCRIPTION OF NEXPLANON: ICD-10-CM

## 2024-07-29 PROCEDURE — 99213 OFFICE O/P EST LOW 20 MIN: CPT | Performed by: OBSTETRICS & GYNECOLOGY

## 2024-07-29 NOTE — PROGRESS NOTES
Prenatal Care Visit    Subjective   Chief Complaint   Patient presents with    Routine Prenatal Visit     Burning in nipples and they turn purple       History:   Carol is a  currently at 28w6d who presents for a prenatal care visit today.    No major issues.    Social History    Tobacco Use      Smoking status: Never        Passive exposure: Never      Smokeless tobacco: Never      Tobacco comments: vapes       Objective   /82   Wt 117 kg (259 lb)   LMP  (LMP Unknown)   BMI 45.88 kg/m²   Physical Exam:  Normal, gestational age-appropriate exam today        Plan   Medical Decision Making:    I have reviewed the prenatal labs and ultrasound(s) today. I have reviewed the most recent prenatal progress note(s).    Diagnosis: Supervision of high risk pregnancy  Previous C/S with planned repeat C/S  Short interval pregnancy   BMI 46   Tests/Orders/Rx today: No orders of the defined types were placed in this encounter.      Medication Management: Nexplanon     Topics discussed: Prenatal care milestones  kick counts and fetal movement  PIH precautions   labor signs and symptoms   Wants Nexplanon placed during hospital stay  [id number] inconclusive x 3, QS and U/S reassuring, f/u 3D scan with MFM on    Tests next visit: None   Next visit: 2 weeks     Ludwig Hernandez MD  Obstetrics and Gynecology  [facility]     Improved. Pt initially presented with AHRF 2/2 viral PNA (parainfluenza+) c/b possible superimposed bacterial PNA and COPD exacerbation. At home, for his COPD/Asthma, pt is on Daliresp 500mcg daily, spiriva daily PRN, symbicort BID PRN, and Ventolin QID PRN.  - s/p intubation on 10/8, extubation/reintubation on 10/12, extubation on 10/14 to BiPAP and transitioning to O2NC  - TTE (10/16): EF 60-65%; minimal MR; Small pericardial effusion measuring 0.2cm anterior to RV; no evidence of RA or RV collapse.  - CXR (10/18): small pleural effusions  - CT chest (10/22): thick linear consolidation in the right lower lobe most likely represents atelectasis.  - c/w duonebs q4h and inhaled budesonide 0.25mg BID  - c/w guaifenesin PRN for cough; c/w incentive spirometry and acapella device for airway clearance  - continue to monitor respiratory status, currently sating well on O2NC, wean as tolerated  - c/w BIPAP qHS for now  - s/p solumedrol (10/8-10/16)  - s/p 5-day abx course of CTX->Levaquin ->CTX (10/8-10/12)   - s/p Zosyn (10/18-10/23), was started for concern for superimposed bacterial PNA given fever overnight on 10/18 and worsened leukocytosis, but CT chest only found thick linear consolidation in the right lower lobe most likely atelectasis.  - Appreciate Pulm recs

## 2024-08-12 ENCOUNTER — ROUTINE PRENATAL (OUTPATIENT)
Dept: OBSTETRICS AND GYNECOLOGY | Facility: CLINIC | Age: 24
End: 2024-08-12
Payer: COMMERCIAL

## 2024-08-12 VITALS — BODY MASS INDEX: 46.94 KG/M2 | DIASTOLIC BLOOD PRESSURE: 80 MMHG | WEIGHT: 265 LBS | SYSTOLIC BLOOD PRESSURE: 128 MMHG

## 2024-08-12 DIAGNOSIS — O28.5 ABNORMAL GENETIC TEST DURING PREGNANCY: ICD-10-CM

## 2024-08-12 DIAGNOSIS — O09.899 SHORT INTERVAL BETWEEN PREGNANCIES AFFECTING PREGNANCY, ANTEPARTUM: Primary | ICD-10-CM

## 2024-08-12 DIAGNOSIS — Z98.891 HX OF CESAREAN SECTION: ICD-10-CM

## 2024-08-12 PROCEDURE — 99213 OFFICE O/P EST LOW 20 MIN: CPT | Performed by: MIDWIFE

## 2024-08-12 NOTE — PROGRESS NOTES
Chief Complaint   Patient presents with    Routine Prenatal Visit     No Complaints/concerns        HPI: Carol is a  currently at 30w6d here for prenatal visit who reports the following:  Baby is active. She is having some lower abdominal discomfort. She has appt with PD[facility] on .                EXAM:     Vitals:    24 1410   BP: 128/80      Abdomen:   See prenatal flowsheet as noted and reviewed, soft, nontender   Pelvic:  See prenatal flowsheet as noted and reviewed   Urine:  See prenatal flowsheet as noted and reviewed    Lab Results   Component Value Date    ABO O 2024    RH Positive 2024    ABSCRN Negative 2024       MDM:  Impression: Previous C/S with planned repeat C/S  Abnormal genetic testing  Round ligament pain  Short interval pregnancy   Tests done today: none   Topics discussed: kick counts and fetal movement   labor signs and symptoms  Maternity belt/support garment  Reviewed OB labs   Tests next visit: none                RTO:                        2 weeks    This note was electronically signed.  Heather Joyner, APRN  2024

## 2024-08-20 ENCOUNTER — HOSPITAL ENCOUNTER (OUTPATIENT)
Dept: WOMENS IMAGING | Facility: HOSPITAL | Age: 24
Discharge: HOME | End: 2024-08-20
Admitting: OBSTETRICS & GYNECOLOGY
Payer: COMMERCIAL

## 2024-08-20 ENCOUNTER — OFFICE VISIT (OUTPATIENT)
Dept: OBSTETRICS AND GYNECOLOGY | Facility: HOSPITAL | Age: 24
End: 2024-08-20
Payer: COMMERCIAL

## 2024-08-20 DIAGNOSIS — Z98.891 HX OF CESAREAN SECTION: ICD-10-CM

## 2024-08-20 DIAGNOSIS — O09.899 SHORT INTERVAL BETWEEN PREGNANCIES AFFECTING PREGNANCY, ANTEPARTUM: ICD-10-CM

## 2024-08-20 DIAGNOSIS — E66.01 MORBID OBESITY WITH BMI OF 40.0-44.9, ADULT: ICD-10-CM

## 2024-08-20 DIAGNOSIS — O28.5 ABNORMAL GENETIC TEST DURING PREGNANCY: ICD-10-CM

## 2024-08-20 DIAGNOSIS — O28.5 ABNORMAL GENETIC TEST DURING PREGNANCY: Primary | ICD-10-CM

## 2024-08-20 DIAGNOSIS — Z34.90 PREGNANCY, UNSPECIFIED GESTATIONAL AGE: ICD-10-CM

## 2024-08-20 PROCEDURE — 76816 OB US FOLLOW-UP PER FETUS: CPT | Performed by: OBSTETRICS & GYNECOLOGY

## 2024-08-20 PROCEDURE — 76819 FETAL BIOPHYS PROFIL W/O NST: CPT

## 2024-08-20 PROCEDURE — 76819 FETAL BIOPHYS PROFIL W/O NST: CPT | Performed by: OBSTETRICS & GYNECOLOGY

## 2024-08-20 PROCEDURE — 76816 OB US FOLLOW-UP PER FETUS: CPT

## 2024-08-26 ENCOUNTER — ROUTINE PRENATAL (OUTPATIENT)
Dept: OBSTETRICS AND GYNECOLOGY | Facility: CLINIC | Age: 24
End: 2024-08-26
Payer: COMMERCIAL

## 2024-08-26 VITALS — SYSTOLIC BLOOD PRESSURE: 130 MMHG | DIASTOLIC BLOOD PRESSURE: 82 MMHG | WEIGHT: 268.6 LBS | BODY MASS INDEX: 47.58 KG/M2

## 2024-08-26 DIAGNOSIS — Z98.891 HX OF CESAREAN SECTION: ICD-10-CM

## 2024-08-26 DIAGNOSIS — Z34.93 PRENATAL CARE IN THIRD TRIMESTER: Primary | ICD-10-CM

## 2024-08-26 DIAGNOSIS — O09.899 SHORT INTERVAL BETWEEN PREGNANCIES AFFECTING PREGNANCY, ANTEPARTUM: ICD-10-CM

## 2024-08-26 PROCEDURE — 99213 OFFICE O/P EST LOW 20 MIN: CPT | Performed by: OBSTETRICS & GYNECOLOGY

## 2024-08-26 NOTE — PROGRESS NOTES
Prenatal Care Visit    Subjective   Chief Complaint   Patient presents with    Routine Prenatal Visit     Prenatal visit with no problems or concerns.       History:   Carol is a  currently at 32w6d who presents for a prenatal care visit today.    No major issues.    Social History    Tobacco Use      Smoking status: Never        Passive exposure: Never      Smokeless tobacco: Never      Tobacco comments: vapes       Objective   /82   Wt 122 kg (268 lb 9.6 oz)   LMP  (LMP Unknown)   BMI 47.58 kg/m²   Physical Exam:  Normal, gestational age-appropriate exam today        Plan   Medical Decision Making:    I have reviewed the prenatal labs and ultrasound(s) today. I have reviewed the most recent prenatal progress note(s).    Diagnosis: Supervision of high risk pregnancy  Previous C/S with planned repeat C/S  Short interval pregnancy   BMI 48   Tests/Orders/Rx today: No orders of the defined types were placed in this encounter.      Medication Management: Nexplanon     Topics discussed: Prenatal care milestones  kick counts and fetal movement  PIH precautions   labor signs and symptoms   Wants Nexplanon placed during hospital stay   Tests next visit: None   Next visit: 2 weeks     Ludwig Hernandez MD  Obstetrics and Gynecology  [facility]

## 2024-09-04 ENCOUNTER — HOSPITAL ENCOUNTER (OUTPATIENT)
Facility: HOSPITAL | Age: 24
Discharge: HOME | End: 2024-09-04
Attending: OBSTETRICS & GYNECOLOGY | Admitting: OBSTETRICS & GYNECOLOGY
Payer: COMMERCIAL

## 2024-09-04 ENCOUNTER — TELEPHONE (OUTPATIENT)
Dept: OBSTETRICS AND GYNECOLOGY | Facility: CLINIC | Age: 24
End: 2024-09-04

## 2024-09-04 ENCOUNTER — HOSPITAL ENCOUNTER (OUTPATIENT)
Facility: HOSPITAL | Age: 24
Setting detail: SURGERY ADMIT
End: 2024-09-04
Attending: OBSTETRICS & GYNECOLOGY | Admitting: OBSTETRICS & GYNECOLOGY
Payer: COMMERCIAL

## 2024-09-04 VITALS — WEIGHT: 270.6 LBS | HEIGHT: 63 IN | TEMPERATURE: 99.4 F | BODY MASS INDEX: 47.95 KG/M2

## 2024-09-04 DIAGNOSIS — D64.9 ACUTE ON CHRONIC ANEMIA: ICD-10-CM

## 2024-09-04 LAB
A1 MICROGLOB PLACENTAL VAG QL: NEGATIVE
BILIRUB BLD-MCNC: NEGATIVE MG/DL
CLARITY, POC: CLEAR
COLOR UR: YELLOW
GLUCOSE UR STRIP-MCNC: NEGATIVE MG/DL
KETONES UR QL: NEGATIVE
LEUKOCYTE EST, POC: NEGATIVE
NITRITE UR-MCNC: NEGATIVE MG/ML
PH UR: 6.5 [PH] (ref 5–8)
PROT UR STRIP-MCNC: NEGATIVE MG/DL
RBC # UR STRIP: NEGATIVE /UL
SP GR UR: 1.01 (ref 1–1.03)
UROBILINOGEN UR QL: NORMAL

## 2024-09-04 PROCEDURE — 59025 FETAL NON-STRESS TEST: CPT | Performed by: OBSTETRICS & GYNECOLOGY

## 2024-09-04 PROCEDURE — 84112 EVAL AMNIOTIC FLUID PROTEIN: CPT | Performed by: OBSTETRICS & GYNECOLOGY

## 2024-09-04 PROCEDURE — 81002 URINALYSIS NONAUTO W/O SCOPE: CPT | Performed by: OBSTETRICS & GYNECOLOGY

## 2024-09-04 PROCEDURE — G0463 HOSPITAL OUTPT CLINIC VISIT: HCPCS

## 2024-09-04 PROCEDURE — 59025 FETAL NON-STRESS TEST: CPT

## 2024-09-04 RX ORDER — SODIUM CHLORIDE 0.9 % (FLUSH) 0.9 %
10 SYRINGE (ML) INJECTION EVERY 12 HOURS SCHEDULED
Status: DISCONTINUED | OUTPATIENT
Start: 2024-09-04 | End: 2024-09-04 | Stop reason: HOSPADM

## 2024-09-04 RX ORDER — SODIUM CHLORIDE 0.9 % (FLUSH) 0.9 %
10 SYRINGE (ML) INJECTION AS NEEDED
Status: DISCONTINUED | OUTPATIENT
Start: 2024-09-04 | End: 2024-09-04 | Stop reason: HOSPADM

## 2024-09-04 RX ORDER — LIDOCAINE HYDROCHLORIDE 10 MG/ML
0.5 INJECTION, SOLUTION INFILTRATION; PERINEURAL ONCE AS NEEDED
Status: DISCONTINUED | OUTPATIENT
Start: 2024-09-04 | End: 2024-09-04 | Stop reason: HOSPADM

## 2024-09-04 RX ORDER — SODIUM CHLORIDE 9 MG/ML
40 INJECTION, SOLUTION INTRAVENOUS AS NEEDED
Status: DISCONTINUED | OUTPATIENT
Start: 2024-09-04 | End: 2024-09-04 | Stop reason: HOSPADM

## 2024-09-04 NOTE — NON STRESS TEST
Triage Note - Nursing Documentation  Labor and Delivery Admission Log    Carol Vega  : 2000  MRN: [id number]  CSN: [id number]    Date in / Time in:  2024  Time in: 1129    Date out / Time out:    Time out: 1220    Nurse: Jose Khalil RN    Patient Info: She is a 24 y.o. year old  at 34w1d with an MACI of 10/15/2024, by Ultrasound who was seen on the [facility].    Chief Complaint:   Chief Complaint   Patient presents with    Leaking Fluid     Leaking fluid for a week, clear        Provider Instructions / Disposition: Pt presented to labor cash with reports of leaking fluid for one week.  She reports having felt fetal movement.  Amnisure was negative.  Discharged in stable condition.  Take home instructions, including fetal kick counts and  rupture and  labor given with verbalization of understanding per pt.  She has a scheduled appointment on 2024.    Patient Active Problem List   Diagnosis    Severe episode of recurrent major depressive disorder, with psychotic features    Oppositional defiant disorder    MDD (major depressive disorder), severe    Cannabis abuse, continuous    PTSD (post-traumatic stress disorder)    Exposure to lead    Calculus of gallbladder without cholecystitis without obstruction    BMI 45.0-49.9, adult    Hx of  section    Short interval between pregnancies affecting pregnancy, antepartum    Abnormal genetic test during pregnancy    Morbid obesity with BMI of 40.0-44.9, adult       NST Documentation (Only applicable > 32 weeks): Interpretation A  Nonstress Test Interpretation A: Reactive (24 1230 : Jose Khalil, KORTNEY)

## 2024-09-09 ENCOUNTER — ROUTINE PRENATAL (OUTPATIENT)
Dept: OBSTETRICS AND GYNECOLOGY | Facility: CLINIC | Age: 24
End: 2024-09-09
Payer: COMMERCIAL

## 2024-09-09 ENCOUNTER — PREP FOR SURGERY (OUTPATIENT)
Dept: OTHER | Facility: HOSPITAL | Age: 24
End: 2024-09-09
Payer: COMMERCIAL

## 2024-09-09 VITALS — SYSTOLIC BLOOD PRESSURE: 124 MMHG | DIASTOLIC BLOOD PRESSURE: 82 MMHG | BODY MASS INDEX: 47.63 KG/M2 | WEIGHT: 268.9 LBS

## 2024-09-09 DIAGNOSIS — O28.5 ABNORMAL GENETIC TEST DURING PREGNANCY: ICD-10-CM

## 2024-09-09 DIAGNOSIS — Z98.891 PREVIOUS CESAREAN SECTION: Primary | ICD-10-CM

## 2024-09-09 DIAGNOSIS — Z98.891 PREVIOUS CESAREAN SECTION: ICD-10-CM

## 2024-09-09 DIAGNOSIS — Z34.93 PRENATAL CARE IN THIRD TRIMESTER: Primary | ICD-10-CM

## 2024-09-09 DIAGNOSIS — O09.899 SHORT INTERVAL BETWEEN PREGNANCIES AFFECTING PREGNANCY, ANTEPARTUM: ICD-10-CM

## 2024-09-09 DIAGNOSIS — F41.9 ANXIETY DURING PREGNANCY: ICD-10-CM

## 2024-09-09 DIAGNOSIS — O99.019 MATERNAL ANEMIA IN PREGNANCY, ANTEPARTUM: ICD-10-CM

## 2024-09-09 DIAGNOSIS — O99.340 ANXIETY DURING PREGNANCY: ICD-10-CM

## 2024-09-09 DIAGNOSIS — O32.2XX0 TRANSVERSE LIE OF FETUS, SINGLE OR UNSPECIFIED FETUS: ICD-10-CM

## 2024-09-09 PROBLEM — Z34.90 PREGNANCY: Status: ACTIVE | Noted: 2024-09-09

## 2024-09-09 PROCEDURE — 99213 OFFICE O/P EST LOW 20 MIN: CPT | Performed by: STUDENT IN AN ORGANIZED HEALTH CARE EDUCATION/TRAINING PROGRAM

## 2024-09-09 RX ORDER — SODIUM CHLORIDE 0.9 % (FLUSH) 0.9 %
10 SYRINGE (ML) INJECTION AS NEEDED
OUTPATIENT
Start: 2024-09-09

## 2024-09-09 RX ORDER — CITRIC ACID/SODIUM CITRATE 334-500MG
30 SOLUTION, ORAL ORAL ONCE
OUTPATIENT
Start: 2024-09-09 | End: 2024-09-09

## 2024-09-09 RX ORDER — MISOPROSTOL 200 UG/1
800 TABLET ORAL AS NEEDED
OUTPATIENT
Start: 2024-09-09

## 2024-09-09 RX ORDER — MORPHINE SULFATE 2 MG/ML
2 INJECTION, SOLUTION INTRAMUSCULAR; INTRAVENOUS
OUTPATIENT
Start: 2024-09-09

## 2024-09-09 RX ORDER — DIPHENHYDRAMINE HYDROCHLORIDE 50 MG/ML
25 INJECTION INTRAMUSCULAR; INTRAVENOUS NIGHTLY PRN
OUTPATIENT
Start: 2024-09-09

## 2024-09-09 RX ORDER — OXYTOCIN/0.9 % SODIUM CHLORIDE 30/500 ML
42 PLASTIC BAG, INJECTION (ML) INTRAVENOUS AS NEEDED
OUTPATIENT
Start: 2024-09-09 | End: 2024-09-10

## 2024-09-09 RX ORDER — ONDANSETRON 4 MG/1
4 TABLET, ORALLY DISINTEGRATING ORAL EVERY 6 HOURS PRN
OUTPATIENT
Start: 2024-09-09

## 2024-09-09 RX ORDER — CARBOPROST TROMETHAMINE 250 UG/ML
250 INJECTION, SOLUTION INTRAMUSCULAR AS NEEDED
OUTPATIENT
Start: 2024-09-09

## 2024-09-09 RX ORDER — ACETAMINOPHEN 500 MG
1000 TABLET ORAL ONCE
OUTPATIENT
Start: 2024-09-09 | End: 2024-09-09

## 2024-09-09 RX ORDER — OXYTOCIN/0.9 % SODIUM CHLORIDE 30/500 ML
333 PLASTIC BAG, INJECTION (ML) INTRAVENOUS ONCE
OUTPATIENT
Start: 2024-09-09 | End: 2024-09-09

## 2024-09-09 RX ORDER — METHYLERGONOVINE MALEATE 0.2 MG/ML
200 INJECTION INTRAVENOUS ONCE AS NEEDED
OUTPATIENT
Start: 2024-09-09

## 2024-09-09 RX ORDER — IBUPROFEN 600 MG/1
600 TABLET, FILM COATED ORAL EVERY 6 HOURS SCHEDULED
OUTPATIENT
Start: 2024-09-09

## 2024-09-09 RX ORDER — SODIUM CHLORIDE, SODIUM LACTATE, POTASSIUM CHLORIDE, CALCIUM CHLORIDE 600; 310; 30; 20 MG/100ML; MG/100ML; MG/100ML; MG/100ML
125 INJECTION, SOLUTION INTRAVENOUS CONTINUOUS
OUTPATIENT
Start: 2024-09-09

## 2024-09-09 RX ORDER — KETOROLAC TROMETHAMINE 30 MG/ML
30 INJECTION, SOLUTION INTRAMUSCULAR; INTRAVENOUS ONCE
OUTPATIENT
Start: 2024-09-09 | End: 2024-09-09

## 2024-09-09 RX ORDER — FAMOTIDINE 10 MG/ML
20 INJECTION, SOLUTION INTRAVENOUS ONCE AS NEEDED
OUTPATIENT
Start: 2024-09-09

## 2024-09-09 RX ORDER — SODIUM CHLORIDE 9 MG/ML
40 INJECTION, SOLUTION INTRAVENOUS AS NEEDED
OUTPATIENT
Start: 2024-09-09

## 2024-09-09 RX ORDER — DIPHENHYDRAMINE HCL 25 MG
25 CAPSULE ORAL NIGHTLY PRN
OUTPATIENT
Start: 2024-09-09

## 2024-09-09 RX ORDER — TRANEXAMIC ACID 10 MG/ML
1000 INJECTION, SOLUTION INTRAVENOUS ONCE AS NEEDED
OUTPATIENT
Start: 2024-09-09

## 2024-09-09 RX ORDER — FAMOTIDINE 10 MG/ML
20 INJECTION, SOLUTION INTRAVENOUS ONCE
OUTPATIENT
Start: 2024-09-09 | End: 2024-09-09

## 2024-09-09 RX ORDER — LIDOCAINE HYDROCHLORIDE 10 MG/ML
0.5 INJECTION, SOLUTION INFILTRATION; PERINEURAL ONCE AS NEEDED
OUTPATIENT
Start: 2024-09-09

## 2024-09-09 RX ORDER — ONDANSETRON 2 MG/ML
4 INJECTION INTRAMUSCULAR; INTRAVENOUS EVERY 6 HOURS PRN
OUTPATIENT
Start: 2024-09-09

## 2024-09-09 RX ORDER — PROMETHAZINE HYDROCHLORIDE 12.5 MG/1
12.5 TABLET ORAL EVERY 6 HOURS PRN
OUTPATIENT
Start: 2024-09-09

## 2024-09-09 RX ORDER — FAMOTIDINE 20 MG/1
20 TABLET, FILM COATED ORAL ONCE AS NEEDED
OUTPATIENT
Start: 2024-09-09

## 2024-09-09 RX ORDER — ACETAMINOPHEN 325 MG/1
650 TABLET ORAL EVERY 6 HOURS
OUTPATIENT
Start: 2024-09-09

## 2024-09-09 RX ORDER — SODIUM CHLORIDE 0.9 % (FLUSH) 0.9 %
10 SYRINGE (ML) INJECTION EVERY 12 HOURS SCHEDULED
OUTPATIENT
Start: 2024-09-09

## 2024-09-09 NOTE — PROGRESS NOTES
Prenatal Care Visit    Subjective   Chief Complaint   Patient presents with    Routine Prenatal Visit     No concerns.      History:   Carol is a  currently at 34w6d who presents for a prenatal care visit today.    Denies regular CTX, VB, LOF. Reports (+) FM.     Objective   /82   Wt 122 kg (268 lb 14.4 oz)   LMP  (LMP Unknown)   BMI 47.63 kg/m²   Physical Exam:  Normal, gestational age-appropriate exam today      Assessment & Plan     IUP @ 34w6d  Routine care: I have reviewed the prenatal labs and ultrasound(s) today. I have reviewed the most recent prenatal progress note(s).   Prior x 1: G1 for failure to progress. Plans rCS, will schedule today.  Short interval pregnancy: last delivery via CS 2023  Abnormal cfDNA: s/p consultation with [facility]. Normal anatomy scan and Quad screen --> low risk for aneuploidy. Repeat scan at 32 wks also reassuring.   Anxiety, PTSD: no meds, stable  Anemia: continue iron supplement  Transverse presentation on 32 wk scan: repeat scan for presentation next visit.     Diagnosis Plan   1. Prenatal care in third trimester        2. Previous  section        3. Short interval between pregnancies affecting pregnancy, antepartum        4. Abnormal genetic test during pregnancy        5. Anxiety during pregnancy        6. Maternal anemia in pregnancy, antepartum        7. Transverse lie of fetus, single or unspecified fetus           Medication Management: continue PNV, iron    Topics discussed: Prenatal care milestones  Iron supplementation  Kick counts and fetal movement   labor signs and symptoms   section risks, benefits  Breastfeeding  Postpartum contraception  LARC   Tests next visit: GBS testing  U/S for fetal presentation   Next visit: 2 week(s)     Tenisha Lane MD  Obstetrics and Gynecology  [facility]

## 2024-09-23 ENCOUNTER — ROUTINE PRENATAL (OUTPATIENT)
Dept: OBSTETRICS AND GYNECOLOGY | Facility: CLINIC | Age: 24
End: 2024-09-23
Payer: COMMERCIAL

## 2024-09-23 VITALS — BODY MASS INDEX: 48.54 KG/M2 | WEIGHT: 274 LBS | DIASTOLIC BLOOD PRESSURE: 82 MMHG | SYSTOLIC BLOOD PRESSURE: 122 MMHG

## 2024-09-23 DIAGNOSIS — Z98.891 HX OF CESAREAN SECTION: ICD-10-CM

## 2024-09-23 DIAGNOSIS — Z34.93 PRENATAL CARE IN THIRD TRIMESTER: Primary | ICD-10-CM

## 2024-09-23 DIAGNOSIS — O32.2XX0: ICD-10-CM

## 2024-09-23 DIAGNOSIS — Z36.85 ANTENATAL SCREENING FOR STREPTOCOCCUS B: ICD-10-CM

## 2024-09-27 LAB
CLINDAMYCIN ISLT KB: ABNORMAL
GP B STREP DNA SPEC QL NAA+PROBE: POSITIVE
ORGANISM ID: ABNORMAL

## 2024-10-02 ENCOUNTER — ROUTINE PRENATAL (OUTPATIENT)
Dept: OBSTETRICS AND GYNECOLOGY | Facility: CLINIC | Age: 24
End: 2024-10-02
Payer: COMMERCIAL

## 2024-10-02 VITALS — DIASTOLIC BLOOD PRESSURE: 80 MMHG | BODY MASS INDEX: 48.89 KG/M2 | WEIGHT: 276 LBS | SYSTOLIC BLOOD PRESSURE: 124 MMHG

## 2024-10-02 DIAGNOSIS — O09.899 SHORT INTERVAL BETWEEN PREGNANCIES AFFECTING PREGNANCY, ANTEPARTUM: ICD-10-CM

## 2024-10-02 DIAGNOSIS — Z98.891 HX OF CESAREAN SECTION: ICD-10-CM

## 2024-10-02 DIAGNOSIS — Z34.93 PRENATAL CARE IN THIRD TRIMESTER: Primary | ICD-10-CM

## 2024-10-05 ENCOUNTER — HOSPITAL ENCOUNTER (OUTPATIENT)
Facility: HOSPITAL | Age: 24
Discharge: HOME | End: 2024-10-05
Attending: OBSTETRICS & GYNECOLOGY | Admitting: OBSTETRICS & GYNECOLOGY
Payer: COMMERCIAL

## 2024-10-05 VITALS
DIASTOLIC BLOOD PRESSURE: 76 MMHG | HEART RATE: 114 BPM | TEMPERATURE: 99.4 F | RESPIRATION RATE: 16 BRPM | SYSTOLIC BLOOD PRESSURE: 130 MMHG | OXYGEN SATURATION: 98 %

## 2024-10-05 LAB
A1 MICROGLOB PLACENTAL VAG QL: NEGATIVE
BILIRUB BLD-MCNC: NEGATIVE MG/DL
CLARITY, POC: CLEAR
COLOR UR: ABNORMAL
GLUCOSE UR STRIP-MCNC: NEGATIVE MG/DL
KETONES UR QL: NEGATIVE
LEUKOCYTE EST, POC: NEGATIVE
NITRITE UR-MCNC: NEGATIVE MG/ML
PH UR: 6 [PH] (ref 5–8)
PROT UR STRIP-MCNC: ABNORMAL MG/DL
RBC # UR STRIP: NEGATIVE /UL
SP GR UR: 1.03 (ref 1–1.03)
UROBILINOGEN UR QL: NORMAL

## 2024-10-05 PROCEDURE — 59025 FETAL NON-STRESS TEST: CPT

## 2024-10-05 PROCEDURE — 84112 EVAL AMNIOTIC FLUID PROTEIN: CPT | Performed by: OBSTETRICS & GYNECOLOGY

## 2024-10-05 PROCEDURE — G0463 HOSPITAL OUTPT CLINIC VISIT: HCPCS

## 2024-10-05 PROCEDURE — 59025 FETAL NON-STRESS TEST: CPT | Performed by: OBSTETRICS & GYNECOLOGY

## 2024-10-05 PROCEDURE — 81002 URINALYSIS NONAUTO W/O SCOPE: CPT | Performed by: OBSTETRICS & GYNECOLOGY

## 2024-10-05 RX ORDER — SODIUM CHLORIDE 0.9 % (FLUSH) 0.9 %
10 SYRINGE (ML) INJECTION EVERY 12 HOURS SCHEDULED
Status: DISCONTINUED | OUTPATIENT
Start: 2024-10-05 | End: 2024-10-05 | Stop reason: HOSPADM

## 2024-10-05 RX ORDER — LIDOCAINE HYDROCHLORIDE 10 MG/ML
0.5 INJECTION, SOLUTION INFILTRATION; PERINEURAL ONCE AS NEEDED
Status: DISCONTINUED | OUTPATIENT
Start: 2024-10-05 | End: 2024-10-05 | Stop reason: HOSPADM

## 2024-10-05 RX ORDER — SODIUM CHLORIDE 0.9 % (FLUSH) 0.9 %
10 SYRINGE (ML) INJECTION AS NEEDED
Status: DISCONTINUED | OUTPATIENT
Start: 2024-10-05 | End: 2024-10-05 | Stop reason: HOSPADM

## 2024-10-06 NOTE — NON STRESS TEST
Non Stress Test     [place]    Patient: Carol Vega  : 2000  MRN: [id number]  CSN: [id number]    Gestational Age: 38w5d    Indication for NST Cramping  leaking       Time On 18:55   Time Off 19:45       Interpretation    Baseline 's beats per minute   Category 1   Decelerations Absent       Additional Comments See nursing notes       Recommendations for f/u See nursing notes       This note has been electronically signed.    Eve Ricci M.D.

## 2024-10-06 NOTE — NON STRESS TEST
"Triage Note - Nursing Documentation  Labor and Delivery Admission Log    Carol Vega  : 2000  MRN: [id number]  CSN: [id number]    Date in / Time in:  10/5/2024  Time in:     Date out / Time out:    Time out:     Nurse: Barbara Mittal RN    Patient Info: She is a 24 y.o. year old  at 38w4d with an MACI of 10/15/2024, by Ultrasound who was seen on the [facility] [place].    Chief Complaint:   Chief Complaint   Patient presents with    Leaking Fluid     \"My friend noticed the back of my pants were wet but I didn't feel my water break or felt like I peed myself\"       Provider Instructions / Disposition: Amnisure negative. Pt will be a repeat c section next Friday. Reactive NST on portable monitor.     Patient Active Problem List   Diagnosis    Severe episode of recurrent major depressive disorder, with psychotic features    Oppositional defiant disorder    MDD (major depressive disorder), severe    Cannabis abuse, continuous    PTSD (post-traumatic stress disorder)    Exposure to lead    Calculus of gallbladder without cholecystitis without obstruction    BMI 45.0-49.9, adult    Hx of  section    Short interval between pregnancies affecting pregnancy, antepartum    Abnormal genetic test during pregnancy    Morbid obesity with BMI of 40.0-44.9, adult    Pregnancy    Previous  section       NST Documentation (Only applicable > 32 weeks): Interpretation A  Nonstress Test Interpretation A: Reactive (10/05/24 2007 : Barbara Mittal, RN)    "

## 2024-10-07 ENCOUNTER — ROUTINE PRENATAL (OUTPATIENT)
Dept: OBSTETRICS AND GYNECOLOGY | Facility: CLINIC | Age: 24
End: 2024-10-07
Payer: COMMERCIAL

## 2024-10-07 VITALS — SYSTOLIC BLOOD PRESSURE: 128 MMHG | BODY MASS INDEX: 49.42 KG/M2 | WEIGHT: 279 LBS | DIASTOLIC BLOOD PRESSURE: 80 MMHG

## 2024-10-07 DIAGNOSIS — Z34.93 PRENATAL CARE IN THIRD TRIMESTER: Primary | ICD-10-CM

## 2024-10-07 DIAGNOSIS — Z98.891 HX OF CESAREAN SECTION: ICD-10-CM

## 2024-10-07 DIAGNOSIS — O09.899 SHORT INTERVAL BETWEEN PREGNANCIES AFFECTING PREGNANCY, ANTEPARTUM: ICD-10-CM

## 2024-10-07 PROCEDURE — 99213 OFFICE O/P EST LOW 20 MIN: CPT | Performed by: OBSTETRICS & GYNECOLOGY

## 2024-10-08 NOTE — PROGRESS NOTES
Prenatal Care Visit    Subjective   Chief Complaint   Patient presents with    Routine Prenatal Visit       History:   Carol is a  currently at 38w6d who presents for a prenatal care visit today.    No major issues.    Social History    Tobacco Use      Smoking status: Never        Passive exposure: Never      Smokeless tobacco: Never      Tobacco comments: vapes       Objective   /80   Wt 127 kg (279 lb)   LMP  (LMP Unknown)   BMI 49.42 kg/m²   Physical Exam:  Normal, gestational age-appropriate exam today        Plan   Medical Decision Making:    I have reviewed the prenatal labs and ultrasound(s) today. I have reviewed the most recent prenatal progress note(s).    Diagnosis: Supervision of high risk pregnancy  Previous C/S with planned repeat C/S  Short interval pregnancy   BMI 49  Anemia   Tests/Orders/Rx today: No orders of the defined types were placed in this encounter.      Medication Management: None     Topics discussed: Prenatal care milestones  kick counts and fetal movement  labor signs and symptoms  PIH precautions  C/S Friday   Wants Nexplanon placed during hospital stay   Tests next visit: None   Next visit: none     Ludwig Hernandez MD  Obstetrics and Gynecology  [facility]

## 2024-10-09 ENCOUNTER — TELEPHONE (OUTPATIENT)
Dept: OBSTETRICS AND GYNECOLOGY | Facility: CLINIC | Age: 24
End: 2024-10-09

## 2024-10-09 NOTE — TELEPHONE ENCOUNTER
Caller: Carol Vega    Relationship: Self    Best call back number: [phone]     What form or medical record are you requesting: PROOF C SECTION IS SCHEDULED FOR 10/11/24. [employer] NEEDS LETTER WITH PATIENT'S NAME, , TYPE OF DELIVERY, DATE AND TIME.     Who is requesting this form or medical record from you:  [employer] [facility]    How would you like to receive the form or medical records (pick-up, mail, fax): [employer] FAX NUMBER   If fax, what is the fax number: [phone]    Timeframe paperwork needed: BY END OF TODAY     Additional notes: OB PATIENT NEEDS LETTER FAXED TO [employer] [facility] DUE TO NO CONTACT ORDER.

## 2024-10-10 NOTE — PROGRESS NOTES
Prenatal Care Visit    Subjective   Chief Complaint   Patient presents with    Routine Prenatal Visit     No Complaints/concerns        History:   Carol is a  currently at 38w1d who presents for a prenatal care visit today.    No major issues.    Social History    Tobacco Use      Smoking status: Never        Passive exposure: Never      Smokeless tobacco: Never      Tobacco comments: vapes       Objective   /80   Wt 125 kg (276 lb)   LMP  (LMP Unknown)   BMI 48.89 kg/m²   Physical Exam:  Normal, gestational age-appropriate exam today        Plan   Medical Decision Making:    I have reviewed the prenatal labs and ultrasound(s) today. I have reviewed the most recent prenatal progress note(s).    Diagnosis: Supervision of high risk pregnancy  Previous C/S with planned repeat C/S  Short interval pregnancy   BMI 49  Anemia   Tests/Orders/Rx today: No orders of the defined types were placed in this encounter.      Medication Management: None     Topics discussed: Prenatal care milestones   section risks, benefits  kick counts and fetal movement  labor signs and symptoms  PIH precautions   Wants Nexplanon placed during hospital stay   Tests next visit: None   Next visit: 1 weeks     Ludwig Hernandez MD  Obstetrics and Gynecology  [facility]

## 2024-10-11 ENCOUNTER — ANESTHESIA (OUTPATIENT)
Dept: PERIOP | Facility: HOSPITAL | Age: 24
End: 2024-10-11
Payer: COMMERCIAL

## 2024-10-11 ENCOUNTER — ANESTHESIA EVENT (OUTPATIENT)
Dept: PERIOP | Facility: HOSPITAL | Age: 24
End: 2024-10-11
Payer: COMMERCIAL

## 2024-10-11 ENCOUNTER — HOSPITAL ENCOUNTER (INPATIENT)
Facility: HOSPITAL | Age: 24
LOS: 2 days | Discharge: HOME | End: 2024-10-13
Attending: OBSTETRICS & GYNECOLOGY | Admitting: OBSTETRICS & GYNECOLOGY
Payer: COMMERCIAL

## 2024-10-11 ENCOUNTER — ANESTHESIA EVENT CONVERTED (OUTPATIENT)
Dept: ANESTHESIOLOGY | Facility: HOSPITAL | Age: 24
End: 2024-10-11
Payer: COMMERCIAL

## 2024-10-11 DIAGNOSIS — Z98.891 PREVIOUS CESAREAN SECTION: ICD-10-CM

## 2024-10-11 DIAGNOSIS — O99.019 MATERNAL ANEMIA IN PREGNANCY, ANTEPARTUM: ICD-10-CM

## 2024-10-11 PROBLEM — E66.01 MORBID OBESITY WITH BMI OF 40.0-44.9, ADULT: Status: RESOLVED | Noted: 2024-06-11 | Resolved: 2024-10-11

## 2024-10-11 PROBLEM — Z34.90 PREGNANCY: Status: RESOLVED | Noted: 2024-09-09 | Resolved: 2024-10-11

## 2024-10-11 LAB
ABO GROUP BLD: NORMAL
AMPHET+METHAMPHET UR QL: NEGATIVE
AMPHETAMINES UR QL: NEGATIVE
BARBITURATES UR QL SCN: NEGATIVE
BASOPHILS # BLD AUTO: 0.05 10*3/MM3 (ref 0–0.2)
BASOPHILS NFR BLD AUTO: 0.3 % (ref 0–1.5)
BENZODIAZ UR QL SCN: NEGATIVE
BILIRUB BLD-MCNC: NEGATIVE MG/DL
BLD GP AB SCN SERPL QL: NEGATIVE
BUPRENORPHINE SERPL-MCNC: NEGATIVE NG/ML
CANNABINOIDS SERPL QL: NEGATIVE
CLARITY, POC: CLEAR
COCAINE UR QL: NEGATIVE
COLOR UR: YELLOW
DEPRECATED RDW RBC AUTO: 47.2 FL (ref 37–54)
EOSINOPHIL # BLD AUTO: 0.24 10*3/MM3 (ref 0–0.4)
EOSINOPHIL NFR BLD AUTO: 1.5 % (ref 0.3–6.2)
ERYTHROCYTE [DISTWIDTH] IN BLOOD BY AUTOMATED COUNT: 17.7 % (ref 12.3–15.4)
FENTANYL UR-MCNC: NEGATIVE NG/ML
GLUCOSE UR STRIP-MCNC: NEGATIVE MG/DL
HCT VFR BLD AUTO: 21.9 % (ref 34–46.6)
HCT VFR BLD AUTO: 28.7 % (ref 34–46.6)
HGB BLD-MCNC: 6.9 G/DL (ref 12–15.9)
HGB BLD-MCNC: 9.2 G/DL (ref 12–15.9)
IMM GRANULOCYTES # BLD AUTO: 0.21 10*3/MM3 (ref 0–0.05)
IMM GRANULOCYTES NFR BLD AUTO: 1.3 % (ref 0–0.5)
KETONES UR QL: NEGATIVE
LEUKOCYTE EST, POC: NEGATIVE
LYMPHOCYTES # BLD AUTO: 3.46 10*3/MM3 (ref 0.7–3.1)
LYMPHOCYTES NFR BLD AUTO: 21.1 % (ref 19.6–45.3)
MCH RBC QN AUTO: 24.1 PG (ref 26.6–33)
MCHC RBC AUTO-ENTMCNC: 32.1 G/DL (ref 31.5–35.7)
MCV RBC AUTO: 75.1 FL (ref 79–97)
METHADONE UR QL SCN: NEGATIVE
MONOCYTES # BLD AUTO: 1.14 10*3/MM3 (ref 0.1–0.9)
MONOCYTES NFR BLD AUTO: 7 % (ref 5–12)
NEUTROPHILS NFR BLD AUTO: 11.28 10*3/MM3 (ref 1.7–7)
NEUTROPHILS NFR BLD AUTO: 68.8 % (ref 42.7–76)
NITRITE UR-MCNC: NEGATIVE MG/ML
NRBC BLD AUTO-RTO: 0 /100 WBC (ref 0–0.2)
OPIATES UR QL: NEGATIVE
OXYCODONE UR QL SCN: NEGATIVE
PCP UR QL SCN: NEGATIVE
PH UR: 6.5 [PH] (ref 5–8)
PLATELET # BLD AUTO: 258 10*3/MM3 (ref 140–450)
PMV BLD AUTO: 11.7 FL (ref 6–12)
PROT UR STRIP-MCNC: NEGATIVE MG/DL
RBC # BLD AUTO: 3.82 10*6/MM3 (ref 3.77–5.28)
RBC # UR STRIP: NEGATIVE /UL
RH BLD: POSITIVE
SP GR UR: 1.01 (ref 1–1.03)
T&S EXPIRATION DATE: NORMAL
TREPONEMA PALLIDUM IGG+IGM AB [PRESENCE] IN SERUM OR PLASMA BY IMMUNOASSAY: NORMAL
TRICYCLICS UR QL SCN: NEGATIVE
UROBILINOGEN UR QL: NORMAL
WBC NRBC COR # BLD AUTO: 16.38 10*3/MM3 (ref 3.4–10.8)

## 2024-10-11 PROCEDURE — 81002 URINALYSIS NONAUTO W/O SCOPE: CPT | Performed by: OBSTETRICS & GYNECOLOGY

## 2024-10-11 PROCEDURE — 25010000002 BUPIVACAINE (PF) 0.25 % SOLUTION: Performed by: NURSE ANESTHETIST, CERTIFIED REGISTERED

## 2024-10-11 PROCEDURE — 25010000002 SUCCINYLCHOLINE PER 20 MG: Performed by: NURSE ANESTHETIST, CERTIFIED REGISTERED

## 2024-10-11 PROCEDURE — 25810000003 LACTATED RINGERS SOLUTION: Performed by: STUDENT IN AN ORGANIZED HEALTH CARE EDUCATION/TRAINING PROGRAM

## 2024-10-11 PROCEDURE — 25010000002 HYDROMORPHONE 1 MG/ML SOLUTION: Performed by: NURSE ANESTHETIST, CERTIFIED REGISTERED

## 2024-10-11 PROCEDURE — 59515 CESAREAN DELIVERY: CPT | Performed by: OBSTETRICS & GYNECOLOGY

## 2024-10-11 PROCEDURE — 80307 DRUG TEST PRSMV CHEM ANLYZR: CPT | Performed by: OBSTETRICS & GYNECOLOGY

## 2024-10-11 PROCEDURE — 25010000002 FENTANYL CITRATE (PF) 50 MCG/ML SOLUTION: Performed by: NURSE ANESTHETIST, CERTIFIED REGISTERED

## 2024-10-11 PROCEDURE — 25810000003 LACTATED RINGERS PER 1000 ML: Performed by: STUDENT IN AN ORGANIZED HEALTH CARE EDUCATION/TRAINING PROGRAM

## 2024-10-11 PROCEDURE — 25010000002 ONDANSETRON PER 1 MG: Performed by: NURSE ANESTHETIST, CERTIFIED REGISTERED

## 2024-10-11 PROCEDURE — 85014 HEMATOCRIT: CPT | Performed by: OBSTETRICS & GYNECOLOGY

## 2024-10-11 PROCEDURE — 85018 HEMOGLOBIN: CPT | Performed by: OBSTETRICS & GYNECOLOGY

## 2024-10-11 PROCEDURE — 25010000002 MORPHINE PER 10 MG: Performed by: NURSE ANESTHETIST, CERTIFIED REGISTERED

## 2024-10-11 PROCEDURE — S0260 H&P FOR SURGERY: HCPCS | Performed by: OBSTETRICS & GYNECOLOGY

## 2024-10-11 PROCEDURE — 86780 TREPONEMA PALLIDUM: CPT | Performed by: STUDENT IN AN ORGANIZED HEALTH CARE EDUCATION/TRAINING PROGRAM

## 2024-10-11 PROCEDURE — 86920 COMPATIBILITY TEST SPIN: CPT

## 2024-10-11 PROCEDURE — 86850 RBC ANTIBODY SCREEN: CPT | Performed by: STUDENT IN AN ORGANIZED HEALTH CARE EDUCATION/TRAINING PROGRAM

## 2024-10-11 PROCEDURE — 25010000002 CEFAZOLIN PER 500 MG: Performed by: NURSE ANESTHETIST, CERTIFIED REGISTERED

## 2024-10-11 PROCEDURE — 36430 TRANSFUSION BLD/BLD COMPNT: CPT

## 2024-10-11 PROCEDURE — 25010000002 OXYTOCIN PER 10 UNITS: Performed by: NURSE ANESTHETIST, CERTIFIED REGISTERED

## 2024-10-11 PROCEDURE — 86900 BLOOD TYPING SEROLOGIC ABO: CPT

## 2024-10-11 PROCEDURE — 86900 BLOOD TYPING SEROLOGIC ABO: CPT | Performed by: STUDENT IN AN ORGANIZED HEALTH CARE EDUCATION/TRAINING PROGRAM

## 2024-10-11 PROCEDURE — 25010000002 CEFAZOLIN 3 G RECONSTITUTED SOLUTION 1 EACH VIAL: Performed by: STUDENT IN AN ORGANIZED HEALTH CARE EDUCATION/TRAINING PROGRAM

## 2024-10-11 PROCEDURE — 85025 COMPLETE CBC W/AUTO DIFF WBC: CPT | Performed by: STUDENT IN AN ORGANIZED HEALTH CARE EDUCATION/TRAINING PROGRAM

## 2024-10-11 PROCEDURE — 25010000002 PROPOFOL 10 MG/ML EMULSION: Performed by: NURSE ANESTHETIST, CERTIFIED REGISTERED

## 2024-10-11 PROCEDURE — P9016 RBC LEUKOCYTES REDUCED: HCPCS

## 2024-10-11 PROCEDURE — 25010000002 SUGAMMADEX 200 MG/2ML SOLUTION: Performed by: NURSE ANESTHETIST, CERTIFIED REGISTERED

## 2024-10-11 PROCEDURE — 86901 BLOOD TYPING SEROLOGIC RH(D): CPT | Performed by: STUDENT IN AN ORGANIZED HEALTH CARE EDUCATION/TRAINING PROGRAM

## 2024-10-11 PROCEDURE — 59025 FETAL NON-STRESS TEST: CPT | Performed by: OBSTETRICS & GYNECOLOGY

## 2024-10-11 DEVICE — ABSORBABLE HEMOSTAT (OXIDIZED REGENERATED CELLULOSE)
Type: IMPLANTABLE DEVICE | Site: UTERUS | Status: FUNCTIONAL
Brand: SURGICEL

## 2024-10-11 RX ORDER — SODIUM CHLORIDE, SODIUM LACTATE, POTASSIUM CHLORIDE, CALCIUM CHLORIDE 600; 310; 30; 20 MG/100ML; MG/100ML; MG/100ML; MG/100ML
125 INJECTION, SOLUTION INTRAVENOUS CONTINUOUS
Status: DISCONTINUED | OUTPATIENT
Start: 2024-10-11 | End: 2024-10-11

## 2024-10-11 RX ORDER — PROMETHAZINE HYDROCHLORIDE 25 MG/1
25 TABLET ORAL EVERY 6 HOURS PRN
Status: DISCONTINUED | OUTPATIENT
Start: 2024-10-11 | End: 2024-10-13 | Stop reason: HOSPADM

## 2024-10-11 RX ORDER — NICOTINE 21 MG/24HR
1 PATCH, TRANSDERMAL 24 HOURS TRANSDERMAL EVERY 24 HOURS
Status: DISCONTINUED | OUTPATIENT
Start: 2024-10-11 | End: 2024-10-11

## 2024-10-11 RX ORDER — SEVOFLURANE 250 ML/250ML
LIQUID RESPIRATORY (INHALATION) ONCE
Status: COMPLETED | OUTPATIENT
Start: 2024-10-11 | End: 2024-10-11

## 2024-10-11 RX ORDER — HYDROXYZINE HYDROCHLORIDE 25 MG/1
50 TABLET, FILM COATED ORAL EVERY 6 HOURS PRN
Status: DISCONTINUED | OUTPATIENT
Start: 2024-10-11 | End: 2024-10-13 | Stop reason: HOSPADM

## 2024-10-11 RX ORDER — ENOXAPARIN SODIUM 100 MG/ML
40 INJECTION SUBCUTANEOUS EVERY 12 HOURS
Status: DISCONTINUED | OUTPATIENT
Start: 2024-10-12 | End: 2024-10-13 | Stop reason: HOSPADM

## 2024-10-11 RX ORDER — KETOROLAC TROMETHAMINE 30 MG/ML
30 INJECTION, SOLUTION INTRAMUSCULAR; INTRAVENOUS ONCE
Status: DISCONTINUED | OUTPATIENT
Start: 2024-10-11 | End: 2024-10-13 | Stop reason: HOSPADM

## 2024-10-11 RX ORDER — CARBOPROST TROMETHAMINE 250 UG/ML
250 INJECTION, SOLUTION INTRAMUSCULAR AS NEEDED
Status: DISCONTINUED | OUTPATIENT
Start: 2024-10-11 | End: 2024-10-13 | Stop reason: HOSPADM

## 2024-10-11 RX ORDER — ONDANSETRON 4 MG/1
4 TABLET, ORALLY DISINTEGRATING ORAL EVERY 8 HOURS PRN
Status: DISCONTINUED | OUTPATIENT
Start: 2024-10-11 | End: 2024-10-13 | Stop reason: HOSPADM

## 2024-10-11 RX ORDER — PROMETHAZINE HYDROCHLORIDE 12.5 MG/1
12.5 SUPPOSITORY RECTAL EVERY 6 HOURS PRN
Status: DISCONTINUED | OUTPATIENT
Start: 2024-10-11 | End: 2024-10-13 | Stop reason: HOSPADM

## 2024-10-11 RX ORDER — ONDANSETRON 4 MG/1
4 TABLET, ORALLY DISINTEGRATING ORAL EVERY 6 HOURS PRN
Status: DISCONTINUED | OUTPATIENT
Start: 2024-10-11 | End: 2024-10-11 | Stop reason: SDUPTHER

## 2024-10-11 RX ORDER — FAMOTIDINE 20 MG/1
20 TABLET, FILM COATED ORAL ONCE AS NEEDED
Status: DISCONTINUED | OUTPATIENT
Start: 2024-10-11 | End: 2024-10-13 | Stop reason: HOSPADM

## 2024-10-11 RX ORDER — FAMOTIDINE 10 MG/ML
20 INJECTION, SOLUTION INTRAVENOUS ONCE
Status: COMPLETED | OUTPATIENT
Start: 2024-10-11 | End: 2024-10-11

## 2024-10-11 RX ORDER — SODIUM CHLORIDE 0.9 % (FLUSH) 0.9 %
10 SYRINGE (ML) INJECTION EVERY 12 HOURS SCHEDULED
Status: DISCONTINUED | OUTPATIENT
Start: 2024-10-11 | End: 2024-10-11 | Stop reason: HOSPADM

## 2024-10-11 RX ORDER — LORAZEPAM 2 MG/ML
1 INJECTION INTRAMUSCULAR ONCE AS NEEDED
Status: DISCONTINUED | OUTPATIENT
Start: 2024-10-11 | End: 2024-10-11 | Stop reason: HOSPADM

## 2024-10-11 RX ORDER — SEVOFLURANE 250 ML/250ML
LIQUID RESPIRATORY (INHALATION) ONCE
Status: CANCELLED | OUTPATIENT
Start: 2024-10-11

## 2024-10-11 RX ORDER — CEFAZOLIN SODIUM 1 G/3ML
INJECTION, POWDER, FOR SOLUTION INTRAMUSCULAR; INTRAVENOUS AS NEEDED
Status: DISCONTINUED | OUTPATIENT
Start: 2024-10-11 | End: 2024-10-11 | Stop reason: SURG

## 2024-10-11 RX ORDER — TRANEXAMIC ACID 10 MG/ML
1000 INJECTION, SOLUTION INTRAVENOUS ONCE AS NEEDED
Status: DISCONTINUED | OUTPATIENT
Start: 2024-10-11 | End: 2024-10-13 | Stop reason: HOSPADM

## 2024-10-11 RX ORDER — ACETAMINOPHEN 500 MG
1000 TABLET ORAL ONCE
Status: COMPLETED | OUTPATIENT
Start: 2024-10-11 | End: 2024-10-11

## 2024-10-11 RX ORDER — MORPHINE SULFATE 2 MG/ML
2 INJECTION, SOLUTION INTRAMUSCULAR; INTRAVENOUS
Status: DISCONTINUED | OUTPATIENT
Start: 2024-10-11 | End: 2024-10-11 | Stop reason: HOSPADM

## 2024-10-11 RX ORDER — MISOPROSTOL 200 UG/1
800 TABLET ORAL AS NEEDED
Status: DISCONTINUED | OUTPATIENT
Start: 2024-10-11 | End: 2024-10-13 | Stop reason: HOSPADM

## 2024-10-11 RX ORDER — MEPERIDINE HYDROCHLORIDE 25 MG/ML
25 INJECTION INTRAMUSCULAR; INTRAVENOUS; SUBCUTANEOUS ONCE AS NEEDED
Status: DISCONTINUED | OUTPATIENT
Start: 2024-10-11 | End: 2024-10-11 | Stop reason: HOSPADM

## 2024-10-11 RX ORDER — SEVOFLURANE 250 ML/250ML
LIQUID RESPIRATORY (INHALATION) CONTINUOUS
Status: CANCELLED | OUTPATIENT
Start: 2024-10-11

## 2024-10-11 RX ORDER — FENTANYL CITRATE 50 UG/ML
INJECTION, SOLUTION INTRAMUSCULAR; INTRAVENOUS AS NEEDED
Status: DISCONTINUED | OUTPATIENT
Start: 2024-10-11 | End: 2024-10-11 | Stop reason: SURG

## 2024-10-11 RX ORDER — BUPIVACAINE HYDROCHLORIDE 2.5 MG/ML
INJECTION, SOLUTION EPIDURAL; INFILTRATION; INTRACAUDAL
Status: COMPLETED | OUTPATIENT
Start: 2024-10-11 | End: 2024-10-11

## 2024-10-11 RX ORDER — OXYTOCIN/0.9 % SODIUM CHLORIDE 30/500 ML
125 PLASTIC BAG, INJECTION (ML) INTRAVENOUS ONCE AS NEEDED
Status: DISCONTINUED | OUTPATIENT
Start: 2024-10-11 | End: 2024-10-13 | Stop reason: HOSPADM

## 2024-10-11 RX ORDER — SODIUM CHLORIDE 0.9 % (FLUSH) 0.9 %
10 SYRINGE (ML) INJECTION AS NEEDED
Status: DISCONTINUED | OUTPATIENT
Start: 2024-10-11 | End: 2024-10-11 | Stop reason: HOSPADM

## 2024-10-11 RX ORDER — ONDANSETRON 2 MG/ML
4 INJECTION INTRAMUSCULAR; INTRAVENOUS ONCE AS NEEDED
Status: DISCONTINUED | OUTPATIENT
Start: 2024-10-11 | End: 2024-10-11 | Stop reason: HOSPADM

## 2024-10-11 RX ORDER — IBUPROFEN 600 MG/1
600 TABLET, FILM COATED ORAL EVERY 6 HOURS SCHEDULED
Status: DISCONTINUED | OUTPATIENT
Start: 2024-10-11 | End: 2024-10-11 | Stop reason: ALTCHOICE

## 2024-10-11 RX ORDER — MORPHINE SULFATE 2 MG/ML
2 INJECTION, SOLUTION INTRAMUSCULAR; INTRAVENOUS
Status: DISCONTINUED | OUTPATIENT
Start: 2024-10-11 | End: 2024-10-13 | Stop reason: HOSPADM

## 2024-10-11 RX ORDER — OXYTOCIN/0.9 % SODIUM CHLORIDE 30/500 ML
333 PLASTIC BAG, INJECTION (ML) INTRAVENOUS ONCE
Status: DISCONTINUED | OUTPATIENT
Start: 2024-10-11 | End: 2024-10-13 | Stop reason: HOSPADM

## 2024-10-11 RX ORDER — ACETAMINOPHEN 500 MG
1000 TABLET ORAL EVERY 8 HOURS
Status: DISCONTINUED | OUTPATIENT
Start: 2024-10-11 | End: 2024-10-13 | Stop reason: HOSPADM

## 2024-10-11 RX ORDER — LIDOCAINE HCL/PF 100 MG/5ML
SYRINGE (ML) INJECTION AS NEEDED
Status: DISCONTINUED | OUTPATIENT
Start: 2024-10-11 | End: 2024-10-11 | Stop reason: SURG

## 2024-10-11 RX ORDER — ONDANSETRON 2 MG/ML
4 INJECTION INTRAMUSCULAR; INTRAVENOUS EVERY 6 HOURS PRN
Status: DISCONTINUED | OUTPATIENT
Start: 2024-10-11 | End: 2024-10-11 | Stop reason: SDUPTHER

## 2024-10-11 RX ORDER — DIPHENHYDRAMINE HCL 25 MG
25 CAPSULE ORAL NIGHTLY PRN
Status: DISCONTINUED | OUTPATIENT
Start: 2024-10-11 | End: 2024-10-13 | Stop reason: HOSPADM

## 2024-10-11 RX ORDER — SEVOFLURANE 250 ML/250ML
LIQUID RESPIRATORY (INHALATION)
Status: DISCONTINUED | OUTPATIENT
Start: 2024-10-11 | End: 2024-10-11

## 2024-10-11 RX ORDER — METHYLERGONOVINE MALEATE 0.2 MG/ML
200 INJECTION INTRAVENOUS ONCE AS NEEDED
Status: DISCONTINUED | OUTPATIENT
Start: 2024-10-11 | End: 2024-10-13 | Stop reason: HOSPADM

## 2024-10-11 RX ORDER — ONDANSETRON 2 MG/ML
4 INJECTION INTRAMUSCULAR; INTRAVENOUS EVERY 6 HOURS PRN
Status: DISCONTINUED | OUTPATIENT
Start: 2024-10-11 | End: 2024-10-13 | Stop reason: HOSPADM

## 2024-10-11 RX ORDER — OXYCODONE HYDROCHLORIDE 5 MG/1
10 TABLET ORAL EVERY 4 HOURS PRN
Status: DISCONTINUED | OUTPATIENT
Start: 2024-10-11 | End: 2024-10-13 | Stop reason: HOSPADM

## 2024-10-11 RX ORDER — OXYCODONE HYDROCHLORIDE 5 MG/1
5 TABLET ORAL EVERY 4 HOURS PRN
Status: DISCONTINUED | OUTPATIENT
Start: 2024-10-11 | End: 2024-10-13 | Stop reason: HOSPADM

## 2024-10-11 RX ORDER — CITRIC ACID/SODIUM CITRATE 334-500MG
30 SOLUTION, ORAL ORAL ONCE
Status: COMPLETED | OUTPATIENT
Start: 2024-10-11 | End: 2024-10-11

## 2024-10-11 RX ORDER — HYDROCORTISONE 25 MG/G
CREAM TOPICAL 3 TIMES DAILY PRN
Status: DISCONTINUED | OUTPATIENT
Start: 2024-10-11 | End: 2024-10-13 | Stop reason: HOSPADM

## 2024-10-11 RX ORDER — OXYTOCIN 10 [USP'U]/ML
INJECTION, SOLUTION INTRAMUSCULAR; INTRAVENOUS AS NEEDED
Status: DISCONTINUED | OUTPATIENT
Start: 2024-10-11 | End: 2024-10-11 | Stop reason: SURG

## 2024-10-11 RX ORDER — SEVOFLURANE 250 ML/250ML
LIQUID RESPIRATORY (INHALATION) CONTINUOUS
Status: DISCONTINUED | OUTPATIENT
Start: 2024-10-11 | End: 2024-10-11

## 2024-10-11 RX ORDER — MORPHINE SULFATE 1 MG/ML
INJECTION, SOLUTION EPIDURAL; INTRATHECAL; INTRAVENOUS AS NEEDED
Status: DISCONTINUED | OUTPATIENT
Start: 2024-10-11 | End: 2024-10-11 | Stop reason: SURG

## 2024-10-11 RX ORDER — PROPOFOL 10 MG/ML
VIAL (ML) INTRAVENOUS AS NEEDED
Status: DISCONTINUED | OUTPATIENT
Start: 2024-10-11 | End: 2024-10-11 | Stop reason: SURG

## 2024-10-11 RX ORDER — PRENATAL VIT/IRON FUM/FOLIC AC 27MG-0.8MG
1 TABLET ORAL DAILY
Status: DISCONTINUED | OUTPATIENT
Start: 2024-10-12 | End: 2024-10-13 | Stop reason: HOSPADM

## 2024-10-11 RX ORDER — DIPHENHYDRAMINE HYDROCHLORIDE 50 MG/ML
25 INJECTION INTRAMUSCULAR; INTRAVENOUS NIGHTLY PRN
Status: DISCONTINUED | OUTPATIENT
Start: 2024-10-11 | End: 2024-10-13 | Stop reason: HOSPADM

## 2024-10-11 RX ORDER — SUCCINYLCHOLINE CHLORIDE 20 MG/ML
INJECTION INTRAMUSCULAR; INTRAVENOUS AS NEEDED
Status: DISCONTINUED | OUTPATIENT
Start: 2024-10-11 | End: 2024-10-11 | Stop reason: SURG

## 2024-10-11 RX ORDER — FAMOTIDINE 10 MG/ML
20 INJECTION, SOLUTION INTRAVENOUS ONCE AS NEEDED
Status: DISCONTINUED | OUTPATIENT
Start: 2024-10-11 | End: 2024-10-13 | Stop reason: HOSPADM

## 2024-10-11 RX ORDER — ONDANSETRON 2 MG/ML
INJECTION INTRAMUSCULAR; INTRAVENOUS AS NEEDED
Status: DISCONTINUED | OUTPATIENT
Start: 2024-10-11 | End: 2024-10-11 | Stop reason: SURG

## 2024-10-11 RX ORDER — OXYTOCIN/0.9 % SODIUM CHLORIDE 30/500 ML
42 PLASTIC BAG, INJECTION (ML) INTRAVENOUS AS NEEDED
Status: ACTIVE | OUTPATIENT
Start: 2024-10-11 | End: 2024-10-12

## 2024-10-11 RX ORDER — ROCURONIUM BROMIDE 50 MG/5 ML
SYRINGE (ML) INTRAVENOUS AS NEEDED
Status: DISCONTINUED | OUTPATIENT
Start: 2024-10-11 | End: 2024-10-11 | Stop reason: SURG

## 2024-10-11 RX ORDER — ACETAMINOPHEN 325 MG/1
650 TABLET ORAL EVERY 6 HOURS
Status: DISCONTINUED | OUTPATIENT
Start: 2024-10-11 | End: 2024-10-11 | Stop reason: ALTCHOICE

## 2024-10-11 RX ORDER — PROMETHAZINE HYDROCHLORIDE 12.5 MG/1
12.5 TABLET ORAL EVERY 6 HOURS PRN
Status: DISCONTINUED | OUTPATIENT
Start: 2024-10-11 | End: 2024-10-11 | Stop reason: SDUPTHER

## 2024-10-11 RX ORDER — LIDOCAINE HYDROCHLORIDE 10 MG/ML
0.5 INJECTION, SOLUTION INFILTRATION; PERINEURAL ONCE AS NEEDED
Status: DISCONTINUED | OUTPATIENT
Start: 2024-10-11 | End: 2024-10-11 | Stop reason: HOSPADM

## 2024-10-11 RX ORDER — SODIUM CHLORIDE 9 MG/ML
40 INJECTION, SOLUTION INTRAVENOUS AS NEEDED
Status: DISCONTINUED | OUTPATIENT
Start: 2024-10-11 | End: 2024-10-11 | Stop reason: HOSPADM

## 2024-10-11 RX ORDER — IBUPROFEN 800 MG/1
800 TABLET, FILM COATED ORAL EVERY 8 HOURS
Status: DISCONTINUED | OUTPATIENT
Start: 2024-10-11 | End: 2024-10-13 | Stop reason: HOSPADM

## 2024-10-11 RX ORDER — SIMETHICONE 80 MG
80 TABLET,CHEWABLE ORAL 4 TIMES DAILY PRN
Status: DISCONTINUED | OUTPATIENT
Start: 2024-10-11 | End: 2024-10-13 | Stop reason: HOSPADM

## 2024-10-11 RX ADMIN — SODIUM CITRATE AND CITRIC ACID MONOHYDRATE 30 ML: 500; 334 SOLUTION ORAL at 06:12

## 2024-10-11 RX ADMIN — IBUPROFEN 800 MG: 800 TABLET, FILM COATED ORAL at 16:27

## 2024-10-11 RX ADMIN — SODIUM CHLORIDE, POTASSIUM CHLORIDE, SODIUM LACTATE AND CALCIUM CHLORIDE 2000 ML: 600; 310; 30; 20 INJECTION, SOLUTION INTRAVENOUS at 06:49

## 2024-10-11 RX ADMIN — FAMOTIDINE 20 MG: 10 INJECTION INTRAVENOUS at 06:12

## 2024-10-11 RX ADMIN — HYDROMORPHONE HYDROCHLORIDE 0.5 MG: 1 INJECTION, SOLUTION INTRAMUSCULAR; INTRAVENOUS; SUBCUTANEOUS at 09:32

## 2024-10-11 RX ADMIN — PROPOFOL 200 MG: 10 INJECTION, EMULSION INTRAVENOUS at 07:58

## 2024-10-11 RX ADMIN — OXYCODONE 5 MG: 5 TABLET ORAL at 16:27

## 2024-10-11 RX ADMIN — SODIUM CHLORIDE 3 G: 9 INJECTION, SOLUTION INTRAVENOUS at 06:12

## 2024-10-11 RX ADMIN — SODIUM CHLORIDE, POTASSIUM CHLORIDE, SODIUM LACTATE AND CALCIUM CHLORIDE 1000 ML: 600; 310; 30; 20 INJECTION, SOLUTION INTRAVENOUS at 07:15

## 2024-10-11 RX ADMIN — ACETAMINOPHEN 1000 MG: 500 TABLET, FILM COATED ORAL at 13:48

## 2024-10-11 RX ADMIN — SUGAMMADEX 200 MG: 100 INJECTION, SOLUTION INTRAVENOUS at 09:07

## 2024-10-11 RX ADMIN — CEFAZOLIN 3 G: 1 INJECTION, POWDER, FOR SOLUTION INTRAMUSCULAR; INTRAVENOUS at 07:58

## 2024-10-11 RX ADMIN — OXYTOCIN 20 UNITS: 10 INJECTION, SOLUTION INTRAMUSCULAR; INTRAVENOUS at 08:08

## 2024-10-11 RX ADMIN — MORPHINE SULFATE 5 MG: 1 INJECTION EPIDURAL; INTRATHECAL; INTRAVENOUS at 08:43

## 2024-10-11 RX ADMIN — SEVOFLURANE 2.5 MCG: 250 LIQUID RESPIRATORY (INHALATION) at 07:58

## 2024-10-11 RX ADMIN — ACETAMINOPHEN 1000 MG: 500 TABLET, FILM COATED ORAL at 06:12

## 2024-10-11 RX ADMIN — SUCCINYLCHOLINE CHLORIDE 200 MG: 20 INJECTION, SOLUTION INTRAMUSCULAR; INTRAVENOUS at 07:58

## 2024-10-11 RX ADMIN — SEVOFLURANE 2.5 MCG: 250 LIQUID RESPIRATORY (INHALATION) at 08:43

## 2024-10-11 RX ADMIN — OXYTOCIN 20 UNITS: 10 INJECTION, SOLUTION INTRAMUSCULAR; INTRAVENOUS at 08:39

## 2024-10-11 RX ADMIN — Medication 50 MG: at 08:10

## 2024-10-11 RX ADMIN — SODIUM CHLORIDE, POTASSIUM CHLORIDE, SODIUM LACTATE AND CALCIUM CHLORIDE 125 ML/HR: 600; 310; 30; 20 INJECTION, SOLUTION INTRAVENOUS at 05:56

## 2024-10-11 RX ADMIN — MORPHINE SULFATE 5 MG: 1 INJECTION EPIDURAL; INTRATHECAL; INTRAVENOUS at 09:10

## 2024-10-11 RX ADMIN — FENTANYL CITRATE 50 MCG: 50 INJECTION, SOLUTION INTRAMUSCULAR; INTRAVENOUS at 08:09

## 2024-10-11 RX ADMIN — OXYCODONE 10 MG: 5 TABLET ORAL at 20:21

## 2024-10-11 RX ADMIN — SEVOFLURANE 2.5 MCG: 250 LIQUID RESPIRATORY (INHALATION) at 08:22

## 2024-10-11 RX ADMIN — ACETAMINOPHEN 1000 MG: 500 TABLET, FILM COATED ORAL at 20:22

## 2024-10-11 RX ADMIN — Medication 50 MG: at 07:58

## 2024-10-11 RX ADMIN — BUPIVACAINE HYDROCHLORIDE 60 ML: 2.5 INJECTION, SOLUTION EPIDURAL; INFILTRATION; INTRACAUDAL; PERINEURAL at 09:09

## 2024-10-11 RX ADMIN — ONDANSETRON 4 MG: 2 INJECTION INTRAMUSCULAR; INTRAVENOUS at 07:27

## 2024-10-11 NOTE — H&P
OBSTETRICS HISTORY AND PHYSICAL    CHIEF COMPLAINT: Scheduled C/S    DIAGNOSIS:  IUP at 39w3d  Previous C/S with planned repeat C/S  Short interval pregnancy   BMI 50  Anemia    ASSESSMENT/PLAN     24 y.o.  at 39w3d who presents for scheduled C/S     #  section  - Risks for the procedure were reviewed  - Hgb 9.2 --> 1u PRBC on hold  - Placenta posterior    # Fetal Wellbeing   - Fetal tracing: Cat 1, reactive  - Ultrasound: reviewed   - Group B Streptococcus: positive   - Presentation: cephalic confirmed by recent U/S    # Routine Obstetrics  - Labs reviewed  - MBT: O +    HISTORY OF PRESENT ILLNESS     24 y.o.  at 39w3d who presents for scheduled C/S     OB Review of Systems:  Fetal movement: active  Vaginal bleeding: no  Loss of fluid: no  Contractions: no    Preeclampsia Symptoms Review:  Headaches: no  Vision changes:no  Chest pain and/or shortness of breath: no  RUQ pain: no    REVIEW OF SYSTEMS: A complete review of systems was performed and was specifically negative for headache, changes in vision, RUQ pain, shortness of breath, chest pain, lower extremity edema and dysuria.     HISTORY:  Obstetrical History:  OB History    Para Term  AB Living   4 1 1   2 1   SAB IAB Ectopic Molar Multiple Live Births   2       0 1      # Outcome Date GA Lbr Dangelo/2nd Weight Sex Type Anes PTL Lv   4 Current            3 SAB 2024           2 Term 23 39w2d  3400 g (7 lb 7.9 oz) F CS-LTranv EPI N MARCIA      Complications: Failure to Progress in First Stage   1 2021 5w0d    SAB         Obstetric Comments   Fob #1 - Pregnancy #1 - #4        Past Medical History:  Past Medical History:   Diagnosis Date    Anxiety     Endometriosis     Hx of Chlamydia     with last Pregnancy    Hx of Gonorrhea     age 18    Hx of Kidney stone     Hx of Seizures     trauma induced years ago/ last one was 2-3 years ago - no medications    Major depression     Migraine     PMS (premenstrual syndrome)      PTSD (post-traumatic stress disorder)     Self-injurious behavior     hx of cutting starting at age 15    Suicide attempt     3/25/19-overdose attempt, 2017 overdose attempt    Urinary tract infection        Past Surgical History:  Past Surgical History:   Procedure Laterality Date     SECTION N/A 2023    Procedure:  SECTION PRIMARY;  Surgeon: Ludwig Hernandez MD;  Location: [facility];  Service: Obstetrics/Gynecology;  Laterality: N/A;    CHOLECYSTECTOMY N/A 10/16/2023    Procedure: CHOLECYSTECTOMY LAPAROSCOPIC;  Surgeon: Noemy Tsang MD;  Location: [facility];  Service: General;  Laterality: N/A;    ESOPHAGEAL DILATATION      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    TONSILLECTOMY         Social History:  Social History     Tobacco Use    Smoking status: Never     Passive exposure: Never    Smokeless tobacco: Never    Tobacco comments:     vapes   Vaping Use    Vaping status: Every Day    Substances: Nicotine, Flavoring    Devices: Disposable   Substance Use Topics    Alcohol use: Never    Drug use: Not Currently     Types: Marijuana     Comment: pt reports marjuana x 1 during pregnancy       Family History  Family History   Problem Relation Age of Onset    Rheum arthritis Mother     Depression Mother     Drug abuse Mother     Heart murmur Father     Drug abuse Father     Alcohol abuse Father           OBJECTIVE   VITALS:  Temp:  [98.3 °F (36.8 °C)] 98.3 °F (36.8 °C)  Heart Rate:  [105] 105  Resp:  [16] 16  BP: (104)/(67) 104/67    PHYSICAL EXAM:  GENERAL: NAD, alert  CHEST: No increased work of breathing, CTAB  CV: RRR, WWP  ABDOMEN: Gravid, nontender  EXTREMITIES:  Warm and well-perfused, nontender, nonedematous  NEURO: AAO x 3, CN II-XII grossly intact    Fetal Monitoring:  Cat 1, reactive     Allergies:   Allergies   Allergen Reactions    Iodine Hives    Latex Hives       No current facility-administered medications on file prior to encounter.     Current Outpatient  Medications on File Prior to Encounter   Medication Sig Dispense Refill    ferrous sulfate 325 (65 FE) MG tablet Take 1 tablet by mouth 2 (Two) Times a Day. 60 tablet 4    Prenatal Vit-Fe Fumarate-FA (PRENATAL VITAMINS PO) Take  by mouth.         DIAGNOSTIC STUDIES:  Results from last 7 days   Lab Units 10/11/24  0559   WBC 10*3/mm3 16.38*   HEMOGLOBIN g/dL 9.2*   HEMATOCRIT % 28.7*   PLATELETS 10*3/mm3 258       Recent Results (from the past 24 hour(s))   POC Urinalysis Dipstick    Collection Time: 10/11/24  5:15 AM    Specimen: Urine   Result Value Ref Range    Color Yellow Yellow, Straw, Dark Yellow, Kristen    Clarity, UA Clear Clear    Glucose, UA Negative Negative mg/dL    Bilirubin Negative Negative    Ketones, UA Negative Negative    Specific Gravity  1.010 1.005 - 1.030    Blood, UA Negative Negative    pH, Urine 6.5 5.0 - 8.0    Protein, POC Negative Negative mg/dL    Urobilinogen, UA Normal Normal, 0.2 E.U./dL    Leukocytes Negative Negative    Nitrite, UA Negative Negative   CBC Auto Differential    Collection Time: 10/11/24  5:59 AM    Specimen: Blood   Result Value Ref Range    WBC 16.38 (H) 3.40 - 10.80 10*3/mm3    RBC 3.82 3.77 - 5.28 10*6/mm3    Hemoglobin 9.2 (L) 12.0 - 15.9 g/dL    Hematocrit 28.7 (L) 34.0 - 46.6 %    MCV 75.1 (L) 79.0 - 97.0 fL    MCH 24.1 (L) 26.6 - 33.0 pg    MCHC 32.1 31.5 - 35.7 g/dL    RDW 17.7 (H) 12.3 - 15.4 %    RDW-SD 47.2 37.0 - 54.0 fl    MPV 11.7 6.0 - 12.0 fL    Platelets 258 140 - 450 10*3/mm3    Neutrophil % 68.8 42.7 - 76.0 %    Lymphocyte % 21.1 19.6 - 45.3 %    Monocyte % 7.0 5.0 - 12.0 %    Eosinophil % 1.5 0.3 - 6.2 %    Basophil % 0.3 0.0 - 1.5 %    Immature Grans % 1.3 (H) 0.0 - 0.5 %    Neutrophils, Absolute 11.28 (H) 1.70 - 7.00 10*3/mm3    Lymphocytes, Absolute 3.46 (H) 0.70 - 3.10 10*3/mm3    Monocytes, Absolute 1.14 (H) 0.10 - 0.90 10*3/mm3    Eosinophils, Absolute 0.24 0.00 - 0.40 10*3/mm3    Basophils, Absolute 0.05 0.00 - 0.20 10*3/mm3    Immature  Grans, Absolute 0.21 (H) 0.00 - 0.05 10*3/mm3    nRBC 0.0 0.0 - 0.2 /100 WBC       Ludwig Hernandez MD  Obstetrics and Gynecology  [facility]

## 2024-10-11 NOTE — ANESTHESIA POSTPROCEDURE EVALUATION
Patient: Carol Vega    Procedure Summary       Date: 10/11/24 Room / Location: [facility] OR  /  [facility]    Anesthesia Start: 727 Anesthesia Stop: 918    Procedure:  SECTION REPEAT (Abdomen) Diagnosis:       Previous  section      (Previous  section [Z98.891])    Surgeons: Ludwig Hernandez MD Provider: Kailash Mittal CRNA    Anesthesia Type: spinal ASA Status: 3            Anesthesia Type: spinal    Vitals  Vitals Value Taken Time   BP     Temp     Pulse 103 10/11/24 0918   Resp     SpO2 84 % 10/11/24 0918   Vitals shown include unfiled device data.        Post Anesthesia Care and Evaluation    Patient location during evaluation: bedside  Patient participation: complete - patient participated  Level of consciousness: awake  Pain score: 0  Pain management: adequate    Airway patency: patent  Anesthetic complications: No anesthetic complications  PONV Status: controlled  Cardiovascular status: acceptable and stable  Respiratory status: acceptable and room air  Hydration status: acceptable    Comments: See nursing documentation for post op vital signs

## 2024-10-11 NOTE — NON STRESS TEST
Carol Vega, a  at 39w3d with an MACI of 10/15/2024, by Ultrasound, was seen at [facility] for a nonstress test.    Chief Complaint   Patient presents with    Scheduled      repeat       Patient Active Problem List   Diagnosis    Severe episode of recurrent major depressive disorder, with psychotic features    Oppositional defiant disorder    MDD (major depressive disorder), severe    Cannabis abuse, continuous    PTSD (post-traumatic stress disorder)    Exposure to lead    Calculus of gallbladder without cholecystitis without obstruction    BMI 45.0-49.9, adult    Hx of  section    Short interval between pregnancies affecting pregnancy, antepartum    Abnormal genetic test during pregnancy    Morbid obesity with BMI of 40.0-44.9, adult    Pregnancy    Previous  section       Start Time: 0520  Stop Time: 0600    Interpretation A  Nonstress Test Interpretation A: Reactive

## 2024-10-11 NOTE — PLAN OF CARE
Goal Outcome Evaluation:  Plan of Care Reviewed With: patient        Progress: improving  Outcome Evaluation: VSS, I & O adequate, positive bonding observed, pain controlled with oral medication.

## 2024-10-11 NOTE — PAYOR COMM NOTE
"TO:[facility]  FROM:MEENAKSHI NAVARRO, RN PHONE [phone]  CLINICALS    Carol Vega (24 y.o. Female)       Date of Birth   2000    Social Security Number       Address   Jesika MARTIN [address] [place] [id number]    Home Phone   [phone]    MRN   [id number]       Hoahaoism   None    Marital Status   Single                            Admission Date   10/11/24    Admission Type   Elective    Admitting Provider   Ludwig Hernandez MD    Attending Provider   Tenisha Lane MD    Department, Room/Bed   [facility], W2       Discharge Date       Discharge Disposition       Discharge Destination                                 Attending Provider: Tenisha Lane MD    Allergies: Iodine, Latex    Isolation: None   Infection: None   Code Status: CPR    Ht: 160 cm (63\")   Wt: 128 kg (282 lb 6.4 oz)    Admission Cmt: None   Principal Problem: Previous  section [Z98.891]                   Active Insurance as of 10/11/2024       Primary Coverage       Payor Plan Insurance Group Employer/Plan Group    [employer] [place] [employer] MEDICAID        Payor Plan Address Payor Plan Phone Number Payor Plan Fax Number Effective Dates    [address] [address] [phone]  2018 - None Entered    [facility] [id number]         Subscriber Name Subscriber Birth Date Member ID       CAROL VEGA 2000 [id number]                     Emergency Contacts        (Rel.) Home Phone Work Phone Mobile Phone    Joi Dickson (Mother) [phone] -- [phone]    James Dickson (Father) [phone] -- --                 History & Physical        Ludwig Hernandez MD at 10/11/24 0625          OBSTETRICS HISTORY AND PHYSICAL    CHIEF COMPLAINT: Scheduled C/S    DIAGNOSIS:  IUP at 39w3d  Previous C/S with planned repeat C/S  Short interval pregnancy   BMI 50  Anemia    ASSESSMENT/PLAN     24 y.o.  at 39w3d who presents for scheduled C/S     #  section  - Risks for the " procedure were reviewed  - Hgb 9.2 --> 1u PRBC on hold  - Placenta posterior    # Fetal Wellbeing   - Fetal tracing: Cat 1, reactive  - Ultrasound: reviewed   - Group B Streptococcus: positive   - Presentation: cephalic confirmed by recent U/S    # Routine Obstetrics  - Labs reviewed  - MBT: O +    HISTORY OF PRESENT ILLNESS     24 y.o.  at 39w3d who presents for scheduled C/S     OB Review of Systems:  Fetal movement: active  Vaginal bleeding: no  Loss of fluid: no  Contractions: no    Preeclampsia Symptoms Review:  Headaches: no  Vision changes:no  Chest pain and/or shortness of breath: no  RUQ pain: no    REVIEW OF SYSTEMS: A complete review of systems was performed and was specifically negative for headache, changes in vision, RUQ pain, shortness of breath, chest pain, lower extremity edema and dysuria.     HISTORY:  Obstetrical History:  OB History    Para Term  AB Living   4 1 1   2 1   SAB IAB Ectopic Molar Multiple Live Births   2       0 1      # Outcome Date GA Lbr Dangelo/2nd Weight Sex Type Anes PTL Lv   4 Current            3 SAB 2024           2 Term 23 39w2d  3400 g (7 lb 7.9 oz) F CS-LTranv EPI N MARCIA      Complications: Failure to Progress in First Stage   1 SAB  5w0d    SAB         Obstetric Comments   Fob #1 - Pregnancy #1 - #4        Past Medical History:  Past Medical History:   Diagnosis Date    Anxiety     Endometriosis     Hx of Chlamydia     with last Pregnancy    Hx of Gonorrhea     age 18    Hx of Kidney stone     Hx of Seizures     trauma induced years ago/ last one was 2-3 years ago - no medications    Major depression     Migraine     PMS (premenstrual syndrome)     PTSD (post-traumatic stress disorder)     Self-injurious behavior     hx of cutting starting at age 15    Suicide attempt     3/25/19-overdose attempt, 2017 overdose attempt    Urinary tract infection        Past Surgical History:  Past Surgical History:   Procedure Laterality Date      SECTION N/A 2023    Procedure:  SECTION PRIMARY;  Surgeon: Ludwig Hernandez MD;  Location: [facility] OR;  Service: Obstetrics/Gynecology;  Laterality: N/A;    CHOLECYSTECTOMY N/A 10/16/2023    Procedure: CHOLECYSTECTOMY LAPAROSCOPIC;  Surgeon: Noemy Tsang MD;  Location: [facility] OR;  Service: General;  Laterality: N/A;    ESOPHAGEAL DILATATION      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    TONSILLECTOMY         Social History:  Social History     Tobacco Use    Smoking status: Never     Passive exposure: Never    Smokeless tobacco: Never    Tobacco comments:     vapes   Vaping Use    Vaping status: Every Day    Substances: Nicotine, Flavoring    Devices: Disposable   Substance Use Topics    Alcohol use: Never    Drug use: Not Currently     Types: Marijuana     Comment: pt reports marjuana x 1 during pregnancy       Family History  Family History   Problem Relation Age of Onset    Rheum arthritis Mother     Depression Mother     Drug abuse Mother     Heart murmur Father     Drug abuse Father     Alcohol abuse Father           OBJECTIVE   VITALS:  Temp:  [98.3 °F (36.8 °C)] 98.3 °F (36.8 °C)  Heart Rate:  [105] 105  Resp:  [16] 16  BP: (104)/(67) 104/67    PHYSICAL EXAM:  GENERAL: NAD, alert  CHEST: No increased work of breathing, CTAB  CV: RRR, WWP  ABDOMEN: Gravid, nontender  EXTREMITIES:  Warm and well-perfused, nontender, nonedematous  NEURO: AAO x 3, CN II-XII grossly intact    Fetal Monitoring:  Cat 1, reactive     Allergies:   Allergies   Allergen Reactions    Iodine Hives    Latex Hives       No current facility-administered medications on file prior to encounter.     Current Outpatient Medications on File Prior to Encounter   Medication Sig Dispense Refill    ferrous sulfate 325 (65 FE) MG tablet Take 1 tablet by mouth 2 (Two) Times a Day. 60 tablet 4    Prenatal Vit-Fe Fumarate-FA (PRENATAL VITAMINS PO) Take  by mouth.         DIAGNOSTIC STUDIES:  Results from last 7 days   Lab Units  10/11/24  0559   WBC 10*3/mm3 16.38*   HEMOGLOBIN g/dL 9.2*   HEMATOCRIT % 28.7*   PLATELETS 10*3/mm3 258       Recent Results (from the past 24 hour(s))   POC Urinalysis Dipstick    Collection Time: 10/11/24  5:15 AM    Specimen: Urine   Result Value Ref Range    Color Yellow Yellow, Straw, Dark Yellow, Kristen    Clarity, UA Clear Clear    Glucose, UA Negative Negative mg/dL    Bilirubin Negative Negative    Ketones, UA Negative Negative    Specific Gravity  1.010 1.005 - 1.030    Blood, UA Negative Negative    pH, Urine 6.5 5.0 - 8.0    Protein, POC Negative Negative mg/dL    Urobilinogen, UA Normal Normal, 0.2 E.U./dL    Leukocytes Negative Negative    Nitrite, UA Negative Negative   CBC Auto Differential    Collection Time: 10/11/24  5:59 AM    Specimen: Blood   Result Value Ref Range    WBC 16.38 (H) 3.40 - 10.80 10*3/mm3    RBC 3.82 3.77 - 5.28 10*6/mm3    Hemoglobin 9.2 (L) 12.0 - 15.9 g/dL    Hematocrit 28.7 (L) 34.0 - 46.6 %    MCV 75.1 (L) 79.0 - 97.0 fL    MCH 24.1 (L) 26.6 - 33.0 pg    MCHC 32.1 31.5 - 35.7 g/dL    RDW 17.7 (H) 12.3 - 15.4 %    RDW-SD 47.2 37.0 - 54.0 fl    MPV 11.7 6.0 - 12.0 fL    Platelets 258 140 - 450 10*3/mm3    Neutrophil % 68.8 42.7 - 76.0 %    Lymphocyte % 21.1 19.6 - 45.3 %    Monocyte % 7.0 5.0 - 12.0 %    Eosinophil % 1.5 0.3 - 6.2 %    Basophil % 0.3 0.0 - 1.5 %    Immature Grans % 1.3 (H) 0.0 - 0.5 %    Neutrophils, Absolute 11.28 (H) 1.70 - 7.00 10*3/mm3    Lymphocytes, Absolute 3.46 (H) 0.70 - 3.10 10*3/mm3    Monocytes, Absolute 1.14 (H) 0.10 - 0.90 10*3/mm3    Eosinophils, Absolute 0.24 0.00 - 0.40 10*3/mm3    Basophils, Absolute 0.05 0.00 - 0.20 10*3/mm3    Immature Grans, Absolute 0.21 (H) 0.00 - 0.05 10*3/mm3    nRBC 0.0 0.0 - 0.2 /100 WBC       Ludwig Hernandez MD  Obstetrics and Gynecology  [facility]       Electronically signed by Ludwig Hernandez MD at 10/11/24 2525       Vital Signs (last day)       Date/Time Temp Temp src Pulse Resp BP Patient  Position SpO2    10/11/24 1041 97.8 (36.6) Oral 96 17 116/76 Lying 97    10/11/24 1015 98.7 (37.1) Temporal 88 18 110/79 Lying 94    10/11/24 1010 -- -- 90 16 96/71 Lying 96    10/11/24 1000 -- -- 96 16 98/61 Lying 97    10/11/24 0955 -- -- 88 16 100/58 Lying 96    10/11/24 0950 -- -- 89 16 104/57 Lying 96    10/11/24 0945 -- -- 97 16 107/62 Lying 96    10/11/24 0940 -- -- 100 16 108/63 Lying 98    10/11/24 0935 -- -- 101 16 102/68 Lying 95    10/11/24 0930 -- -- 101 16 104/72 Lying 97    10/11/24 0920 97.9 (36.6) Temporal 101 16 121/75 Lying 100    10/11/24 0524 98.3 (36.8) Oral 105 16 104/67 Sitting 100          Operative/Procedure Notes (last 24 hours)  Notes from 10/10/24 1133 through 10/11/24 1133   No notes of this type exist for this encounter.       Physician Progress Notes (last 24 hours)  Notes from 10/10/24 1133 through 10/11/24 1133   No notes of this type exist for this encounter.

## 2024-10-11 NOTE — ANESTHESIA PREPROCEDURE EVALUATION
Anesthesia Evaluation     Patient summary reviewed and Nursing notes reviewed   no history of anesthetic complications:   NPO Solid Status: > 8 hours  NPO Liquid Status: > 8 hours           Airway   Mallampati: I  TM distance: >3 FB  Neck ROM: full  no difficulty expected  Dental - normal exam     Pulmonary - negative pulmonary ROS and normal exam   Cardiovascular - negative cardio ROS and normal exam        Neuro/Psych- negative ROS  (+) seizures, headaches, psychiatric history  GI/Hepatic/Renal/Endo - negative ROS   (+) obesity, morbid obesity, renal disease-    Musculoskeletal (-) negative ROS    Abdominal    Substance History - negative use     OB/GYN negative ob/gyn ROS   (+) Pregnant        Other - negative ROS                   Anesthesia Plan    ASA 3     spinal       Anesthetic plan, risks, benefits, and alternatives have been provided, discussed and informed consent has been obtained with: patient.    CODE STATUS:    Level Of Support Discussed With: Patient  Code Status (Patient has no pulse and is not breathing): CPR (Attempt to Resuscitate)  Medical Interventions (Patient has pulse or is breathing): Full Support

## 2024-10-11 NOTE — OP NOTE
Marty Vega  : 2000  MRN: [id number]  CSN: [id number]    Operative Report    Pre-Operative Dx:   IUP at 39w3d weeks   Previous C/S with planned repeat C/S  Short interval pregnancy   BMI 50  Anemia      Postoperative dx:    Same  Uterine window     Procedure: Repeat  (LTCS)       Surgeon:    Surgical assistant: SHANELLE Luna was responsible for performing the following activities: Retraction, Suction, Placing Dressing, and Delivery of Fetus and their skilled assistance was necessary for the success of this case.       OR Staff:   Circulator: Barbara Mittal, KORTNEY; Jocelyne Sahu RN  Scrub Person: Radha Pat, REN; Patricia Wu  Baby Nurse: Reena Pat RN; Leilani Riley RN       Anesthesia: General        EBL: 1,000 mls.       Antibiotics: cefazolin 3 gms     Drains:    Specimens: Penny    None     Findings:  Normal appearing uterus, fallopian tubes and ovaries.  Uterine window.    Infant details        Infant observations: Weight: 3515 g (7 lb 12 oz)   Length: 20  in   Apgars: 7  @ 1 minute /    9  @ 5 minutes     Procedure Details:   The patient was taken to the operating room where regional anesthesia was attempted but unsuccessful.  She was prepared and draped in the normal sterile fashion in the dorsal supine position with a leftward tilt.  General anesthesia was implemented and a Pfannenstiel skin incision was made with the scalpel and carried through to the underlying layer of fascia. The fascia was incised in the midline and the incision extended laterally with the Segal scissors. The superior aspect of the fascial incision was grasped with the Kocher clamps, elevated and the underlying rectus muscles dissected off sharply. Attention was then turned to the inferior aspect of the fascial incision, which was grasped and tented up with the Kocher clamps. The underlying muscles were dissected off in a similar fashion. The rectus muscles were then   in the midline, and the peritoneum identified, and entered bluntly. The peritoneal incision was extended superiorly and inferiorly with good visualization of the bladder. The Arnie retractor was inserted atraumatically. The vesicouterine peritoneum was identified, grasped with the pickups, entered sharply, and the bladder flap was created digitally.     A low transverse uterine incision was made with the scalpel well above the bladder reflection and extended in a cephalad/caudad fashion. The infant’s head was delivered atraumatically followed by the shoulders and body. The infant was vigorous and cord clamping was delayed x 30 seconds with stimulation and suctioning of the nose and mouth. The cord was then clamped and cut and the infant was handed off to waiting staff.     The placenta was then removed with gentle traction on the cord and uterine massage.  The uterus was cleared of all clots and debris with a wet lap sponge.  The uterine incision was inspected carefully and a left-sided extension was noted.  The hysterotomy was repaired with a 0 monocryl in a running locked fashion. The Arnie retractor was removed and the gutters were cleared of all clots.  The uterine incision was reinspected and made sufficiently hemostatic with Bovie and Surgicel.    The fascia was elevated and the rectus muscles and subfascial tissues were made hemostatic with the bovie. The peritoneum was closed with 3-0 chromic in a running fashion. The fascia was closed with 0 PDS in a running fashion.  The subcutaneous tissues were then irrigated and the bovie was used to achieve satisfactory hemostasis. The subcutaneous space was closed with 3-0 chromic. The skin was closed with Insorb staples. Sponge, lap, needle, and instrument counts were correct per the OR staff.  The patient tolerated the procedure well and was taken to the recovery room in stable condition. She will be admitted to the postpartum unit for her post-operative  care.      Complications:   None      Disposition:   Mother to Mother Baby/Postpartum in stable condition currently.   Baby to remain with the mother in stable condition currently.     Ludwig Hernandez MD  Obstetrics and Gynecology  [facility]  10/11/2024  09:31 EDT

## 2024-10-11 NOTE — ANESTHESIA PROCEDURE NOTES
Peripheral Block      Patient reassessed immediately prior to procedure    Start time: 10/11/2024 9:00 AM  Stop time: 10/11/2024 9:09 AM  Reason for block: at surgeon's request and post-op pain management  Preanesthetic Checklist  Completed: patient identified, IV checked, site marked, risks and benefits discussed, surgical consent, monitors and equipment checked, pre-op evaluation and timeout performed  Prep:  Pt Position: supine  Sterile barriers:gloves, cap, sterile barriers and mask  Prep: ChloraPrep  Patient monitoring: blood pressure monitoring, continuous pulse oximetry and EKG  Procedure    Guidance:ultrasound guided  Images:still images obtained    Laterality:Bilateral  Block Type:TAP  Injection Technique:single-shot  Needle Type:echogenic  Resistance on Injection: none    Medications Used: bupivacaine PF (MARCAINE) injection 0.25% - Injection   60 mL - 10/11/2024 9:09:00 AM      Medications  Comment:Block Injection: LA dose divided between Right and Left Block  Adjuncts per total volume of LA:    Decadron 0 mg PSF      If required, intravenous sedation was given -- see meds on anesthesia record.    Post Assessment  Injection Assessment: negative aspiration for heme, no paresthesia on injection and incremental injection  Patient Tolerance:comfortable throughout block  Complications:no  Additional Notes  Procedure:      BILATERAL TAP BLOCKS                             Patient analgesia was achieved with General Anesthesia    The pt was placed in the Supine Position and under Ultrasound guidance, an echogenic or touhy needle was advanced with Normal Saline hydro dissection of tissue.  The Internal Oblique and Transversus Abdominus muscles were visualized.  At or before the aponeurosis of Internal Oblique, the local anesthetic spread was visualized in the Transversus Abdominus Plane. Injection was made incrementally with aspiration every 5 mls.  There was no intravascular injection;  injection pressure was  normal; there was no neural injection; and the procedure was completed without difficulty.    Performed by: Morgan Najera CRNA

## 2024-10-12 LAB
BASOPHILS # BLD AUTO: 0.03 10*3/MM3 (ref 0–0.2)
BASOPHILS # BLD AUTO: 0.04 10*3/MM3 (ref 0–0.2)
BASOPHILS NFR BLD AUTO: 0.2 % (ref 0–1.5)
BASOPHILS NFR BLD AUTO: 0.2 % (ref 0–1.5)
DEPRECATED RDW RBC AUTO: 51.3 FL (ref 37–54)
DEPRECATED RDW RBC AUTO: 53.2 FL (ref 37–54)
EOSINOPHIL # BLD AUTO: 0.01 10*3/MM3 (ref 0–0.4)
EOSINOPHIL # BLD AUTO: 0.12 10*3/MM3 (ref 0–0.4)
EOSINOPHIL NFR BLD AUTO: 0.1 % (ref 0.3–6.2)
EOSINOPHIL NFR BLD AUTO: 0.6 % (ref 0.3–6.2)
ERYTHROCYTE [DISTWIDTH] IN BLOOD BY AUTOMATED COUNT: 17.9 % (ref 12.3–15.4)
ERYTHROCYTE [DISTWIDTH] IN BLOOD BY AUTOMATED COUNT: 18.7 % (ref 12.3–15.4)
HCT VFR BLD AUTO: 24.4 % (ref 34–46.6)
HCT VFR BLD AUTO: 28.3 % (ref 34–46.6)
HGB BLD-MCNC: 7.6 G/DL (ref 12–15.9)
HGB BLD-MCNC: 8.8 G/DL (ref 12–15.9)
IMM GRANULOCYTES # BLD AUTO: 0.29 10*3/MM3 (ref 0–0.05)
IMM GRANULOCYTES # BLD AUTO: 0.36 10*3/MM3 (ref 0–0.05)
IMM GRANULOCYTES NFR BLD AUTO: 1.5 % (ref 0–0.5)
IMM GRANULOCYTES NFR BLD AUTO: 1.9 % (ref 0–0.5)
LYMPHOCYTES # BLD AUTO: 2.4 10*3/MM3 (ref 0.7–3.1)
LYMPHOCYTES # BLD AUTO: 3.78 10*3/MM3 (ref 0.7–3.1)
LYMPHOCYTES NFR BLD AUTO: 12 % (ref 19.6–45.3)
LYMPHOCYTES NFR BLD AUTO: 20.2 % (ref 19.6–45.3)
MCH RBC QN AUTO: 24.6 PG (ref 26.6–33)
MCH RBC QN AUTO: 24.7 PG (ref 26.6–33)
MCHC RBC AUTO-ENTMCNC: 31.1 G/DL (ref 31.5–35.7)
MCHC RBC AUTO-ENTMCNC: 31.1 G/DL (ref 31.5–35.7)
MCV RBC AUTO: 79.1 FL (ref 79–97)
MCV RBC AUTO: 79.2 FL (ref 79–97)
MONOCYTES # BLD AUTO: 1.35 10*3/MM3 (ref 0.1–0.9)
MONOCYTES # BLD AUTO: 1.42 10*3/MM3 (ref 0.1–0.9)
MONOCYTES NFR BLD AUTO: 7.1 % (ref 5–12)
MONOCYTES NFR BLD AUTO: 7.2 % (ref 5–12)
NEUTROPHILS NFR BLD AUTO: 13.02 10*3/MM3 (ref 1.7–7)
NEUTROPHILS NFR BLD AUTO: 15.77 10*3/MM3 (ref 1.7–7)
NEUTROPHILS NFR BLD AUTO: 69.9 % (ref 42.7–76)
NEUTROPHILS NFR BLD AUTO: 79.1 % (ref 42.7–76)
NRBC BLD AUTO-RTO: 0.1 /100 WBC (ref 0–0.2)
NRBC BLD AUTO-RTO: 0.2 /100 WBC (ref 0–0.2)
PLATELET # BLD AUTO: 215 10*3/MM3 (ref 140–450)
PLATELET # BLD AUTO: 223 10*3/MM3 (ref 140–450)
PMV BLD AUTO: 11.5 FL (ref 6–12)
PMV BLD AUTO: 12.1 FL (ref 6–12)
RBC # BLD AUTO: 3.08 10*6/MM3 (ref 3.77–5.28)
RBC # BLD AUTO: 3.58 10*6/MM3 (ref 3.77–5.28)
WBC NRBC COR # BLD AUTO: 18.67 10*3/MM3 (ref 3.4–10.8)
WBC NRBC COR # BLD AUTO: 19.92 10*3/MM3 (ref 3.4–10.8)

## 2024-10-12 PROCEDURE — 0503F POSTPARTUM CARE VISIT: CPT | Performed by: OBSTETRICS & GYNECOLOGY

## 2024-10-12 PROCEDURE — 36430 TRANSFUSION BLD/BLD COMPNT: CPT

## 2024-10-12 PROCEDURE — 85025 COMPLETE CBC W/AUTO DIFF WBC: CPT | Performed by: OBSTETRICS & GYNECOLOGY

## 2024-10-12 PROCEDURE — 86900 BLOOD TYPING SEROLOGIC ABO: CPT

## 2024-10-12 PROCEDURE — 25010000002 ENOXAPARIN PER 10 MG: Performed by: OBSTETRICS & GYNECOLOGY

## 2024-10-12 PROCEDURE — P9016 RBC LEUKOCYTES REDUCED: HCPCS

## 2024-10-12 RX ADMIN — ENOXAPARIN SODIUM 40 MG: 100 INJECTION SUBCUTANEOUS at 10:00

## 2024-10-12 RX ADMIN — OXYCODONE 10 MG: 5 TABLET ORAL at 22:11

## 2024-10-12 RX ADMIN — PRENATAL VITAMINS-IRON FUMARATE 27 MG IRON-FOLIC ACID 0.8 MG TABLET 1 TABLET: at 09:00

## 2024-10-12 RX ADMIN — ACETAMINOPHEN 1000 MG: 500 TABLET, FILM COATED ORAL at 11:41

## 2024-10-12 RX ADMIN — ACETAMINOPHEN 1000 MG: 500 TABLET, FILM COATED ORAL at 04:32

## 2024-10-12 RX ADMIN — SIMETHICONE 80 MG: 80 TABLET, CHEWABLE ORAL at 23:07

## 2024-10-12 RX ADMIN — SIMETHICONE 80 MG: 80 TABLET, CHEWABLE ORAL at 16:58

## 2024-10-12 RX ADMIN — IBUPROFEN 800 MG: 800 TABLET, FILM COATED ORAL at 09:00

## 2024-10-12 RX ADMIN — ACETAMINOPHEN 1000 MG: 500 TABLET, FILM COATED ORAL at 20:08

## 2024-10-12 RX ADMIN — ENOXAPARIN SODIUM 40 MG: 100 INJECTION SUBCUTANEOUS at 21:13

## 2024-10-12 RX ADMIN — IBUPROFEN 800 MG: 800 TABLET, FILM COATED ORAL at 00:44

## 2024-10-12 RX ADMIN — IBUPROFEN 800 MG: 800 TABLET, FILM COATED ORAL at 16:43

## 2024-10-12 RX ADMIN — OXYCODONE 10 MG: 5 TABLET ORAL at 16:43

## 2024-10-12 NOTE — PLAN OF CARE
Goal Outcome Evaluation:                      VSS, adequate I/O, pain managed with ordered medication, pt received one unit of blood per MD orders, ambulating without difficulty, lochia WDL, positive bonding with infant observed

## 2024-10-12 NOTE — PROGRESS NOTES
Daily Progress Note    Service: OB/GYN        Hospital Day: 2   Attending: Tenisha Lane MD     Diagnoses:         POD#1 s/p rLTCS at 39+3 weeks  Previous C/S with planned repeat C/S  Short interval pregnancy   BMI 50  Acute on chronic anemia requiring blood transfusion    Subjective:                                                                                            No acute issues in the past 24 hours   Nausea/vomiting: no nausea and no vomiting  BM/Flatus: yes  Voiding: yes  Pain is well controlled with current regimen.  Ambulating: yes    ROS: She denies chest pain, shortness of breath, dizziness, lightheadedness, leg pain or leg swelling. All other systems reviewed and negative.    Objective:      24hour vital signs:   Temp:  [97.5 °F (36.4 °C)-98.7 °F (37.1 °C)] 97.8 °F (36.6 °C)  Heart Rate:  [] 95  Resp:  [16-19] 18  BP: ()/(50-79) 97/62     I/O last 3 completed shifts:  In: 4760 [P.O.:1160; I.V.:2500; Blood:300; Other:800]  Out: 4397 [Urine:3430; Blood:967]     General:    alert and no distress   HEENT:   NCAT, MMM   Cardiovascular:   RRR, no murmurs   Pulmonary:    CTAB, no wheezing, no crackles, unlabored breathing   Abdomen:  active bowel sounds, no TTP, no distension   Incision:  healing well, no significant drainage, no dehiscence, no significant erythema   Extremities:  no edema, wwp, SCDs in place b/l        Labs:  Results from last 7 days   Lab Units 10/12/24  0554 10/11/24  2138 10/11/24  0559   WBC 10*3/mm3 19.92*  --  16.38*   HEMOGLOBIN g/dL 7.6* 6.9* 9.2*   HEMATOCRIT % 24.4* 21.9* 28.7*   PLATELETS 10*3/mm3 215  --  258                Other Notable Lab and Imaging Results:      Medications:  Scheduled Meds:  acetaminophen, 1,000 mg, Oral, Q8H  enoxaparin, 40 mg, Subcutaneous, Q12H  ibuprofen, 800 mg, Oral, Q8H  ketorolac, 30 mg, Intravenous, Once  oxytocin, 333 mL/hr, Intravenous, Once  prenatal vitamin, 1 tablet, Oral, Daily           Continuous Infusions:           PRN  Meds:    carboprost    diphenhydrAMINE **OR** diphenhydrAMINE    famotidine **OR** famotidine    Hydrocortisone (Perianal)    hydrOXYzine    influenza vaccine    Measles, Mumps & Rubella Vac    methylergonovine    miSOPROStol    Morphine    ondansetron    ondansetron ODT    oxyCODONE **OR** oxyCODONE    oxytocin    oxytocin **FOLLOWED BY** oxytocin    promethazine **OR** promethazine    simethicone    tranexamic acid      .Assessment:     24 y.o. year old  female  POD#1 s/p rLTCS at 39+3 weeks  Previous C/S with planned repeat C/S  Short interval pregnancy   BMI 50  Acute on chronic anemia requiring blood transfusion    Plan:     - Pain: well controlled with current regimen  - CV: No issues  - DVT ppx: SCD's, Lovenox sq, ambulate TID  - Pulm: IS bedside  - GI: Phenergan/Zofran prn nausea  - : No voiding issues. Adequate UOP.   - Heme: 9.2 --> 6.9 --> 1u pRBC on 10/11 --> 7.6 this AM --> 1 additional unit pRBC this morning with plan to recheck hemoglobin tomorrow morning  - ID: Received appropriate preoperative antibiotics. No current issues  - FEN: SLIV. Regular diet, advance as tolerated. Replete lytes prn.  - Endocrine: No issues  - Wound: Routine postop care  - Plan for Nexplanon insertion tomorrow    Ludwig Hernandez MD  Obstetrics and Gynecology  [facility]

## 2024-10-12 NOTE — PLAN OF CARE
Goal Outcome Evaluation:            Pt. Voiding without difficult. Pain controlled  on current pain medication.  Pt. Received 1 more unit of PRBC. Pt. Ambulated hallway and plans on taking shower shortly and removing dressing.  Fundus firm without massage, bleeding WNL.

## 2024-10-13 VITALS
BODY MASS INDEX: 50.04 KG/M2 | RESPIRATION RATE: 18 BRPM | DIASTOLIC BLOOD PRESSURE: 61 MMHG | SYSTOLIC BLOOD PRESSURE: 107 MMHG | TEMPERATURE: 98.2 F | OXYGEN SATURATION: 96 % | HEART RATE: 88 BPM | HEIGHT: 63 IN | WEIGHT: 282.4 LBS

## 2024-10-13 DIAGNOSIS — D64.9 ACUTE ON CHRONIC ANEMIA: Primary | ICD-10-CM

## 2024-10-13 LAB
BH BB BLOOD EXPIRATION DATE: NORMAL
BH BB BLOOD EXPIRATION DATE: NORMAL
BH BB BLOOD TYPE BARCODE: 5100
BH BB BLOOD TYPE BARCODE: 5100
BH BB DISPENSE STATUS: NORMAL
BH BB DISPENSE STATUS: NORMAL
BH BB PRODUCT CODE: NORMAL
BH BB PRODUCT CODE: NORMAL
BH BB UNIT NUMBER: NORMAL
BH BB UNIT NUMBER: NORMAL
CROSSMATCH INTERPRETATION: NORMAL
CROSSMATCH INTERPRETATION: NORMAL
HCT VFR BLD AUTO: 25.9 % (ref 34–46.6)
HGB BLD-MCNC: 8 G/DL (ref 12–15.9)
UNIT  ABO: NORMAL
UNIT  ABO: NORMAL
UNIT  RH: NORMAL
UNIT  RH: NORMAL

## 2024-10-13 PROCEDURE — 0503F POSTPARTUM CARE VISIT: CPT | Performed by: OBSTETRICS & GYNECOLOGY

## 2024-10-13 PROCEDURE — 85014 HEMATOCRIT: CPT | Performed by: OBSTETRICS & GYNECOLOGY

## 2024-10-13 PROCEDURE — 85018 HEMOGLOBIN: CPT | Performed by: OBSTETRICS & GYNECOLOGY

## 2024-10-13 PROCEDURE — 25010000002 ENOXAPARIN PER 10 MG: Performed by: OBSTETRICS & GYNECOLOGY

## 2024-10-13 RX ORDER — OXYCODONE HYDROCHLORIDE 5 MG/1
5 TABLET ORAL EVERY 6 HOURS PRN
Qty: 10 TABLET | Refills: 0 | Status: SHIPPED | OUTPATIENT
Start: 2024-10-13

## 2024-10-13 RX ORDER — ACETAMINOPHEN 500 MG
1000 TABLET ORAL EVERY 8 HOURS PRN
Qty: 60 TABLET | Refills: 0 | Status: SHIPPED | OUTPATIENT
Start: 2024-10-13 | End: 2025-10-13

## 2024-10-13 RX ORDER — ALUMINUM ZIRCONIUM OCTACHLOROHYDREX GLY 16 G/100G
GEL TOPICAL DAILY
Qty: 283 G | Refills: 3 | Status: SHIPPED | OUTPATIENT
Start: 2024-10-13

## 2024-10-13 RX ORDER — DOCUSATE SODIUM 100 MG/1
100 CAPSULE, LIQUID FILLED ORAL 2 TIMES DAILY
Qty: 60 CAPSULE | Refills: 1 | Status: SHIPPED | OUTPATIENT
Start: 2024-10-13

## 2024-10-13 RX ORDER — FERROUS SULFATE 325(65) MG
325 TABLET ORAL 2 TIMES DAILY
Qty: 60 TABLET | Refills: 4 | Status: SHIPPED | OUTPATIENT
Start: 2024-10-13

## 2024-10-13 RX ORDER — IBUPROFEN 800 MG/1
800 TABLET, FILM COATED ORAL EVERY 8 HOURS PRN
Qty: 30 TABLET | Refills: 0 | Status: SHIPPED | OUTPATIENT
Start: 2024-10-13

## 2024-10-13 RX ADMIN — IBUPROFEN 800 MG: 800 TABLET, FILM COATED ORAL at 00:29

## 2024-10-13 RX ADMIN — ACETAMINOPHEN 1000 MG: 500 TABLET, FILM COATED ORAL at 04:01

## 2024-10-13 RX ADMIN — PRENATAL VITAMINS-IRON FUMARATE 27 MG IRON-FOLIC ACID 0.8 MG TABLET 1 TABLET: at 08:50

## 2024-10-13 RX ADMIN — ENOXAPARIN SODIUM 40 MG: 100 INJECTION SUBCUTANEOUS at 08:50

## 2024-10-13 RX ADMIN — IBUPROFEN 800 MG: 800 TABLET, FILM COATED ORAL at 08:50

## 2024-10-13 NOTE — DISCHARGE SUMMARY
OBSTETRICS DISCHARGE SUMMARY    Admission Date: 10/11/2024    Discharge Date:  10/13/2024     Discharge Diagnosis:   POD#2 s/p rLTCS at 39+3 weeks  Previous C/S with planned repeat C/S  Short interval pregnancy   BMI 50  Acute on chronic anemia requiring blood transfusion    Procedures/Delivery information:  10/11/2024  Repeat  (LTCS)     Consults:  None    Pertinent Labs/Immaging:  Postpartum Hgb - 8.0    Hospital Summary  Carol Vega is a 24 y.o.  who was admitted on 10/11 for scheduled  delivery.  She underwent delivery without complication.  Please see the operative note for additional details.    During the postpartum period, the patient met all postoperative goals including ambulating without difficulty, voiding without difficulty, passage of flatus and adequate intake and output. Her pain was well-controlled with PO medicines. The patient's lochia was appropriate. The patient was breast/bottle feeding. The patient was discharged home on POD#2 in good condition with directions to follow-up in 1 week with our office. The patient is interested in Nexplanon for contraception and we will plan to place that device at her 1 week postop visit.    Discharge Medications:     Discharge Medications        New Medications        Instructions Start Date   acetaminophen 500 MG tablet  Commonly known as: TYLENOL   1,000 mg, Oral, Every 8 Hours PRN      docusate sodium 100 MG capsule  Commonly known as: Colace   100 mg, Oral, 2 Times Daily      ibuprofen 800 MG tablet  Commonly known as: ADVIL,MOTRIN   800 mg, Oral, Every 8 Hours PRN      Metamucil Smooth Texture 58.6 % powder  Generic drug: psyllium   Oral, Daily      oxyCODONE 5 MG immediate release tablet  Commonly known as: Roxicodone   5 mg, Oral, Every 6 Hours PRN             Continue These Medications        Instructions Start Date   ferrous sulfate 325 (65 FE) MG tablet   325 mg, Oral, 2 Times Daily      PRENATAL VITAMINS PO   Oral                Physical Exam on D/C  Gen: NAD  CV: RRR  Pulm: resps unlabored  Abd: soft, minimally tender, non-distended, fundus firm and non-tender below umbilicus  Incision: Well-healing, Insorb staples, no erythema, no drainage  Ext: wwp, non-tender, SCDs in place    Follow up:  1 week in our office    Time spent on discharge: 15 minutes    Ludwig Hernandez MD  Obstetrics and Gynecology  [facility]

## 2024-10-13 NOTE — DISCHARGE INSTRUCTIONS
Section  Home Care Instructions:  Dr. Ludwig Hernandez     Thank you for choosing [facility] for the care of your pregnancy. Please let us know if you have questions or concerns or do not understand the information we give you. Always ask us to explain words or phrases you do not understand.     You have undergone a  delivery. Here are some basic guidelines to help you recover more quickly at home. Your doctor may give you more guidelines. If you have any questions after you go home, please call the numbers listed on the next page.     General Guidelines     1.   Do not lift anything over 10 pounds for about six weeks. This includes pushing or pulling heavy objects.  2.   Do not put anything in the vagina (no tampons or douching).    3.   Do not have sex until cleared by your physician.  4.   You may climb steps.  5.   You may bathe or shower.  6.   The only exercise you should do is walking. This is important for your recovery.  7.   Do not drive while taking narcotics.  8.   Take the medicines you were taking before surgery. Other medicines you need to take at home are:     Alternate Motrin and Tylenol around the clock. Take each every 8 hours in alternation so that you are getting one of the medications every 4 hours.   Roxicodone 5mg tab  - One tablet by mouth every 4-6 hours as needed for breakthrough pain   Metamucil + Colace daily to keep bowel movements soft          If you have questions about your medicines, let us know. Or, talk to your local pharmacist.     Surgery, lack of activity, pain medicines and iron pills tend to cause constipation. Take something every day to prevent constipation and straining.  Taking something like Metamucil® every day to increase fiber may prevent constipation. Follow the instructions on the container. If you need a gentle laxative, then something like Milk of Magnesia® or Correctol® work fairly well. You should not need to take laxatives  often.     10. Call your doctor if you have:      a.         Fever of 101F degrees or higher.  b.         Heavy vaginal bleeding.  c.         Increased redness around your incision (cut); incision pulling apart; drainage (pus), bleeding, or a large amount of fluid from your incision.  d.         Worsening nausea or pain.  e.         Other problems or concerns.     If you have any questions or concerns about your usual routines, please talk to the nurse or doctor.         To talk with your doctor at [facility], call:  Obstetrics and Gynecology Outpatient Clinic  Phone: [phone]        We appreciate the opportunity you have given us to participate in your care.

## 2024-10-13 NOTE — PLAN OF CARE
Goal Outcome Evaluation:         VSS, bonding well with infant. Lochia wnl, pain controlled with rx meds. Pt encouraged pt to walk and shower/sitz bath.

## 2024-10-13 NOTE — PLAN OF CARE
Goal Outcome Evaluation:     Problem: Postpartum ( Delivery)  Goal: Successful Parent Role Transition  Outcome: Met  Goal: Hemostasis  Outcome: Met  Goal: Effective Bowel Elimination  Outcome: Met  Goal: Absence of Infection Signs and Symptoms  Outcome: Met  Goal: Optimal Pain Control and Function  Outcome: Met  Goal: Nausea and Vomiting Relief  Outcome: Met  Goal: Effective Urinary Elimination  Outcome: Met  Goal: Effective Oxygenation and Ventilation  Outcome: Met  Intervention: Optimize Oxygenation and Ventilation  Recent Flowsheet Documentation  Taken 10/13/2024 0800 by Kylah Agarwal RN  Head of Bed (HOB) Positioning: HOB elevated     Problem: Adult Inpatient Plan of Care  Goal: Absence of Hospital-Acquired Illness or Injury  Intervention: Identify and Manage Fall Risk  Recent Flowsheet Documentation  Taken 10/13/2024 0900 by Kylah Agarwal RN  Safety Promotion/Fall Prevention:   safety round/check completed   assistive device/personal items within reach  Taken 10/13/2024 0800 by Kylah Agarwal RN  Safety Promotion/Fall Prevention:   safety round/check completed   assistive device/personal items within reach  Intervention: Prevent Skin Injury  Recent Flowsheet Documentation  Taken 10/13/2024 08 by Kylah Agarwal RN  Body Position: position changed independently  Intervention: Prevent and Manage VTE (Venous Thromboembolism) Risk  Recent Flowsheet Documentation  Taken 10/13/2024 0800 by Kylah Agarwal RN  VTE Prevention/Management:   bilateral   SCDs (sequential compression devices) off   patient refused intervention     Problem: Change in Fetal Wellbeing ( Delivery)  Goal: Stable Fetal Wellbeing  Intervention: Promote and Monitor Fetal Wellbeing  Recent Flowsheet Documentation  Taken 10/13/2024 0800 by Kylah Agarwal RN  Body Position: position changed independently      VSS, pain controlled with current medication regimen, lochia WNL, incision site healing well with no signs of  infection present, bottlefeeding infant, positive maternal infant bonding observed. Pt to be discharged home today.

## 2024-10-13 NOTE — NURSING NOTE
Pt encouraged several times throughout evening to shower and remove incision bandage.   Pt showered at approx 2300 and removed bandage. Incision healing well. No drainage, redness, edema noted. Steri strips holding well. Moisture dressing applied between skin folds.

## 2024-10-14 ENCOUNTER — TELEPHONE (OUTPATIENT)
Dept: OBSTETRICS AND GYNECOLOGY | Facility: CLINIC | Age: 24
End: 2024-10-14
Payer: COMMERCIAL

## 2024-10-14 NOTE — TELEPHONE ENCOUNTER
Caller: Carol Vega    Relationship: Self    Best call back number: [phone]    What is the best time to reach you: ASAP    Who are you requesting to speak with (clinical staff, provider,  specific staff member): CLINICAL    What was the call regarding: HAD A MISSED CALL ASKING TO CALL BACK    Is it okay if the provider responds through MyChart: CALL BACK     unable to get information due to confusion

## 2024-10-14 NOTE — PAYOR COMM NOTE
"To:  [employer]  From: Filomena Hernandez RN  Phone: [phone]  Fax: [phone]  NPI: [id number]  TIN: [id number]  Member ID: [id number]   MRN: [id number]    Carol Vega (24 y.o. Female)       Date of Birth   2000    Social Security Number       Address   [facility] [address] [place] [address]    Home Phone   [phone]    MRN   [id number]       Religious   None    Marital Status   Single                            Admission Date   10/11/24    Admission Type   Elective    Admitting Provider   Ludwig Hernandez MD    Attending Provider       Department, Room/Bed   [facility] OB GYN, W2       Discharge Date   10/13/2024    Discharge Disposition   Home or Self Care    Discharge Destination                                 Attending Provider: (none)   Allergies: Iodine, Latex    Isolation: None   Infection: None   Code Status: Prior    Ht: 160 cm (63\")   Wt: 128 kg (282 lb 6.4 oz)    Admission Cmt: None   Principal Problem: Previous  section [Z98.891]                   Active Insurance as of 10/11/2024       Primary Coverage       Payor Plan Insurance Group Employer/Plan Group    [employer] [place] [employer]        Payor Plan Address Payor Plan Phone Number Payor Plan Fax Number Effective Dates    [address] [address] [phone]  2018 - None Entered    [facility] [id number]         Subscriber Name Subscriber Birth Date Member ID       CAROL VEGA 2000 [id number]                     Emergency Contacts        (Rel.) Home Phone Work Phone Mobile Phone    Joi Dickson (Mother) [phone] -- [phone]    James Dickson (Father) [phone] -- --                 Discharge Summary        Ludwig Hernandez MD at 10/13/24 0724          OBSTETRICS DISCHARGE SUMMARY    Admission Date: 10/11/2024    Discharge Date:  10/13/2024     Discharge Diagnosis:   POD#2 s/p rLTCS at 39+3 weeks  Previous C/S with planned repeat C/S  Short interval pregnancy   BMI 50  Acute on " chronic anemia requiring blood transfusion    Procedures/Delivery information:  10/11/2024  Repeat  (LTCS)     Consults:  None    Pertinent Labs/Immaging:  Postpartum Hgb - 8.0    Hospital Summary  Carol Vega is a 24 y.o.  who was admitted on 10/11 for scheduled  delivery.  She underwent delivery without complication.  Please see the operative note for additional details.    During the postpartum period, the patient met all postoperative goals including ambulating without difficulty, voiding without difficulty, passage of flatus and adequate intake and output. Her pain was well-controlled with PO medicines. The patient's lochia was appropriate. The patient was breast/bottle feeding. The patient was discharged home on POD#2 in good condition with directions to follow-up in 1 week with our office. The patient is interested in Nexplanon for contraception and we will plan to place that device at her 1 week postop visit.    Discharge Medications:     Discharge Medications        New Medications        Instructions Start Date   acetaminophen 500 MG tablet  Commonly known as: TYLENOL   1,000 mg, Oral, Every 8 Hours PRN      docusate sodium 100 MG capsule  Commonly known as: Colace   100 mg, Oral, 2 Times Daily      ibuprofen 800 MG tablet  Commonly known as: ADVIL,MOTRIN   800 mg, Oral, Every 8 Hours PRN      Metamucil Smooth Texture 58.6 % powder  Generic drug: psyllium   Oral, Daily      oxyCODONE 5 MG immediate release tablet  Commonly known as: Roxicodone   5 mg, Oral, Every 6 Hours PRN             Continue These Medications        Instructions Start Date   ferrous sulfate 325 (65 FE) MG tablet   325 mg, Oral, 2 Times Daily      PRENATAL VITAMINS PO   Oral               Physical Exam on D/C  Gen: NAD  CV: RRR  Pulm: resps unlabored  Abd: soft, minimally tender, non-distended, fundus firm and non-tender below umbilicus  Incision: Well-healing, Insorb staples, no erythema, no drainage  Ext: wwp,  non-tender, SCDs in place    Follow up:  1 week in our office    Time spent on discharge: 15 minutes    Ludwig Hernandez MD  Obstetrics and Gynecology  [facility]     Electronically signed by Ludwig Hernandez MD at 10/14/24 0925

## 2024-10-20 ENCOUNTER — LAB (OUTPATIENT)
Dept: LAB | Facility: HOSPITAL | Age: 24
End: 2024-10-20
Payer: COMMERCIAL

## 2024-10-20 LAB
HCT VFR BLD AUTO: 36.6 % (ref 34–46.6)
HGB BLD-MCNC: 11.5 G/DL (ref 12–15.9)

## 2024-10-20 PROCEDURE — 85018 HEMOGLOBIN: CPT | Performed by: OBSTETRICS & GYNECOLOGY

## 2024-10-20 PROCEDURE — 85014 HEMATOCRIT: CPT | Performed by: OBSTETRICS & GYNECOLOGY

## 2024-10-21 ENCOUNTER — OFFICE VISIT (OUTPATIENT)
Dept: OBSTETRICS AND GYNECOLOGY | Facility: CLINIC | Age: 24
End: 2024-10-21
Payer: COMMERCIAL

## 2024-10-21 VITALS
BODY MASS INDEX: 45.36 KG/M2 | SYSTOLIC BLOOD PRESSURE: 112 MMHG | DIASTOLIC BLOOD PRESSURE: 78 MMHG | WEIGHT: 256 LBS | HEIGHT: 63 IN

## 2024-10-21 DIAGNOSIS — Z30.017 NEXPLANON INSERTION: Primary | ICD-10-CM

## 2024-10-21 DIAGNOSIS — Z98.891 S/P CESAREAN SECTION: ICD-10-CM

## 2024-10-21 PROCEDURE — 11981 INSERTION DRUG DLVR IMPLANT: CPT | Performed by: OBSTETRICS & GYNECOLOGY

## 2024-10-21 PROCEDURE — 99024 POSTOP FOLLOW-UP VISIT: CPT | Performed by: OBSTETRICS & GYNECOLOGY

## 2024-10-21 NOTE — PROGRESS NOTES
"Postoperative Assessment Visit    Subjective   Chief Complaint   Patient presents with    Post-op     1 wk . Doing well.        24 y.o.  female who presents for a post-op visit.    Pre-Operative Dx:   IUP at 39w3d weeks   Previous C/S with planned repeat C/S  Short interval pregnancy   BMI 50  Anemia       Postoperative dx:    Same  Uterine window      Procedure: Repeat  (LTCS)     The patient is doing well with no issues.     Objective   Vitals:    10/21/24 1412   BP: 112/78   Weight: 116 kg (256 lb)   Height: 160 cm (63\")     Physical Exam:  Incision(s): Well-healing, no signs of infection or separation  Reassuring postoperative exam       Medical Decision Making:    I have reviewed the operative note.  I have reviewed the postoperative labs where appropriate.    Assessment   rLTCS  Post-op evaluation  Anemia, improved  Nexplanon insertion     Plan    No orders of the defined types were placed in this encounter.      Medication Management: none    Patient is meeting all post-op milestones.  Surgical findings discussed.  Postoperative precautions and restrictions reviewed.  Hemoglobin rebounded to 11.5.  Nexplanon inserted today.    RTC for full PP exam in 5 weeks    Nexplanon Insertion    No LMP recorded (lmp unknown).    Date of procedure:  10/21/2024    Risks and benefits discussed? yes  All questions answered? yes  Consents given by the patient  Written consent obtained? yes    Local anesthesia used:  yes - 2 cc's of  Meds; anesthesia local: 1% lidocaine with epinephrine    Procedure documentation:    The upper left arm (non-dominant) was marked at the intended site of insertion. An alcohol wipe was used to cleanse the skin.  Local anesthesia was injected. The arm was further cleaned with betadine. The Nexplanon was placed subdermally without difficulty.  The device was able to be palpated in the arm by both myself and Simity.  Steri-strips were then placed across the site of insertion " and the arm was wrapped.    She tolerated the procedure well.  There were no complications.  EBL was minimal.    Post procedure instructions: Remove the wrapping in 24 hours and the steri-strips in 5 days.  The patient has been advised to contact the office if she develops fever, redness, swelling, pain, or discharge occurs at the procedure site.  She has been counseled on the effectiveness of her contraception as well.    Ludwig Hernandez MD  Obstetrics and Gynecology  [facility]

## 2024-10-22 ENCOUNTER — MATERNAL SCREENING (OUTPATIENT)
Dept: CALL CENTER | Facility: HOSPITAL | Age: 24
End: 2024-10-22
Payer: COMMERCIAL

## 2024-10-22 NOTE — OUTREACH NOTE
Maternal Screening Survey      Flowsheet Row Responses   Facility patient discharged from? [facility]   Attempt successful? No   Unsuccessful attempts Attempt 1              Zaria MERAZ - Registered Nurse

## 2024-10-22 NOTE — OUTREACH NOTE
Maternal Screening Survey      Flowsheet Row Responses   Facility patient discharged from? [facility]   Attempt successful? Yes   Call start time 1254   Call end time 1257   I have been able to laugh and see the funny side of things. 0   I have looked forward with enjoyment to things. 0   I have blamed myself unnecessarily when things went wrong. 2   I have been anxious or worried for no good reason. 1   I have felt scared or panicky for no good reason. 1   Things have been getting on top of me. 0   I have been so unhappy that I have had difficulty sleeping. 0   I have felt sad or miserable. 0   I have been so unhappy that I have been crying. 0   The thought of harming myself has occurred to me. 0   [place]  Depression Scale Total 4   Did any of your parents have problems with alcohol or drug use? No   Do any of your peers have problems with alcohol or drug use? No   Does your partner have problems with alcohol or drug use? No   Before you were pregnant did you have problems with alcohol or drug use? (past) No   In the past month, did you drink beer, wine, liquor or use any other drugs? (pregnancy) No   Maternal Screening call completed Yes   Call end time 1257              Zaria MERAZ - Registered Nurse

## 2024-11-25 ENCOUNTER — POSTPARTUM VISIT (OUTPATIENT)
Dept: OBSTETRICS AND GYNECOLOGY | Facility: CLINIC | Age: 24
End: 2024-11-25
Payer: COMMERCIAL

## 2024-11-25 VITALS
DIASTOLIC BLOOD PRESSURE: 70 MMHG | WEIGHT: 254 LBS | BODY MASS INDEX: 45 KG/M2 | HEIGHT: 63 IN | SYSTOLIC BLOOD PRESSURE: 122 MMHG

## 2024-11-25 DIAGNOSIS — Z12.4 SCREENING FOR CERVICAL CANCER: ICD-10-CM

## 2024-11-25 PROBLEM — O28.5 ABNORMAL GENETIC TEST DURING PREGNANCY: Status: RESOLVED | Noted: 2024-05-14 | Resolved: 2024-11-25

## 2024-11-25 PROBLEM — Z98.891 PREVIOUS CESAREAN SECTION: Status: RESOLVED | Noted: 2024-09-09 | Resolved: 2024-11-25

## 2024-11-25 PROBLEM — O09.899 SHORT INTERVAL BETWEEN PREGNANCIES AFFECTING PREGNANCY, ANTEPARTUM: Status: RESOLVED | Noted: 2024-03-14 | Resolved: 2024-11-25

## 2024-11-25 PROCEDURE — 0503F POSTPARTUM CARE VISIT: CPT | Performed by: MIDWIFE

## 2024-11-25 NOTE — PROGRESS NOTES
"History  Chief Complaint   Patient presents with    Postpartum Care     6 week postpartum care - has 2 vaginal sores she popped and wants looked at        Carol Vega is a 24 y.o. year old  presenting to be seen for her postpartum visit.  She had a Repeat  (LTCS).    Since delivery she has been sexually active.   She does not have concerns about post-partum blues/depression.   She is bottle feeding.  For ongoing contraception, her plans are Nexplanon. It was inserted 2 weeks postpartum   She has not had a menstrual cycle. She has continued to bleed since Nexplanon inserted.         Physical  /70   Ht 160 cm (63\")   Wt 115 kg (254 lb)   LMP  (LMP Unknown)   Breastfeeding No   BMI 44.99 kg/m²     General:  well developed; well nourished  no acute distress  mentation appropriate   Abdomen: soft, non-tender; no masses  incision is healed   Pelvis: Clinical staff was present for exam  External genitalia:  Molluscum x 1 noted on left buttock        Assessment   Normal 6 week postpartum exam  Contraceptive management  Molluscum      Plan   BC options reviewed and compared today: Nexplanon  Pap smear due, bleeding today  Follow up 1-2 months for annual exam.    No orders of the defined types were placed in this encounter.         This note was electronically signed.  Heather Joyner, GWEN  2024  "

## 2025-01-10 ENCOUNTER — OFFICE VISIT (OUTPATIENT)
Dept: OBSTETRICS AND GYNECOLOGY | Facility: CLINIC | Age: 25
End: 2025-01-10
Payer: COMMERCIAL

## 2025-01-10 VITALS
SYSTOLIC BLOOD PRESSURE: 122 MMHG | BODY MASS INDEX: 45 KG/M2 | HEIGHT: 63 IN | WEIGHT: 254 LBS | DIASTOLIC BLOOD PRESSURE: 80 MMHG

## 2025-01-10 DIAGNOSIS — Z97.5 NEXPLANON IN PLACE: ICD-10-CM

## 2025-01-10 DIAGNOSIS — Z01.419 WELL WOMAN EXAM WITH ROUTINE GYNECOLOGICAL EXAM: Primary | ICD-10-CM

## 2025-01-10 DIAGNOSIS — Z12.4 PAP SMEAR FOR CERVICAL CANCER SCREENING: ICD-10-CM

## 2025-01-13 PROBLEM — Z97.5 NEXPLANON IN PLACE: Status: ACTIVE | Noted: 2025-01-13

## 2025-01-13 NOTE — PROGRESS NOTES
Annual Well Woman Visit    Subjective   Chief Complaint   Patient presents with    Gynecologic Exam     Last pap  ASCUS HPV +      Carol Vega is a 24 y.o. year old  presenting to be seen for an annual well woman visit.    No major issues.    She denies sexual dysfunction. She denies urinary complaints including incontinence.    OB Hx:  x 2, SAB x 2  Contraception: Nexplanon  Pap smear:  ASCUS HPV positive    Past Medical History:   Diagnosis Date    Anxiety     Endometriosis     Hx of Chlamydia     with last Pregnancy    Hx of Gonorrhea     age 18    Hx of Kidney stone     Hx of Seizures     trauma induced years ago/ last one was 2-3 years ago - no medications    Major depression     Migraine     PMS (premenstrual syndrome)     PTSD (post-traumatic stress disorder)     Self-injurious behavior     hx of cutting starting at age 15    Suicide attempt     3/25/19-overdose attempt, 2017 overdose attempt    Urinary tract infection      Past Surgical History:   Procedure Laterality Date     SECTION N/A 2023    Procedure:  SECTION PRIMARY;  Surgeon: Ludwig Hernandez MD;  Location: [facility] OR;  Service: Obstetrics/Gynecology;  Laterality: N/A;     SECTION N/A 10/11/2024    Procedure:  SECTION REPEAT;  Surgeon: Ludwig Hernandez MD;  Location: [facility] OR;  Service: Obstetrics/Gynecology;  Laterality: N/A;    CHOLECYSTECTOMY N/A 10/16/2023    Procedure: CHOLECYSTECTOMY LAPAROSCOPIC;  Surgeon: Noemy Tsang MD;  Location: [facility] OR;  Service: General;  Laterality: N/A;    ESOPHAGEAL DILATATION      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    TONSILLECTOMY       Family History   Problem Relation Age of Onset    Rheum arthritis Mother     Depression Mother     Drug abuse Mother     Heart murmur Father     Drug abuse Father     Alcohol abuse Father      Social History     Tobacco Use    Smoking status: Never     Passive exposure: Never    Smokeless  "tobacco: Never    Tobacco comments:     vapes   Vaping Use    Vaping status: Every Day    Substances: Nicotine, Flavoring    Devices: Disposable   Substance Use Topics    Alcohol use: Never    Drug use: Not Currently     Types: Marijuana     Comment: pt reports marjuana x 1 during pregnancy     (Not in a hospital admission)    Iodine and Latex  No current outpatient medications on file prior to visit.     No current facility-administered medications on file prior to visit.     Social History    Tobacco Use      Smoking status: Never        Passive exposure: Never      Smokeless tobacco: Never      Tobacco comments: vapes      Review of Systems  Pertinent items are noted in HPI, all other systems were reviewed and negative       Objective   /80   Ht 160 cm (63\")   Wt 115 kg (254 lb)   BMI 44.99 kg/m²     Physical Exam:  General Appearance: alert, pleasant, appears stated age, interactive and cooperative  Breasts: Not performed.  Abdomen: no masses, no hepatomegaly, no splenomegaly, soft non-tender, no guarding and no rebound tenderness    Pelvis:  Pelvic: Clinical staff was present for exam  External genitalia:  normal appearance of the external genitalia including Bartholin's and Horace's glands.  :  urethral meatus normal;  Vagina:  normal pink mucosa without prolapse or lesions.  Cervix:  normal appearance.  Uterus:  normal size, shape and consistency.  Adnexa:  normal bimanual exam of the adnexa.  Rectal:  digital rectal exam not performed; anus visually normal appearing.       Assessment   Annual well woman exam with age appropriate screening  Nexplanon in place  Abnormal Pap smear: ASCUS, HPV positive     Plan    No orders of the defined types were placed in this encounter.    Medications ordered: None    Procedures performed: Pap smear    - Mammogram: Not indicated   - Pap screening guidelines reviewed; pap smear repeated today  - Yearly clinical breast and pelvic exams recommended regardless of pap " recommendations  - Dexa scan: not indicated   - Colonoscopy: not indicated   - Contraception: Nexplanon  - Screening: none    Follow up for annual visits    Ludwig Hernandez MD  Obstetrics and Gynecology  [facility]

## 2025-01-14 LAB — REF LAB TEST METHOD: NORMAL

## 2025-03-21 ENCOUNTER — OFFICE VISIT (OUTPATIENT)
Dept: INTERNAL MEDICINE | Facility: CLINIC | Age: 25
End: 2025-03-21
Payer: COMMERCIAL

## 2025-03-21 VITALS
TEMPERATURE: 98.3 F | HEIGHT: 63 IN | DIASTOLIC BLOOD PRESSURE: 76 MMHG | WEIGHT: 241 LBS | BODY MASS INDEX: 42.7 KG/M2 | RESPIRATION RATE: 18 BRPM | OXYGEN SATURATION: 100 % | SYSTOLIC BLOOD PRESSURE: 128 MMHG | HEART RATE: 80 BPM

## 2025-03-21 DIAGNOSIS — E66.813 CLASS 3 SEVERE OBESITY DUE TO EXCESS CALORIES WITHOUT SERIOUS COMORBIDITY WITH BODY MASS INDEX (BMI) OF 40.0 TO 44.9 IN ADULT: ICD-10-CM

## 2025-03-21 DIAGNOSIS — E66.01 CLASS 3 SEVERE OBESITY DUE TO EXCESS CALORIES WITHOUT SERIOUS COMORBIDITY WITH BODY MASS INDEX (BMI) OF 40.0 TO 44.9 IN ADULT: ICD-10-CM

## 2025-03-21 DIAGNOSIS — R53.83 FATIGUE, UNSPECIFIED TYPE: Primary | ICD-10-CM

## 2025-03-21 DIAGNOSIS — F44.9 DISSOCIATION: ICD-10-CM

## 2025-03-21 DIAGNOSIS — E61.1 IRON DEFICIENCY: ICD-10-CM

## 2025-03-21 DIAGNOSIS — F41.9 ANXIETY: ICD-10-CM

## 2025-03-21 DIAGNOSIS — R10.31 BILATERAL LOWER ABDOMINAL CRAMPING: ICD-10-CM

## 2025-03-21 DIAGNOSIS — R10.32 BILATERAL LOWER ABDOMINAL CRAMPING: ICD-10-CM

## 2025-03-21 DIAGNOSIS — R53.1 GENERALIZED WEAKNESS: ICD-10-CM

## 2025-03-21 PROBLEM — Z90.49 S/P CHOLECYSTECTOMY: Status: ACTIVE | Noted: 2023-03-06

## 2025-03-21 PROCEDURE — 1126F AMNT PAIN NOTED NONE PRSNT: CPT | Performed by: PHYSICIAN ASSISTANT

## 2025-03-21 PROCEDURE — 1160F RVW MEDS BY RX/DR IN RCRD: CPT | Performed by: PHYSICIAN ASSISTANT

## 2025-03-21 PROCEDURE — 99214 OFFICE O/P EST MOD 30 MIN: CPT | Performed by: PHYSICIAN ASSISTANT

## 2025-03-21 PROCEDURE — 1159F MED LIST DOCD IN RCRD: CPT | Performed by: PHYSICIAN ASSISTANT

## 2025-03-21 RX ORDER — HYDROXYZINE HYDROCHLORIDE 25 MG/1
12.5-25 TABLET, FILM COATED ORAL 3 TIMES DAILY PRN
Qty: 90 TABLET | Refills: 1 | Status: SHIPPED | OUTPATIENT
Start: 2025-03-21

## 2025-03-21 RX ORDER — DESVENLAFAXINE 25 MG/1
25 TABLET, EXTENDED RELEASE ORAL DAILY
Qty: 90 TABLET | Refills: 0 | Status: SHIPPED | OUTPATIENT
Start: 2025-03-21

## 2025-03-21 NOTE — PROGRESS NOTES
"     Office Visit      Patient Name: Carol Vega  : 2000   MRN: [id number]     Chief Complaint:    Chief Complaint   Patient presents with    Extremity Weakness     Arm weakness, and states she feels like she can't think straight. Also wants labs     Abdominal Pain     Worsening     Dizziness    Medication Problem     Wants to discuss if symptoms are related to nexplannon        History of Present Illness: Carol Vega is a 24 y.o. female who is here today accompanied by her mother to discuss anxiety and abdominal pain.    Lower abdominal pain gradually worsening since onset 1-2 months ago. Pain occurs around twice per day, unknown trigger. Pain lasts up to 6-30 minutes with varying degrees of severity during that time. Pain somewhat better if she applies pressure to the lower abdomen. Last had pain yesterday. S/p cholecystectomy. Has diarrhea regularly since cholecystectomy. No constipation.  She is 5 months postpartum and has Nexplanon in place for contraception which she feels may be contributing to abdominal pain and anxiety.  Per patient report,  5 months ago was complicated by uterine window with rupture.  When she used Nexplanon as a teenager she did not do well with it for the 3 years in use, although symptoms did improve some over time. Experienced anger, anxiety and dissociation. Current symptoms somewhat similar except anxiety now much worse and she is not dealing with as much anger.  She has been having near panic attacks many times per day for around 1 week, somewhat improved the last couple of days.  With the episodes she has a hard time lifting arms due to weak feeling. States she has been unable to \"think straight\" for a little over a week. Dizziness and lightheadedness intermittently over the last week or so.  She has previously taken hydroxyzine for anxiety and thinks it may have been helpful but is not entirely sure.  She also tried a couple of antidepressants but never felt they " "worked well.    Started vitamin D two days ago.  No other medication or supplement use.    Subjective      I have reviewed and the following portions of the patient's history were updated as appropriate: past family history, past medical history, past social history, past surgical history and problem list.      Current Outpatient Medications:     Desvenlafaxine Succinate ER (Pristiq) 25 MG tablet sustained-release 24 hour, Take 1 tablet by mouth Daily., Disp: 90 tablet, Rfl: 0    hydrOXYzine (ATARAX) 25 MG tablet, Take 0.5-1 tablets by mouth 3 (Three) Times a Day As Needed for Anxiety or Itching., Disp: 90 tablet, Rfl: 1    Allergies   Allergen Reactions    Iodine Hives    Latex Hives       Objective     Vital Signs:   Vitals:    03/21/25 1315   BP: 128/76   Pulse: 80   Resp: 18   Temp: 98.3 °F (36.8 °C)   SpO2: 100%   Weight: 109 kg (241 lb)   Height: 160 cm (63\")     Body mass index is 42.69 kg/m².    Physical Exam  Vitals and nursing note reviewed.   Constitutional:       General: She is not in acute distress.     Appearance: She is well-developed. She is obese. She is not ill-appearing, toxic-appearing or diaphoretic.   HENT:      Head: Normocephalic and atraumatic.      Right Ear: External ear normal.      Left Ear: External ear normal.   Eyes:      General: No scleral icterus.     Extraocular Movements: Extraocular movements intact.      Conjunctiva/sclera: Conjunctivae normal.      Pupils: Pupils are equal, round, and reactive to light.   Cardiovascular:      Rate and Rhythm: Normal rate and regular rhythm.      Heart sounds: Normal heart sounds. No murmur heard.     No friction rub. No gallop.   Pulmonary:      Effort: Pulmonary effort is normal. No respiratory distress.      Breath sounds: Normal breath sounds. No wheezing, rhonchi or rales.   Chest:      Chest wall: No tenderness.   Abdominal:      General: Bowel sounds are normal.      Palpations: Abdomen is soft.      Tenderness: There is abdominal " tenderness in the periumbilical area and suprapubic area. There is no right CVA tenderness, left CVA tenderness or guarding. Negative signs include Morgan's sign, Rovsing's sign, McBurney's sign, psoas sign and obturator sign.       Musculoskeletal:         General: No tenderness or deformity. Normal range of motion.      Cervical back: Normal range of motion and neck supple. No rigidity or tenderness.      Right lower leg: No edema.      Left lower leg: No edema.   Lymphadenopathy:      Cervical: No cervical adenopathy.   Skin:     General: Skin is warm and dry.      Capillary Refill: Capillary refill takes less than 2 seconds.      Findings: No rash.   Neurological:      Mental Status: She is alert and oriented to person, place, and time.      Cranial Nerves: No cranial nerve deficit.      Sensory: No sensory deficit.      Motor: No abnormal muscle tone.      Coordination: Coordination normal.      Deep Tendon Reflexes: Reflexes normal.   Psychiatric:         Attention and Perception: Attention and perception normal.         Mood and Affect: Mood normal. Mood is anxious. Mood is not depressed.         Speech: Speech normal.         Behavior: Behavior normal. Behavior is cooperative.         Thought Content: Thought content normal.         Cognition and Memory: Cognition and memory normal.         Judgment: Judgment normal.         Common labs          10/12/2024    05:54 10/12/2024    18:05 10/13/2024    06:00 10/20/2024    12:28   Common Labs   WBC 19.92  18.67      Hemoglobin 7.6  8.8  8.0  11.5    Hematocrit 24.4  28.3  25.9  36.6    Platelets 215  223            Assessment / Plan      Assessment/Plan:   Diagnoses and all orders for this visit:    1. Fatigue, unspecified type (Primary)  -     CBC & Differential  -     Comprehensive Metabolic Panel  -     TSH Rfx On Abnormal To Free T4  -     Hemoglobin A1c  -     HCG, B-subunit, Quantitative  -     Vitamin B12  -     Folate  -     Vitamin D,25-Hydroxy  -      Ferritin  -     Iron Profile    2. Generalized weakness  -     CBC & Differential  -     Comprehensive Metabolic Panel  -     TSH Rfx On Abnormal To Free T4  -     Hemoglobin A1c    3. Dissociation  -     TSH Rfx On Abnormal To Free T4    4. Bilateral lower abdominal cramping  -     HCG, B-subunit, Quantitative    5. Class 3 severe obesity due to excess calories without serious comorbidity with body mass index (BMI) of 40.0 to 44.9 in adult  -     CBC & Differential  -     Comprehensive Metabolic Panel  -     TSH Rfx On Abnormal To Free T4  -     Hemoglobin A1c    6. Anxiety  -     Desvenlafaxine Succinate ER (Pristiq) 25 MG tablet sustained-release 24 hour; Take 1 tablet by mouth Daily.  Dispense: 90 tablet; Refill: 0  -     hydrOXYzine (ATARAX) 25 MG tablet; Take 0.5-1 tablets by mouth 3 (Three) Times a Day As Needed for Anxiety or Itching.  Dispense: 90 tablet; Refill: 1    7. Iron deficiency  -     Ferritin  -     Iron Profile       Schedule with GYN to discuss pelvic pain and nexplanon.     [facility] reviewed, start Pristiq 25 mg daily.  May also use hydroxyzine as needed for anxiety.  She does have a therapist.    Complete ordered labs.    I spent 35 minutes caring for Carol on this date of service. This time includes time spent by me in the following activities:preparing for the visit, reviewing tests, obtaining and/or reviewing a separately obtained history, performing a medically appropriate examination and/or evaluation , counseling and educating the patient/family/caregiver, ordering medications, tests, or procedures, and documenting information in the medical record    Follow Up:   Return in 6 weeks (on 5/2/2025) for Annual physical, Next scheduled follow up.    Patient was given instructions and counseling regarding her condition or for health maintenance advice. Please see specific information pulled into the AVS if appropriate.     Jeana Penny PA-C  [facility] [address] [facility]     Please  note that portions of this note may have been completed with a voice recognition program.

## 2025-03-22 LAB
25(OH)D3+25(OH)D2 SERPL-MCNC: 26 NG/ML (ref 30–100)
ALBUMIN SERPL-MCNC: 4.7 G/DL (ref 4–5)
ALP SERPL-CCNC: 116 IU/L (ref 44–121)
ALT SERPL-CCNC: 47 IU/L (ref 0–32)
AST SERPL-CCNC: 47 IU/L (ref 0–40)
BASOPHILS # BLD AUTO: 0.1 X10E3/UL (ref 0–0.2)
BASOPHILS NFR BLD AUTO: 1 %
BILIRUB SERPL-MCNC: 1 MG/DL (ref 0–1.2)
BUN SERPL-MCNC: 10 MG/DL (ref 6–20)
BUN/CREAT SERPL: 14 (ref 9–23)
CALCIUM SERPL-MCNC: 10.1 MG/DL (ref 8.7–10.2)
CHLORIDE SERPL-SCNC: 106 MMOL/L (ref 96–106)
CO2 SERPL-SCNC: 19 MMOL/L (ref 20–29)
CREAT SERPL-MCNC: 0.74 MG/DL (ref 0.57–1)
EGFRCR SERPLBLD CKD-EPI 2021: 117 ML/MIN/1.73
EOSINOPHIL # BLD AUTO: 0.2 X10E3/UL (ref 0–0.4)
EOSINOPHIL NFR BLD AUTO: 3 %
ERYTHROCYTE [DISTWIDTH] IN BLOOD BY AUTOMATED COUNT: 14.6 % (ref 11.7–15.4)
FERRITIN SERPL-MCNC: 23 NG/ML (ref 15–150)
FOLATE SERPL-MCNC: 6.8 NG/ML
GLOBULIN SER CALC-MCNC: 2.3 G/DL (ref 1.5–4.5)
GLUCOSE SERPL-MCNC: 80 MG/DL (ref 70–99)
HBA1C MFR BLD: 5.2 % (ref 4.8–5.6)
HCG INTACT+B SERPL-ACNC: <1 MIU/ML
HCT VFR BLD AUTO: 44.1 % (ref 34–46.6)
HGB BLD-MCNC: 14.6 G/DL (ref 11.1–15.9)
IMM GRANULOCYTES # BLD AUTO: 0 X10E3/UL (ref 0–0.1)
IMM GRANULOCYTES NFR BLD AUTO: 0 %
IRON SATN MFR SERPL: 15 % (ref 15–55)
IRON SERPL-MCNC: 58 UG/DL (ref 27–159)
LYMPHOCYTES # BLD AUTO: 2.5 X10E3/UL (ref 0.7–3.1)
LYMPHOCYTES NFR BLD AUTO: 27 %
MCH RBC QN AUTO: 28.2 PG (ref 26.6–33)
MCHC RBC AUTO-ENTMCNC: 33.1 G/DL (ref 31.5–35.7)
MCV RBC AUTO: 85 FL (ref 79–97)
MONOCYTES # BLD AUTO: 0.6 X10E3/UL (ref 0.1–0.9)
MONOCYTES NFR BLD AUTO: 7 %
NEUTROPHILS # BLD AUTO: 5.9 X10E3/UL (ref 1.4–7)
NEUTROPHILS NFR BLD AUTO: 62 %
PLATELET # BLD AUTO: 291 X10E3/UL (ref 150–450)
POTASSIUM SERPL-SCNC: 4.5 MMOL/L (ref 3.5–5.2)
PROT SERPL-MCNC: 7 G/DL (ref 6–8.5)
RBC # BLD AUTO: 5.18 X10E6/UL (ref 3.77–5.28)
SODIUM SERPL-SCNC: 141 MMOL/L (ref 134–144)
TIBC SERPL-MCNC: 376 UG/DL (ref 250–450)
TSH SERPL DL<=0.005 MIU/L-ACNC: 1.36 UIU/ML (ref 0.45–4.5)
UIBC SERPL-MCNC: 318 UG/DL (ref 131–425)
VIT B12 SERPL-MCNC: 392 PG/ML (ref 232–1245)
WBC # BLD AUTO: 9.3 X10E3/UL (ref 3.4–10.8)

## 2025-04-09 ENCOUNTER — OFFICE VISIT (OUTPATIENT)
Dept: OBSTETRICS AND GYNECOLOGY | Facility: CLINIC | Age: 25
End: 2025-04-09
Payer: COMMERCIAL

## 2025-04-09 VITALS
WEIGHT: 237.2 LBS | HEIGHT: 63 IN | SYSTOLIC BLOOD PRESSURE: 110 MMHG | BODY MASS INDEX: 42.03 KG/M2 | DIASTOLIC BLOOD PRESSURE: 72 MMHG

## 2025-04-09 DIAGNOSIS — Z30.46 ENCOUNTER FOR NEXPLANON REMOVAL: Primary | ICD-10-CM

## 2025-04-09 DIAGNOSIS — Z30.014 ENCOUNTER FOR INITIAL PRESCRIPTION OF INTRAUTERINE CONTRACEPTIVE DEVICE (IUD): ICD-10-CM

## 2025-04-09 NOTE — PROGRESS NOTES
Nexplanon Removal     Patient's last menstrual period was 03/19/2025 (approximate).    Date of procedure:  4/9/2025    Risks and benefits discussed? yes  All questions answered? yes  Consents given by the patient  Written consent obtained? yes    Local anesthesia used:  yes - 2 cc's of  Meds; anesthesia local: 1% lidocaine with epinephrine    Procedure documentation:    The upper left arm (non-dominant) was marked at the intended site of removal. An alcohol wipe was used to cleanse the skin.  Local anesthesia was injected. The arm was further cleaned with betadine. A vertical incision was created at the distal tip of the implant.  The implant was removed intact without difficulty.    Steri-strips were then placed across the site of insertion and the arm was wrapped.    She tolerated the procedure well.  There were no complications.  EBL was minimal.    Post procedure instructions: Remove the wrapping in 24 hours and the steri-strips in 5 days.  The patient has been advised to contact the office if she develops fever, redness, swelling, pain, or discharge occurs at the procedure site. Kyleena IUD ordered today. Return for placement when available.      Follow up needed: Kyleena IUD ordered today. Return for placement when available.    Ludwig Hernandez MD  Obstetrics and Gynecology  [facility]

## 2025-04-16 ENCOUNTER — OFFICE VISIT (OUTPATIENT)
Dept: OBSTETRICS AND GYNECOLOGY | Facility: CLINIC | Age: 25
End: 2025-04-16
Payer: COMMERCIAL

## 2025-04-16 VITALS
DIASTOLIC BLOOD PRESSURE: 74 MMHG | SYSTOLIC BLOOD PRESSURE: 110 MMHG | HEIGHT: 63 IN | WEIGHT: 237.2 LBS | BODY MASS INDEX: 42.03 KG/M2

## 2025-04-16 DIAGNOSIS — Z32.02 NEGATIVE PREGNANCY TEST: ICD-10-CM

## 2025-04-16 DIAGNOSIS — Z30.430 ENCOUNTER FOR INSERTION OF INTRAUTERINE CONTRACEPTIVE DEVICE: Primary | ICD-10-CM

## 2025-04-16 LAB
B-HCG UR QL: NEGATIVE
EXPIRATION DATE: NORMAL
INTERNAL NEGATIVE CONTROL: NORMAL
INTERNAL POSITIVE CONTROL: NORMAL
Lab: NORMAL

## 2025-04-17 PROBLEM — Z97.5 NEXPLANON IN PLACE: Status: RESOLVED | Noted: 2025-01-13 | Resolved: 2025-04-17

## 2025-04-17 PROBLEM — Z97.5 IUD (INTRAUTERINE DEVICE) IN PLACE: Status: ACTIVE | Noted: 2025-04-17

## 2025-04-17 NOTE — PROGRESS NOTES
IUD Insertion    Patient's last menstrual period was 03/19/2025 (approximate).    Date of procedure:  04/16/2025    Risks and benefits discussed? yes  All questions answered? yes  Consents given by The patient  Written consent obtained? yes    Local anesthesia used:  no    Procedure documentation:    After verifying the patient had a low probability of being pregnant and met the criteria for insertion, a sterile speculum has placed and the cervix was cleansed with an antiseptic solution.  Vaginal discharge was scant.  The anterior lip of the cervix was grasped with a long Allis clamp and the uterine cavity was gently sounded. There was mild difficulty passing the sound through the cervix.  Cervical dilation did not need to be performed prior to placing the IUD.  The uterus was anteverted and sounded to 9 cms.  The Kyleena was then prepared per the manufacturers instructions.    The Kyleena was advanced to a point 2 cms from the fundus and then the arms were released from the sheath.  The device was advanced to the fundus and the device was released fully from the sheath.. The string was cut 3 cms in length.  Bleeding from the cervix was scant.    She tolerated the procedure without any difficulty.     Post procedure instructions: It was reviewed that the Kyleena will not alter the timing of when she bleeds but it may decrease the quantity of flow and cramps.  Roughly 30% of people will be amenorrheic over time.  Efficacy rate of 99.2% over 5 years was discussed.  Spontaneous expulsion rate of 1-2% was also discussed.  If she has any issue with fever or excessive bleeding or pain she is to call the office immediately.  Otherwise I would like to see her back in 5 weeks for f/u appt.    Ludwig Hernandez MD  Obstetrics and Gynecology  [facility]

## 2025-05-02 ENCOUNTER — OFFICE VISIT (OUTPATIENT)
Dept: INTERNAL MEDICINE | Facility: CLINIC | Age: 25
End: 2025-05-02
Payer: COMMERCIAL

## 2025-05-02 VITALS
DIASTOLIC BLOOD PRESSURE: 82 MMHG | WEIGHT: 256 LBS | BODY MASS INDEX: 45.36 KG/M2 | HEART RATE: 97 BPM | OXYGEN SATURATION: 98 % | SYSTOLIC BLOOD PRESSURE: 117 MMHG | TEMPERATURE: 97.7 F | HEIGHT: 63 IN

## 2025-05-02 DIAGNOSIS — Z13.29 SCREENING FOR ENDOCRINE, NUTRITIONAL, METABOLIC AND IMMUNITY DISORDER: ICD-10-CM

## 2025-05-02 DIAGNOSIS — E66.01 CLASS 3 SEVERE OBESITY DUE TO EXCESS CALORIES WITHOUT SERIOUS COMORBIDITY WITH BODY MASS INDEX (BMI) OF 40.0 TO 44.9 IN ADULT: ICD-10-CM

## 2025-05-02 DIAGNOSIS — Z00.00 PHYSICAL EXAM, ANNUAL: Primary | ICD-10-CM

## 2025-05-02 DIAGNOSIS — N62 MACROMASTIA: ICD-10-CM

## 2025-05-02 DIAGNOSIS — Z13.228 SCREENING FOR ENDOCRINE, NUTRITIONAL, METABOLIC AND IMMUNITY DISORDER: ICD-10-CM

## 2025-05-02 DIAGNOSIS — B37.2 YEAST DERMATITIS: ICD-10-CM

## 2025-05-02 DIAGNOSIS — Z13.21 SCREENING FOR ENDOCRINE, NUTRITIONAL, METABOLIC AND IMMUNITY DISORDER: ICD-10-CM

## 2025-05-02 DIAGNOSIS — F41.9 ANXIETY: ICD-10-CM

## 2025-05-02 DIAGNOSIS — Z13.0 SCREENING FOR ENDOCRINE, NUTRITIONAL, METABOLIC AND IMMUNITY DISORDER: ICD-10-CM

## 2025-05-02 DIAGNOSIS — E66.813 CLASS 3 SEVERE OBESITY DUE TO EXCESS CALORIES WITHOUT SERIOUS COMORBIDITY WITH BODY MASS INDEX (BMI) OF 40.0 TO 44.9 IN ADULT: ICD-10-CM

## 2025-05-02 RX ORDER — MUPIROCIN 20 MG/G
1 OINTMENT TOPICAL 3 TIMES DAILY
Qty: 1 EACH | Refills: 0 | Status: SHIPPED | OUTPATIENT
Start: 2025-05-02

## 2025-05-02 RX ORDER — FLUCONAZOLE 150 MG/1
150 TABLET ORAL ONCE
Qty: 1 TABLET | Refills: 0 | Status: SHIPPED | OUTPATIENT
Start: 2025-05-02 | End: 2025-05-02

## 2025-05-02 RX ORDER — HYDROXYZINE HYDROCHLORIDE 25 MG/1
12.5-25 TABLET, FILM COATED ORAL 3 TIMES DAILY PRN
Qty: 90 TABLET | Refills: 1 | Status: SHIPPED | OUTPATIENT
Start: 2025-05-02
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
## 2025-05-08 ENCOUNTER — OFFICE VISIT (OUTPATIENT)
Dept: OBSTETRICS AND GYNECOLOGY | Facility: CLINIC | Age: 25
End: 2025-05-08
Payer: COMMERCIAL

## 2025-05-08 ENCOUNTER — LAB (OUTPATIENT)
Dept: LAB | Facility: HOSPITAL | Age: 25
End: 2025-05-08
Payer: COMMERCIAL

## 2025-05-08 ENCOUNTER — HOSPITAL ENCOUNTER (EMERGENCY)
Facility: HOSPITAL | Age: 25
Discharge: HOME | End: 2025-05-08
Attending: EMERGENCY MEDICINE
Payer: COMMERCIAL

## 2025-05-08 ENCOUNTER — RESULTS FOLLOW-UP (OUTPATIENT)
Dept: INTERNAL MEDICINE | Facility: CLINIC | Age: 25
End: 2025-05-08
Payer: COMMERCIAL

## 2025-05-08 VITALS
WEIGHT: 237 LBS | SYSTOLIC BLOOD PRESSURE: 126 MMHG | BODY MASS INDEX: 41.99 KG/M2 | DIASTOLIC BLOOD PRESSURE: 90 MMHG | TEMPERATURE: 98.1 F | HEIGHT: 63 IN | HEART RATE: 96 BPM | OXYGEN SATURATION: 97 % | RESPIRATION RATE: 16 BRPM

## 2025-05-08 VITALS
HEIGHT: 63 IN | SYSTOLIC BLOOD PRESSURE: 126 MMHG | DIASTOLIC BLOOD PRESSURE: 80 MMHG | WEIGHT: 237 LBS | BODY MASS INDEX: 41.99 KG/M2

## 2025-05-08 DIAGNOSIS — L50.9 URTICARIAL RASH: Primary | ICD-10-CM

## 2025-05-08 DIAGNOSIS — N89.8 VAGINAL IRRITATION: ICD-10-CM

## 2025-05-08 DIAGNOSIS — N94.89 LABIAL SWELLING: Primary | ICD-10-CM

## 2025-05-08 DIAGNOSIS — N94.89 LABIAL SWELLING: ICD-10-CM

## 2025-05-08 DIAGNOSIS — Z98.891 PREVIOUS CESAREAN SECTION: ICD-10-CM

## 2025-05-08 LAB
ALBUMIN SERPL-MCNC: 4.7 G/DL (ref 3.5–5.2)
ALBUMIN/GLOB SERPL: 1.7 G/DL
ALP SERPL-CCNC: 100 U/L (ref 39–117)
ALT SERPL W P-5'-P-CCNC: 23 U/L (ref 1–33)
ANION GAP SERPL CALCULATED.3IONS-SCNC: 15.8 MMOL/L (ref 5–15)
AST SERPL-CCNC: 24 U/L (ref 1–32)
BILIRUB SERPL-MCNC: 0.9 MG/DL (ref 0–1.2)
BUN SERPL-MCNC: 10 MG/DL (ref 6–20)
BUN/CREAT SERPL: 12.2 (ref 7–25)
CALCIUM SPEC-SCNC: 10.2 MG/DL (ref 8.6–10.5)
CHLORIDE SERPL-SCNC: 106 MMOL/L (ref 98–107)
CO2 SERPL-SCNC: 18.2 MMOL/L (ref 22–29)
CREAT SERPL-MCNC: 0.82 MG/DL (ref 0.57–1)
DEPRECATED RDW RBC AUTO: 39 FL (ref 37–54)
EGFRCR SERPLBLD CKD-EPI 2021: 103.9 ML/MIN/1.73
ERYTHROCYTE [DISTWIDTH] IN BLOOD BY AUTOMATED COUNT: 13.2 % (ref 12.3–15.4)
GLOBULIN UR ELPH-MCNC: 2.8 GM/DL
GLUCOSE SERPL-MCNC: 151 MG/DL (ref 65–99)
HCT VFR BLD AUTO: 41.6 % (ref 34–46.6)
HGB BLD-MCNC: 14.3 G/DL (ref 12–15.9)
MCH RBC QN AUTO: 28 PG (ref 26.6–33)
MCHC RBC AUTO-ENTMCNC: 34.4 G/DL (ref 31.5–35.7)
MCV RBC AUTO: 81.6 FL (ref 79–97)
PLATELET # BLD AUTO: 250 10*3/MM3 (ref 140–450)
PMV BLD AUTO: 10.9 FL (ref 6–12)
POTASSIUM SERPL-SCNC: 4.3 MMOL/L (ref 3.5–5.2)
PROT SERPL-MCNC: 7.5 G/DL (ref 6–8.5)
RBC # BLD AUTO: 5.1 10*6/MM3 (ref 3.77–5.28)
SODIUM SERPL-SCNC: 140 MMOL/L (ref 136–145)
WBC NRBC COR # BLD AUTO: 9.16 10*3/MM3 (ref 3.4–10.8)

## 2025-05-08 PROCEDURE — 36415 COLL VENOUS BLD VENIPUNCTURE: CPT | Performed by: NURSE PRACTITIONER

## 2025-05-08 PROCEDURE — 80053 COMPREHEN METABOLIC PANEL: CPT | Performed by: NURSE PRACTITIONER

## 2025-05-08 PROCEDURE — 85027 COMPLETE CBC AUTOMATED: CPT | Performed by: NURSE PRACTITIONER

## 2025-05-08 PROCEDURE — 1159F MED LIST DOCD IN RCRD: CPT | Performed by: MIDWIFE

## 2025-05-08 PROCEDURE — 1160F RVW MEDS BY RX/DR IN RCRD: CPT | Performed by: MIDWIFE

## 2025-05-08 PROCEDURE — 36415 COLL VENOUS BLD VENIPUNCTURE: CPT

## 2025-05-08 PROCEDURE — 63710000001 DIPHENHYDRAMINE PER 50 MG: Performed by: EMERGENCY MEDICINE

## 2025-05-08 PROCEDURE — 99283 EMERGENCY DEPT VISIT LOW MDM: CPT | Performed by: EMERGENCY MEDICINE

## 2025-05-08 PROCEDURE — 99213 OFFICE O/P EST LOW 20 MIN: CPT | Performed by: MIDWIFE

## 2025-05-08 RX ORDER — DIPHENHYDRAMINE HCL 25 MG
25 CAPSULE ORAL EVERY 6 HOURS PRN
Qty: 12 CAPSULE | Refills: 0 | Status: SHIPPED | OUTPATIENT
Start: 2025-05-08

## 2025-05-08 RX ORDER — DIPHENHYDRAMINE HCL 25 MG
25 CAPSULE ORAL ONCE
Status: COMPLETED | OUTPATIENT
Start: 2025-05-08 | End: 2025-05-08

## 2025-05-08 RX ADMIN — DIPHENHYDRAMINE HYDROCHLORIDE 25 MG: 25 CAPSULE ORAL at 07:35

## 2025-05-08 RX ADMIN — DEXAMETHASONE 10 MG: 2 TABLET ORAL at 07:35

## 2025-05-08 NOTE — PROGRESS NOTES
"Chief Complaint   Patient presents with    labial swelling     ER follow up- labial rash/swelling       Carol Vega is a 24 y.o. year old  presenting to be seen for left labial swelling. She also states it was itching. She noticed it this am and went to ED @ 0630. She was given Decadron and Benadryl and advised to followup here. She states it has improved.  She denies recent intercourse, no new underwear, soaps, or detergents.  She hasn't noticed any vaginal discharge but had a Mirena placed on  and has been having heavy bleeding since.   She was recently treated with Diflucan for yeast under her breasts and in groin.      History  Past Medical History:   Diagnosis Date    Anxiety     Endometriosis     Hx of Chlamydia     with last Pregnancy    Hx of Gonorrhea     age 18    Hx of Kidney stone     Hx of Seizures     trauma induced years ago/ last one was 2-3 years ago - no medications    Major depression     Migraine     PMS (premenstrual syndrome)     PTSD (post-traumatic stress disorder)     Self-injurious behavior     hx of cutting starting at age 15    Suicide attempt     3/25/19-overdose attempt, 2017 overdose attempt    Urinary tract infection    , Allergies:  Iodine and Latex    Social History    Tobacco Use      Smoking status: Never        Passive exposure: Never      Smokeless tobacco: Never      Tobacco comments: vapes      Review of Systems  Pertinent items are noted in HPI, all other systems reviewed and negative       Objective   /80   Ht 160 cm (63\")   Wt 108 kg (237 lb)   LMP 2025   BMI 41.98 kg/m²     Physical Exam: Chaperone present  General Appearance: alert, appears stated age, and cooperative  Lungs: respirations regular, respirations even, and respirations unlabored  Pelvic: Clinical staff was present for exam  External genitalia:  Left labia is swollen, no redness, non tender to touch  Vaginal:  normal pink mucosa without prolapse or lesions. blood present -  freely " flowing and dark red;  Extremities: moves extremities well, no edema, no cyanosis, and no redness  Skin: no bleeding, bruising or rash and no lesions noted  Neurologic: Mental Status orientated to person, place, time and situation, Speech normal content and execusion    Lab Review   CBC and CMP    Imaging   No data reviewed         Assessment /Plan    Diagnoses and all orders for this visit:    1. Labial swelling (Primary)  Warm soaks TID  2. Vaginal irritation  -     NuSwab VG+ - Swab, Cervix    Also discussed irregular bleeding/spotting for up to 6 months with IUD.        No orders of the defined types were placed in this encounter.    Follow up PRN           This note was electronically signed.  Heather Joyner CNM  5/8/2025

## 2025-05-08 NOTE — DISCHARGE INSTRUCTIONS
If you notice any concerning symptoms, please return to the ER immediately. These can include but are not limited to: worsening of you condition, fevers, chills, shortness of breath, vomiting, weakness of the extremities, changes in your mental status, numbness, pale extremities, or chest pain.     You may take additional Benadryl to help with your rash and itching.  I do feel this more along the lines of an allergic reaction.  Do not take Benadryl with your Atarax.    Take medications as prescribed, your pharmacist may have additional recommendations concerning these medications.    For pain use ibuprofen (Motrin) or acetaminophen (Tylenol), unless prescribed medications that also contain these medications.  You can take over the counter acetaminophen or ibuprofen, please follow the directions as dosages differ. Do not take ibuprofen if you have a history of peptic ulcers, kidney disease, bariatric surgery, or are currently pregnant.  Do not take Tylenol if you have a history of liver disease or alcohol use disorder.        THANK YOU!!! From [facility] Emergency Department    On behalf of the Emergency Department staff at [facility], I would like to thank you for giving us the opportunity to address your health care needs and concerns. We hope that during your visit, our service was delivered in a professional and caring manner. Please keep [facility] in mind as we walk with you down the path to your own personal wellness. Please expect follow-up phone calls concerning additional care and questions about your experience.      You have received additional information specific to your diagnosis in these discharge instructions, please read these fully.  Anytime you have been seen in the emergency department we recommend close follow up with your primary care provider or specialist, please follow these directions as indicated.

## 2025-05-08 NOTE — ED PROVIDER NOTES
[facility] EMERGENCY DEPARTMENT  Emergency Department Encounter  Emergency Medicine Physician Note     Pt Name:Carol Vega  MRN: [id number]  Birthdate 2000  Date of evaluation: 2025  PCP:  Hafsa Woo APRN  Note Started: 7:08 AM EDT      CHIEF COMPLAINT       Chief Complaint   Patient presents with    Groin Pain     Pt reports having left sided abnormal swelling and burning to groin. Pt reports having IUD placed on        HISTORY OF PRESENT ILLNESS  (Location/Symptom, Timing/Onset, Context/Setting, Quality, Duration, Modifying Factors, Severity.)      Carol Vega is a 24 y.o. female who presents with left-sided swelling to the labia majora patient noted this morning.  Patient recently had IUD placed about 3 weeks ago.  Patient denies any sexual intercourse for the last couple days.  Patient denies any product application that could cause an allergic reaction.  Patient states that she has been using tampons is having vaginal bleeding, states that she has been changing regularly and using the same tampons.    PAST MEDICAL / SURGICAL / SOCIAL / FAMILY HISTORY     Past Medical History:   Diagnosis Date    Anxiety     Endometriosis     Hx of Chlamydia     with last Pregnancy    Hx of Gonorrhea     age 18    Hx of Kidney stone     Hx of Seizures     trauma induced years ago/ last one was 2-3 years ago - no medications    Major depression     Migraine     PMS (premenstrual syndrome)     PTSD (post-traumatic stress disorder)     Self-injurious behavior     hx of cutting starting at age 15    Suicide attempt     3/25/19-overdose attempt, 2017 overdose attempt    Urinary tract infection      No additional pertinent       Past Surgical History:   Procedure Laterality Date     SECTION N/A 2023    Procedure:  SECTION PRIMARY;  Surgeon: Ludwig Hernandez MD;  Location: [facility];  Service: Obstetrics/Gynecology;  Laterality: N/A;     SECTION N/A  10/11/2024    Procedure:  SECTION REPEAT;  Surgeon: Ludwig Hernandez MD;  Location: [facility];  Service: Obstetrics/Gynecology;  Laterality: N/A;    CHOLECYSTECTOMY N/A 10/16/2023    Procedure: CHOLECYSTECTOMY LAPAROSCOPIC;  Surgeon: Noemy Tsang MD;  Location: [facility] OR;  Service: General;  Laterality: N/A;    ESOPHAGEAL DILATATION      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    TONSILLECTOMY       No additional pertinent       Social History     Socioeconomic History    Marital status: Single   Tobacco Use    Smoking status: Never     Passive exposure: Never    Smokeless tobacco: Never    Tobacco comments:     vapes   Vaping Use    Vaping status: Every Day    Substances: Nicotine, Flavoring    Devices: Disposable   Substance and Sexual Activity    Alcohol use: Never    Drug use: Not Currently     Types: Marijuana     Comment: pt reports marjuana x 1 during pregnancy    Sexual activity: Yes     Partners: Male     Birth control/protection: None       Family History   Problem Relation Age of Onset    Rheum arthritis Mother     Depression Mother     Drug abuse Mother     Heart murmur Father     Drug abuse Father     Alcohol abuse Father        Allergies:  Iodine and Latex    Home Medications:  Prior to Admission medications    Medication Sig Start Date End Date Taking? Authorizing Provider   Desvenlafaxine Succinate ER (Pristiq) 25 MG tablet sustained-release 24 hour Take 1 tablet by mouth Daily. 3/21/25   Jeana Penny PA-C   hydrOXYzine (ATARAX) 25 MG tablet Take 0.5-1 tablets by mouth 3 (Three) Times a Day As Needed for Anxiety or Itching. 25   Hafsa Woo APRN   levonorgestrel (KYLEENA) 19.5 MG intrauterine device IUD To be inserted one time by prescriber. Route intrauterine. 25  Ludwig Hernandez MD   mupirocin (BACTROBAN) 2 % ointment Apply 1 Application topically to the appropriate area as directed 3 (Three) Times a Day. 25   Hafsa Woo  "APRN         REVIEW OF SYSTEMS       Review of Systems   Genitourinary:  Positive for pelvic pain and vaginal pain. Negative for genital sores (Swelling).   Skin:  Positive for rash.       PHYSICAL EXAM      INITIAL VITALS:   /90   Pulse 96   Temp 98.1 °F (36.7 °C) (Oral)   Resp 16   Ht 160 cm (63\")   Wt 108 kg (237 lb)   LMP 04/09/2025   SpO2 97%   BMI 41.98 kg/m²     Physical Exam  Exam conducted with a chaperone present.   Genitourinary:     Labia:         Left: Tenderness (And swelling noted unilaterally to the left side) present.       Vagina: Bleeding present.      Comments: Nurse chaperone in the room  Skin:     General: Skin is warm.      Findings: Rash (Urticarial rash of the inguinal fold) present.   Neurological:      General: No focal deficit present.      Mental Status: She is oriented to person, place, and time.   Psychiatric:         Mood and Affect: Mood normal.         Behavior: Behavior normal.           DDX/DIAGNOSTIC RESULTS / EMERGENCY DEPARTMENT COURSE / MDM     Differential Diagnosis included but not limited: Allergic reaction    Diagnoses Considered but Do Not Suspect: Low concern for abscess, Bartholin cyst or other infectious process, as there is no erythema and no signs of inflammation to the left labia majora, does appear to be swollen and patient does have urticarial rash in inguinal folds, do feel this is more than exposure to allergen    Decision Rules/Scores utilized: N/A     Tests considered but not ordered and why:  N/A     MIPS: N/A     Code Status Discussion:  Not Discussed    Additional Patient Education Provided: None     Medical Decision Making    Medical Decision Making  Patient presenting with left labial enlargement, initial concerns for abscess or infectious process, there was no erythema or no signs of infection.  Patient initially had no rash, denied any complaints of exposure to any new items.  Patient does describe some active vaginal bleeding since " getting a IUD 3 weeks ago.  Do not feel this is secondary to IUD.  Patient has started having an urticarial rash while here in the emerged clinic, I do feel that this is more of an allergic reaction.  Patient was given Decadron and Benadryl with some improvement.  Patient instructed to follow-up with OB/GYN outpatient which she already has an appointment.  Patient discharged home in stable condition for further follow-up with OB/GYN.  Patient instructed to take Benadryl to help with any allergic reaction.    Problems Addressed:  Labial swelling: acute illness or injury  Urticarial rash: acute illness or injury        See ED COURSE for additional MDM statements    EKG  None Performed     All EKG's are interpreted by the Emergency Department Physician who either signs or Co-signs this chart in the absence of a cardiologist.    Additional Scores                   EMERGENCY DEPARTMENT COURSE:    ED Course as of 05/09/25 0924   Thu May 08, 2025   0858 Patient reevaluated, her rash is improvement at this time.  Patient still describes large lobular swelling.  Patient already has an appointment with her OB/GYN outpatient today.  Plan to discharge patient with close follow-up with the OB/GYN group. [CR]      ED Course User Index  [CR] Bob Hanna,        PROCEDURES:  None Performed   Procedures    DATA FOR LAB AND RADIOLOGY TESTS ORDERED BELOW ARE REVIEWED BY THE ED CLINICIAN:    RADIOLOGY: All x-rays, CT, MRI, and formal ultrasound images (except ED bedside ultrasound) are read by the radiologist, see reports below, unless otherwise noted in MDM or here.  Reports below are reviewed by myself.  No orders to display       LABS: Lab orders shown below, the results are reviewed by myself, and all abnormals are listed below.  Labs Reviewed - No data to display    Vitals Reviewed:    Vitals:    05/08/25 0701 05/08/25 0715 05/08/25 0720   BP: 134/98 140/85 126/90   BP Location: Left arm     Patient Position:  "Sitting     Pulse: 96     Resp: 16     Temp: 98.1 °F (36.7 °C)     TempSrc: Oral     SpO2: 97% 97% 97%   Weight: 108 kg (237 lb)     Height: 160 cm (63\")         MEDICATIONS GIVEN TO PATIENT THIS ENCOUNTER:  Medications   diphenhydrAMINE (BENADRYL) capsule 25 mg (25 mg Oral Given 5/8/25 0735)   dexAMETHasone (DECADRON) tablet 10 mg (10 mg Oral Given 5/8/25 0735)       CONSULTS:  None    CRITICAL CARE:  There was significant risk of life threatening deterioration of patient's condition requiring my direct management. Critical care time 0 minutes, excluding any documented procedures.    FINAL IMPRESSION      1. Urticarial rash    2. Labial swelling          DISPOSITION / PLAN     ED Disposition       ED Disposition   Discharge    Condition   Stable    Comment   --               PATIENT REFERRED TO:  Hafsa Woo, APRN  [address]  [facility] [place] [facility] [place] [address] [phone]    Schedule an appointment as soon as possible for a visit       Ludwig Hernandez MD  [address]  [place] [id number] [phone]    Go today        DISCHARGE MEDICATIONS:     Medication List        START taking these medications      diphenhydrAMINE 25 mg capsule  Commonly known as: BENADRYL  Take 1 capsule by mouth Every 6 (Six) Hours As Needed for Itching.            CONTINUE taking these medications      Desvenlafaxine Succinate ER 25 MG tablet sustained-release 24 hour  Commonly known as: Pristiq  Take 1 tablet by mouth Daily.     hydrOXYzine 25 MG tablet  Commonly known as: ATARAX  Take 0.5-1 tablets by mouth 3 (Three) Times a Day As Needed for Anxiety or Itching.     Kyleena 19.5 MG intrauterine device IUD  Generic drug: levonorgestrel  To be inserted one time by prescriber. Route intrauterine.     mupirocin 2 % ointment  Commonly known as: BACTROBAN  Apply 1 Application topically to the appropriate area as directed 3 (Three) Times a Day.               Where to Get Your Medications        These medications were " sent to [id number] - [place] - [address] AT [facility] & [place] [address] - [phone] PH - [phone] FX  [address] [place] [address]      Phone: [phone]   diphenhydrAMINE 25 mg capsule         Electronically signed by Bob Hanna DO, 05/08/25, 7:08 AM EDT.    Emergency Medicine Physician  [facility]  (Please note that portions of thisnote were completed with a voice recognition program.  Efforts were made to edit the dictations but occasionally words are mis-transcribed.)         Bob Hanna DO  05/09/25 0691

## 2025-05-09 LAB
A VAGINAE DNA VAG QL NAA+PROBE: NORMAL SCORE
BASOPHILS # BLD AUTO: 0.04 10*3/MM3 (ref 0–0.2)
BASOPHILS NFR BLD AUTO: 0.2 % (ref 0–1.5)
BVAB2 DNA VAG QL NAA+PROBE: NORMAL SCORE
C ALBICANS DNA VAG QL NAA+PROBE: NEGATIVE
C GLABRATA DNA VAG QL NAA+PROBE: NEGATIVE
C TRACH DNA SPEC QL NAA+PROBE: NEGATIVE
CHOLEST SERPL-MCNC: 146 MG/DL (ref 0–200)
DEPRECATED RDW RBC AUTO: 40 FL (ref 37–54)
EOSINOPHIL # BLD AUTO: 0.02 10*3/MM3 (ref 0–0.4)
EOSINOPHIL NFR BLD AUTO: 0.1 % (ref 0.3–6.2)
ERYTHROCYTE [DISTWIDTH] IN BLOOD BY AUTOMATED COUNT: 13.5 % (ref 12.3–15.4)
HCT VFR BLD AUTO: 40.2 % (ref 34–46.6)
HDLC SERPL-MCNC: 38 MG/DL (ref 40–60)
HGB BLD-MCNC: 14.1 G/DL (ref 12–15.9)
IMM GRANULOCYTES # BLD AUTO: 0.11 10*3/MM3 (ref 0–0.05)
IMM GRANULOCYTES NFR BLD AUTO: 0.6 % (ref 0–0.5)
LDLC SERPL CALC-MCNC: 71 MG/DL (ref 0–100)
LDLC/HDLC SERPL: 1.68 {RATIO}
LYMPHOCYTES # BLD AUTO: 2.25 10*3/MM3 (ref 0.7–3.1)
LYMPHOCYTES NFR BLD AUTO: 12.5 % (ref 19.6–45.3)
MCH RBC QN AUTO: 28.8 PG (ref 26.6–33)
MCHC RBC AUTO-ENTMCNC: 35.1 G/DL (ref 31.5–35.7)
MCV RBC AUTO: 82.2 FL (ref 79–97)
MEGA1 DNA VAG QL NAA+PROBE: NORMAL SCORE
MONOCYTES # BLD AUTO: 1.29 10*3/MM3 (ref 0.1–0.9)
MONOCYTES NFR BLD AUTO: 7.2 % (ref 5–12)
N GONORRHOEA DNA VAG QL NAA+PROBE: NEGATIVE
NEUTROPHILS NFR BLD AUTO: 14.3 10*3/MM3 (ref 1.7–7)
NEUTROPHILS NFR BLD AUTO: 79.4 % (ref 42.7–76)
NRBC BLD AUTO-RTO: 0 /100 WBC (ref 0–0.2)
PLATELET # BLD AUTO: 286 10*3/MM3 (ref 140–450)
PMV BLD AUTO: 10.9 FL (ref 6–12)
RBC # BLD AUTO: 4.89 10*6/MM3 (ref 3.77–5.28)
T VAGINALIS DNA VAG QL NAA+PROBE: NEGATIVE
TRIGL SERPL-MCNC: 221 MG/DL (ref 0–150)
VLDLC SERPL-MCNC: 37 MG/DL (ref 5–40)
WBC NRBC COR # BLD AUTO: 18.01 10*3/MM3 (ref 3.4–10.8)

## 2025-05-09 PROCEDURE — 83527 ASSAY OF INSULIN: CPT | Performed by: NURSE PRACTITIONER

## 2025-05-09 PROCEDURE — 84403 ASSAY OF TOTAL TESTOSTERONE: CPT | Performed by: NURSE PRACTITIONER

## 2025-05-09 PROCEDURE — 84402 ASSAY OF FREE TESTOSTERONE: CPT | Performed by: NURSE PRACTITIONER

## 2025-05-09 PROCEDURE — 80061 LIPID PANEL: CPT | Performed by: NURSE PRACTITIONER

## 2025-05-09 PROCEDURE — 85025 COMPLETE CBC W/AUTO DIFF WBC: CPT

## 2025-05-09 PROCEDURE — 83525 ASSAY OF INSULIN: CPT | Performed by: NURSE PRACTITIONER

## 2025-05-12 RX ORDER — ERGOCALCIFEROL 1.25 MG/1
50000 CAPSULE, LIQUID FILLED ORAL WEEKLY
Qty: 12 CAPSULE | Refills: 0 | Status: SHIPPED | OUTPATIENT
Start: 2025-05-12

## 2025-05-14 LAB
TESTOST FREE SERPL-MCNC: <0.2 PG/ML (ref 0–4.2)
TESTOST SERPL-MCNC: 7.4 NG/DL (ref 10–55)

## 2025-05-22 LAB
INSULIN FREE SERPL-ACNC: 37 UU/ML
INSULIN SERPL-ACNC: 37 UU/ML

## 2025-05-23 ENCOUNTER — OFFICE VISIT (OUTPATIENT)
Dept: OBSTETRICS AND GYNECOLOGY | Facility: CLINIC | Age: 25
End: 2025-05-23
Payer: COMMERCIAL

## 2025-05-23 VITALS
HEIGHT: 63 IN | DIASTOLIC BLOOD PRESSURE: 68 MMHG | SYSTOLIC BLOOD PRESSURE: 112 MMHG | WEIGHT: 233.6 LBS | BODY MASS INDEX: 41.39 KG/M2

## 2025-05-23 DIAGNOSIS — B37.2 YEAST DERMATITIS: ICD-10-CM

## 2025-05-23 DIAGNOSIS — Z97.5 IUD (INTRAUTERINE DEVICE) IN PLACE: Primary | ICD-10-CM

## 2025-05-23 DIAGNOSIS — N62 MACROMASTIA: Primary | ICD-10-CM

## 2025-06-04 RX ORDER — IBUPROFEN 800 MG/1
800 TABLET, FILM COATED ORAL EVERY 6 HOURS PRN
Qty: 90 TABLET | Refills: 1 | Status: SHIPPED | OUTPATIENT
Start: 2025-06-04

## 2025-06-05 NOTE — PROGRESS NOTES
"IUD String Check    Chief Complaint   Patient presents with    Follow-up     IUD check, no issues/concerns       24 y.o.  female who presents for an IUD string check.    She has no complaints today. She has minimal bleeding. She has minimal pain symptoms.    Vitals:    25 1424   BP: 112/68   Weight: 106 kg (233 lb 9.6 oz)   Height: 160 cm (62.99\")       PHYSICAL EXAM   General appearance: well nourished, well hydrated, no acute distress  Cervix: Normal appearance, IUD strings present    IMPRESSION/PLAN:    IUD in appropriate position    RTC as needed or for annual visits    Coding/billing based on time: 10 total minutes were required for this encounter.    Ludwig Hernandez MD  Obstetrics and Gynecology  [facility]   "

## 2025-06-20 DIAGNOSIS — F41.9 ANXIETY: ICD-10-CM

## 2025-06-20 RX ORDER — DESVENLAFAXINE 25 MG/1
25 TABLET, EXTENDED RELEASE ORAL DAILY
Qty: 90 TABLET | Refills: 0 | Status: SHIPPED | OUTPATIENT
Start: 2025-06-20

## 2025-07-09 ENCOUNTER — PATIENT ROUNDING (BHMG ONLY) (OUTPATIENT)
Dept: URGENT CARE | Facility: CLINIC | Age: 25
End: 2025-07-09
Payer: COMMERCIAL

## 2025-08-20 ENCOUNTER — OFFICE VISIT (OUTPATIENT)
Dept: INTERNAL MEDICINE | Facility: CLINIC | Age: 25
End: 2025-08-20
Payer: COMMERCIAL

## 2025-08-20 VITALS
DIASTOLIC BLOOD PRESSURE: 80 MMHG | HEART RATE: 84 BPM | WEIGHT: 227 LBS | RESPIRATION RATE: 20 BRPM | SYSTOLIC BLOOD PRESSURE: 122 MMHG | TEMPERATURE: 97.2 F | BODY MASS INDEX: 40.22 KG/M2 | OXYGEN SATURATION: 98 % | HEIGHT: 63 IN

## 2025-08-20 DIAGNOSIS — J02.9 SORE THROAT: ICD-10-CM

## 2025-08-20 DIAGNOSIS — R05.1 ACUTE COUGH: ICD-10-CM

## 2025-08-20 DIAGNOSIS — J40 BRONCHITIS: Primary | ICD-10-CM

## 2025-08-20 LAB
EXPIRATION DATE: NORMAL
EXPIRATION DATE: NORMAL
FLUAV AG UPPER RESP QL IA.RAPID: NOT DETECTED
FLUBV AG UPPER RESP QL IA.RAPID: NOT DETECTED
INTERNAL CONTROL: NORMAL
INTERNAL CONTROL: NORMAL
Lab: NORMAL
Lab: NORMAL
S PYO AG THROAT QL: NEGATIVE
SARS-COV-2 AG UPPER RESP QL IA.RAPID: NOT DETECTED

## 2025-08-20 PROCEDURE — 87880 STREP A ASSAY W/OPTIC: CPT | Performed by: STUDENT IN AN ORGANIZED HEALTH CARE EDUCATION/TRAINING PROGRAM

## 2025-08-20 PROCEDURE — 99213 OFFICE O/P EST LOW 20 MIN: CPT | Performed by: STUDENT IN AN ORGANIZED HEALTH CARE EDUCATION/TRAINING PROGRAM

## 2025-08-20 RX ORDER — DEXTROMETHORPHAN HYDROBROMIDE AND PROMETHAZINE HYDROCHLORIDE 15; 6.25 MG/5ML; MG/5ML
5 SYRUP ORAL 4 TIMES DAILY PRN
Qty: 240 ML | Refills: 0 | Status: SHIPPED | OUTPATIENT
Start: 2025-08-20 | End: 2025-08-21

## 2025-08-20 RX ORDER — METHYLPREDNISOLONE 4 MG/1
TABLET ORAL
Qty: 21 TABLET | Refills: 0 | Status: SHIPPED | OUTPATIENT
Start: 2025-08-20 | End: 2025-08-21

## 2025-08-21 ENCOUNTER — OFFICE VISIT (OUTPATIENT)
Dept: PSYCHIATRY | Facility: CLINIC | Age: 25
End: 2025-08-21
Payer: COMMERCIAL

## 2025-08-21 VITALS
HEIGHT: 63 IN | OXYGEN SATURATION: 99 % | BODY MASS INDEX: 40.22 KG/M2 | HEART RATE: 78 BPM | WEIGHT: 227 LBS | SYSTOLIC BLOOD PRESSURE: 102 MMHG | DIASTOLIC BLOOD PRESSURE: 68 MMHG

## 2025-08-21 DIAGNOSIS — F41.3 OTHER MIXED ANXIETY DISORDERS: ICD-10-CM

## 2025-08-21 DIAGNOSIS — R41.840 ATTENTION AND CONCENTRATION DEFICIT: Primary | ICD-10-CM

## 2025-08-21 DIAGNOSIS — F60.3 BORDERLINE PERSONALITY DISORDER: ICD-10-CM

## 2025-08-21 DIAGNOSIS — F44.5 FUNCTIONAL NEUROLOGICAL SYMPTOM DISORDER WITH ATTACKS OR SEIZURES: ICD-10-CM

## 2025-08-21 DIAGNOSIS — Z71.6 ENCOUNTER FOR SMOKING CESSATION COUNSELING: ICD-10-CM

## 2025-08-21 RX ORDER — BUPROPION HYDROCHLORIDE 150 MG/1
150 TABLET ORAL EVERY MORNING
Qty: 30 TABLET | Refills: 1 | Status: SHIPPED | OUTPATIENT
Start: 2025-08-21 | End: 2026-08-21

## (undated) DEVICE — BNDG ADHS PLSTC 1X3IN LF

## (undated) DEVICE — TBG PENCL TELESCP MEGADYNE SMOKE EVAC 10FT

## (undated) DEVICE — STRIP,CLOSURE,WOUND,MEDI-STRIP,1/2X4: Brand: MEDLINE

## (undated) DEVICE — RICH C-SECTION: Brand: MEDLINE INDUSTRIES, INC.

## (undated) DEVICE — LARGE, DISPOSABLE ALEXIS O C-SECTION PROTECTOR - RETRACTOR: Brand: ALEXIS ® O C-SECTION PROTECTOR - RETRACTOR

## (undated) DEVICE — GLV SURG SENSICARE GREEN W/ALOE PF LF 6 STRL

## (undated) DEVICE — ENDOPOUCH RETRIEVER SPECIMEN RETRIEVAL BAGS: Brand: ENDOPOUCH RETRIEVER

## (undated) DEVICE — MONOPOLAR METZENBAUM SCISSOR, MINI BLADE TIP, DISPOSABLE: Brand: MONOPOLAR METZENBAUM SCISSOR, MINI BLADE TIP, DISPOSABLE

## (undated) DEVICE — SOL IRR NACL 0.9PCT BT 1000ML

## (undated) DEVICE — ENDOPATH XCEL UNIVERSAL TROCAR STABLILITY SLEEVES: Brand: ENDOPATH XCEL

## (undated) DEVICE — GLV SURG SENSICARE PI LF PF 7.5 GRN STRL

## (undated) DEVICE — NDL HYPO ECLPS SFTY 22G 1 1/2IN

## (undated) DEVICE — STPLR SKIN SUBCUTICULAR INSORB 2030

## (undated) DEVICE — SUT PDS 0 CT 36IN DYED Z358T

## (undated) DEVICE — SUTURE GUT CHROMIC 3/0 912H

## (undated) DEVICE — DISPOSABLE MONOPOLAR ENDOSCOPIC CORD 10 FT. (3M): Brand: KIRWAN

## (undated) DEVICE — GLV SURG BIOGEL PI ULTRATOUCH G SZ7.5 LF

## (undated) DEVICE — ADHS LIQ MASTISOL 2/3ML

## (undated) DEVICE — 2, DISPOSABLE SUCTION/IRRIGATOR WITHOUT DISPOSABLE TIP: Brand: STRYKEFLOW

## (undated) DEVICE — DRSNG WND BORDR/ADHS NONADHR/GZ LF 4X10IN STRL

## (undated) DEVICE — SLV SCD CALF HEMOFORCE DVT THERP REPROC MD

## (undated) DEVICE — STERILE BABY BLANKET W/CSR: Brand: MEDLINE

## (undated) DEVICE — ENDOPATH XCEL BLADELESS TROCARS WITH STABILITY SLEEVES: Brand: ENDOPATH XCEL

## (undated) DEVICE — DRSNG SURESITE WNDW 4X4.5

## (undated) DEVICE — PENCL SMOKE/EVAC MEGADYNE TELESCP 10FT

## (undated) DEVICE — SUT VIC 0/0 UR6 27IN DYED J603H

## (undated) DEVICE — UNDYED MONOFILAMENT (POLYDIOXANONE), ABSORBABLE SYNTHETIC SURGICAL SUTURE: Brand: PDS

## (undated) DEVICE — TP ELECTRD LAP L WR SPLIT33CM

## (undated) DEVICE — APPL CHLORAPREP W/TINT 26ML ORNG

## (undated) DEVICE — RICH GENERAL LAPAROSCOPY: Brand: MEDLINE INDUSTRIES, INC.

## (undated) DEVICE — ENDOPATH XCEL BLUNT TIP TROCARS WITH SMOOTH SLEEVES: Brand: ENDOPATH XCEL

## (undated) DEVICE — SYR LL TP 10ML STRL

## (undated) DEVICE — GLV SURG ULTRATOUCH BIOGEL/COAT PF LF SZ6 STRL

## (undated) DEVICE — TOWEL,OR,DSP,ST,NATURAL,DLX,4/PK,20PK/CS: Brand: MEDLINE

## (undated) DEVICE — SUT MNCRYL 0 CT 36IN

## (undated) DEVICE — SPNG GZ STRL 2S 4X4 12PLY

## (undated) DEVICE — PATIENT RETURN ELECTRODE, SINGLE-USE, CONTACT QUALITY MONITORING, ADULT, WITH 9FT CORD, FOR PATIENTS WEIGING OVER 33LBS. (15KG): Brand: MEGADYNE

## (undated) DEVICE — SPONGE,LAP,18"X18",DLX,XR,ST,5/PK,40/PK: Brand: MEDLINE

## (undated) DEVICE — GOWN SURG ORBIS LVL3 LG STRL

## (undated) DEVICE — SOL NACL 0.9PCT 1000ML